# Patient Record
Sex: FEMALE | Race: WHITE | NOT HISPANIC OR LATINO | Employment: FULL TIME | URBAN - METROPOLITAN AREA
[De-identification: names, ages, dates, MRNs, and addresses within clinical notes are randomized per-mention and may not be internally consistent; named-entity substitution may affect disease eponyms.]

---

## 2018-09-28 ENCOUNTER — TRANSFERRED RECORDS (OUTPATIENT)
Dept: HEALTH INFORMATION MANAGEMENT | Facility: CLINIC | Age: 23
End: 2018-09-28

## 2019-03-12 ENCOUNTER — PATIENT OUTREACH (OUTPATIENT)
Dept: PLASTIC SURGERY | Facility: CLINIC | Age: 24
End: 2019-03-12

## 2019-03-12 DIAGNOSIS — F64.0 GENDER DYSPHORIA IN ADULT: Primary | ICD-10-CM

## 2019-03-12 NOTE — PROGRESS NOTES
[facility]:  Care Coordination Note     SITUATION   Patient (Kun, She/Her)  is a 24 year old who is receiving support for:  Clinic Care Coordination - Initial (New Pt seeking PCP/Hormones & Vaginoplasty )  .    BACKGROUND     New patient seeking services at [facility]: PCP, Hormones, vaginoplasty.     ASSESSMENT     Surgery              CGC Assessment  Comprehensive Gender Care (CGC) Enrollment: Enrolled(Hormones for 1.5 years)  Patient has a therapist: Yes  Name of therapist: Jerri Castaneda from [place]  Letter of support #1: Requested  Letter of support #2: Requested  Surgery being considered: Yes  Vaginoplasty: Yes          PLAN       Nursing Interventions:   [facility] program and services discussed with patient. CGC referral placed. CGC assessment completed. Process for accessing surgery discussed including: WPATH standards of care, Letters of support, [place] insurance process, surgery scheduling, and approximate timeline. Pt questions answered. Registration completed & reviewed; scheduling process discussed & completed, as necessary. Referrals provided: [facility], phone # provided.    Pt has duplicate record with former legal name (Raj Florian). Since no documentation exists, new record created with current legal name.     More than 50% of the time was used to educate patient on medical & surgical process.     Follow-up plan:  Pt to obtain two letters of support from mental health providers and fax to Dr. Oniel Butler's office. Fax # was provided. Pt to sign up for ev[employer]Snover. Once two letters have been received, pt will be called and scheduled for lower surgery consult.        Larry Schulz

## 2019-03-13 ENCOUNTER — OFFICE VISIT (OUTPATIENT)
Dept: FAMILY MEDICINE | Facility: CLINIC | Age: 24
End: 2019-03-13
Payer: COMMERCIAL

## 2019-03-13 VITALS
OXYGEN SATURATION: 97 % | HEART RATE: 76 BPM | HEIGHT: 67 IN | DIASTOLIC BLOOD PRESSURE: 75 MMHG | BODY MASS INDEX: 27.59 KG/M2 | TEMPERATURE: 98.4 F | RESPIRATION RATE: 20 BRPM | WEIGHT: 175.8 LBS | SYSTOLIC BLOOD PRESSURE: 111 MMHG

## 2019-03-13 DIAGNOSIS — F64.0 GENDER DYSPHORIA IN ADULT: Primary | ICD-10-CM

## 2019-03-13 PROBLEM — G40.822: Status: ACTIVE | Noted: 2019-03-13

## 2019-03-13 LAB
ALBUMIN SERPL-MCNC: 5 MG/DL (ref 3.8–5)
ALP SERPL-CCNC: 27.9 U/L (ref 31.7–110.5)
ALT SERPL-CCNC: 11.8 U/L (ref 0–45)
AST SERPL-CCNC: 11 U/L (ref 0–45)
BILIRUB SERPL-MCNC: 0.4 MG/DL (ref 0.2–1.3)
BUN SERPL-MCNC: 12.8 MG/DL (ref 7–19)
CALCIUM SERPL-MCNC: 9.3 MG/DL (ref 8.5–10.1)
CHLORIDE SERPLBLD-SCNC: 100 MMOL/L (ref 98–110)
CHOLEST SERPL-MCNC: 139.2 MG/DL (ref 0–200)
CHOLEST/HDLC SERPL: 3.5 {RATIO} (ref 0–5)
CO2 SERPL-SCNC: 27.3 MMOL/L (ref 20–32)
CREAT SERPL-MCNC: 0.8 MG/DL (ref 0.5–1)
GFR SERPL CREATININE-BSD FRML MDRD: >90 ML/MIN/1.7 M2
GLUCOSE SERPL-MCNC: 105.5 MG'DL (ref 70–99)
HDLC SERPL-MCNC: 39.3 MG/DL
HEMOGLOBIN: 12.9 G/DL (ref 11.7–15.7)
LDLC SERPL CALC-MCNC: 87 MG/DL (ref 0–129)
POTASSIUM SERPL-SCNC: 3.7 MMOL/DL (ref 3.3–4.5)
PROT SERPL-MCNC: 7.7 G/DL (ref 6.8–8.8)
SODIUM SERPL-SCNC: 135.5 MMOL/L (ref 132.6–141.4)
TRIGL SERPL-MCNC: 65.6 MG/DL (ref 0–150)
VLDL CHOLESTEROL: 13.1 MG/DL (ref 7–32)

## 2019-03-13 RX ORDER — SPIRONOLACTONE 100 MG/1
100 TABLET, FILM COATED ORAL 2 TIMES DAILY
COMMUNITY
End: 2019-06-03

## 2019-03-13 RX ORDER — LACOSAMIDE 100 MG/1
100 TABLET ORAL 2 TIMES DAILY
COMMUNITY
End: 2019-05-31

## 2019-03-13 RX ORDER — ESTRADIOL 2 MG/1
2 TABLET ORAL DAILY
COMMUNITY
End: 2019-05-31

## 2019-03-13 ASSESSMENT — MIFFLIN-ST. JEOR: SCORE: 1584.01

## 2019-03-13 NOTE — PROGRESS NOTES
Transgender History Intake:       IZAIAH Tristan is a 24 year old individual  who presents today for interest in feminizing hormone therapy to better align their body with their gender identity.  Came to this clinic via referral from: ALISTAIR Schulz, [facility] care coordinator    1-How do you identify? BOLD all that apply     Male   Female Transgender  FTM  MTF  Intersex    Genderqueer Gender non-conforming Bigender Don't know Other:     2. What pronouns do you prefer?  She/Her/Hers/Herself   3-What age did you first feel your gender identity (internal sense of gender) did not match your physical body?      3 years ago. Would dress in mom's clothes as a teen   4-Have you ever felt depressed or suicidal because your gender identity and body don't match?    Has had depressed feelings in the past, but saw a therapist which helps. Has never been on medication for depression or anxiety.   5-Have you legally changed your name? yes   If yes: prior name for LIDIA:   Gender marker on ID's?   Yes, has changed on license but not on passport or birth certificate  6-Have you ever seen a health care provider about being [facility]? [facility] [place] [place]  When were you first treated and where? [place], [place]  7-What hormones have you been on and for how long? (These can be treatments you were prescribed, that you got from a friend or bought without a prescription. Include any treatments you currently take.) has been prescribed estradiol and spironolactone from provider in [place], [place]. Comes with records today. Started HRT 1.5 years ago.   8-Ever had any problems with hormone treatment?  No  9-If not on hormones, would you like to be?   N/a, on hormones  10-Have you had any gender affirmation surgery? No  11-Do you want to have surgery now or in future?  YES wants to have bottom surgery  12-Are you out at work or at school or at home?      Everyone knows.  13-What are your other questions or concerns today?  none  14-What else we should know about you?  Has epilepsy for which she takes lacosamide.  Seizures are well controlled with no seizure years. Works at [employer]. Came from [facility] 3 weeks ago. Was living with her mother there.   Has done laser hair removal.     ----------      History reviewed. No pertinent surgical history.    Patient Active Problem List   Diagnosis     Epilepsy     Gender dysphoria in adult     Past Medical History:   Diagnosis Date     Epilepsy (H)      Gender dysphoria        Current Outpatient Medications   Medication Sig Dispense Refill     estradiol (ESTRACE) 2 MG tablet Take 2 mg by mouth daily       Lacosamide (VIMPAT) 100 MG TABS tablet Take 100 mg by mouth 2 times daily       spironolactone (ALDACTONE) 100 MG tablet Take 100 mg by mouth 2 times daily         Family History   Problem Relation Age of Onset     Breast Cancer Mother      Bleeding Disorder Father    Diabetes runs in family but doesn't know who.    Allergies   Allergen Reactions     Codeine Hives     Nickel Rash       History   Smoking Status     Never Smoker   Smokeless Tobacco     Never Used   Social drinking.   Occasional marijuana.     Mental Health Assessment:  Non gender related Therapist? Yes  Chemical use history no  Mental Health diagnosis  history No diagnosis of MDD or anxiety.   Medications prescribed for above and by whom? none  LIDIA sent to:  Patient does not know who her previous providers are other than [facility] [place].            Review of Systems:   CONSTITUTIONAL: NEGATIVE for fever, chills, change in weight  INTEGUMENTARY/SKIN: NEGATIVE for worrisome rashes, moles or lesions  EYES: NEGATIVE for vision changes or irritation  ENT/MOUTH: NEGATIVE for ear, mouth and throat problems  RESP: NEGATIVE for significant cough or SOB  BREAST: NEGATIVE for masses, tenderness or discharge  CV: NEGATIVE for chest pain, palpitations or peripheral edema  GI: NEGATIVE for nausea, abdominal pain, heartburn, or  change in bowel habits  : NEGATIVE for frequency, dysuria, or hematuria  MUSCULOSKELETAL: NEGATIVE for significant arthralgias or myalgia  NEURO: NEGATIVE for weakness, dizziness or paresthesias  ENDOCRINE: NEGATIVE for temperature intolerance, skin/hair changes  HEME/ALLERGY: NEGATIVE for bleeding problems  PSYCHIATRIC: NEGATIVE for changes in mood or affect           Social History     Social History     Socioeconomic History     Marital status: Single     Spouse name: Not on file     Number of children: Not on file     Years of education: Not on file     Highest education level: Not on file   Occupational History     Not on file   Social Needs     Financial resource strain: Not on file     Food insecurity:     Worry: Not on file     Inability: Not on file     Transportation needs:     Medical: Not on file     Non-medical: Not on file   Tobacco Use     Smoking status: Not on file   Substance and Sexual Activity     Alcohol use: Not on file     Drug use: Not on file     Sexual activity: Not on file   Lifestyle     Physical activity:     Days per week: Not on file     Minutes per session: Not on file     Stress: Not on file   Relationships     Social connections:     Talks on phone: Not on file     Gets together: Not on file     Attends Yarsani service: Not on file     Active member of club or organization: Not on file     Attends meetings of clubs or organizations: Not on file     Relationship status: Not on file     Intimate partner violence:     Fear of current or ex partner: Not on file     Emotionally abused: Not on file     Physically abused: Not on file     Forced sexual activity: Not on file   Other Topics Concern     Not on file   Social History Narrative     Not on file       Marital Status: Single  Who lives in your household? Friends (roomates)    Has anyone hurt you physically, for example by pushing, hitting, slapping or kicking you or forcing you to have sex? Denies  Do you feel threatened or  "controlled by a partner, ex-partner or anyone in your life? Denies    Sexual Health     Sexual concerns:  No   History of sexual abuse:  No  STI History:   Neg    Sexual health and relationships:  Current sexual partners: Other pansexual     Past sexual partners:    Other pansexual         Physical Exam:     Vitals:    03/13/19 0822   BP: 111/75   Pulse: 76   Resp: 20   Temp: 98.4  F (36.9  C)   SpO2: 97%   Weight: 79.7 kg (175 lb 12.8 oz)   Height: 1.708 m (5' 7.25\")     BMI= Body mass index is 27.33 kg/m .   Appearance: female appearance and dress  GENERAL:: healthy, alert and no distress   EYES: Eyes grossly normal to inspection  HENT: NC/AT  RESP: lungs clear to auscultation - no rales, no rhonchi, no wheezes  CV: regular rates and rhythm, normal S1 S2, no S3 or S4 and no murmur, no click or rub -  ABDOMEN: soft, no tenderness  NEURO:  mentation intact and speech normal  Psych: Alert and oriented times 3; coherent speech, normal rate and volume, able to articulate logical thoughts, able to abstract reason, no tangential thoughts, no hallucinations or delusions.   Affect: Appropriate/mood-congruent    Assessment and Plan     1. Gender dysphoria in adult  Patient presents to establish care at [facility] for ongoing HRT. Has been on HRT, estradiol 2mg daily and spironolactone 100mg BID, for 1.5 years. Doing well with no concerns. Is happy to date with changes seen. Next step for her is gender affirming surgery. Is working on getting 2 letters from mental health providers. Is working with ALISTAIR Schulz. Will be seeing Dr. Butler for consultation after receiving 2 support letters.   Not needing refill of meds today.   Will obtain HRT labs today.   - Testosterone Total  - Comprehensive Metabolic Panel  - Hemoglobin (HGB)  - Lipid Cascade  .   Follow up:  Follow up in 3 months.    Luz Rodriguez MD  Family Medicine PGY3 Resident                    "

## 2019-03-13 NOTE — PROGRESS NOTES
Preceptor Attestation:   Patient seen, evaluated and discussed with the resident. I have verified the content of the note, which accurately reflects my assessment of the patient and the plan of care.   Supervising Physician:  Stephanie Kramer MD

## 2019-03-14 ENCOUNTER — TELEPHONE (OUTPATIENT)
Dept: FAMILY MEDICINE | Facility: CLINIC | Age: 24
End: 2019-03-14

## 2019-03-14 DIAGNOSIS — F64.0 GENDER DYSPHORIA IN ADULT: Primary | ICD-10-CM

## 2019-03-14 NOTE — TELEPHONE ENCOUNTER
[facility] phone call message - order or referral request for patient:     Order or referral being requested: Referral      Additional Comments: Patient is requesting a mental health referral to see a therapist before having top surgery.     OK to leave a message on voice mail? Yes    Primary language: English      needed? No    Call taken on March 14, 2019 at 12:23 PM by Svetlana Hicks

## 2019-03-15 ENCOUNTER — MEDICAL CORRESPONDENCE (OUTPATIENT)
Dept: HEALTH INFORMATION MANAGEMENT | Facility: CLINIC | Age: 24
End: 2019-03-15

## 2019-03-15 NOTE — TELEPHONE ENCOUNTER
Placed outreach call to patient to discuss mental health referral placed at yesterday's visit. She had some questions about this. She is interested in finding trans-affirmative therapist near home in [place]. I sent her a list via [employer] (see associated encounter). She also asked when she needed to next follow-up with Dr. Rodriguez. Told her that according to plan in note, follow-up was recommended in 3 months but can call back if she needs to be seen sooner. She states understanding.      Mare Swanson, PhD  Behavioral Health Fellow

## 2019-03-15 NOTE — TELEPHONE ENCOUNTER
RN informed patient of referral being placed. They were uncertain of what their options are. RN connected with Dr. Mare Swanson in  and she is willing to connect with patient and provide more information to them. Routing to EARL Swanson.  Brittaney Easley RN

## 2019-03-18 LAB — TESTOST SERPL-MCNC: 10 NG/DL (ref 8–60)

## 2019-04-16 ENCOUNTER — MEDICAL CORRESPONDENCE (OUTPATIENT)
Dept: HEALTH INFORMATION MANAGEMENT | Facility: CLINIC | Age: 24
End: 2019-04-16

## 2019-05-13 ENCOUNTER — TELEPHONE (OUTPATIENT)
Dept: PLASTIC SURGERY | Facility: CLINIC | Age: 24
End: 2019-05-13

## 2019-05-13 NOTE — TELEPHONE ENCOUNTER
[facility]    Phone Message    May a detailed message be left on voicemail: yes    Reason for Call: Other: Patient will be faxing over 2 letters as discussed with Larry.  Will be expecting follow up call.     Action Taken: Message routed to:  [facility]: nikhil solitario

## 2019-05-14 NOTE — TELEPHONE ENCOUNTER
Called pt, provided fax # to have her fax letters of support. Once received, writer will call pt back to schedule consultation with Dr. Butler.

## 2019-05-18 ENCOUNTER — MEDICAL CORRESPONDENCE (OUTPATIENT)
Dept: HEALTH INFORMATION MANAGEMENT | Facility: CLINIC | Age: 24
End: 2019-05-18

## 2019-05-31 ENCOUNTER — OFFICE VISIT (OUTPATIENT)
Dept: FAMILY MEDICINE | Facility: CLINIC | Age: 24
End: 2019-05-31
Payer: COMMERCIAL

## 2019-05-31 VITALS
TEMPERATURE: 96.6 F | HEIGHT: 68 IN | HEART RATE: 61 BPM | DIASTOLIC BLOOD PRESSURE: 69 MMHG | OXYGEN SATURATION: 99 % | WEIGHT: 160 LBS | BODY MASS INDEX: 24.25 KG/M2 | SYSTOLIC BLOOD PRESSURE: 109 MMHG | RESPIRATION RATE: 16 BRPM

## 2019-05-31 DIAGNOSIS — G40.822 NONINTRACTABLE EPILEPTIC SPASMS WITHOUT STATUS EPILEPTICUS (H): ICD-10-CM

## 2019-05-31 DIAGNOSIS — F64.0 GENDER DYSPHORIA IN ADULT: Primary | Chronic | ICD-10-CM

## 2019-05-31 RX ORDER — ESTRADIOL 2 MG/1
4 TABLET ORAL DAILY
Qty: 180 TABLET | Refills: 0 | Status: SHIPPED | OUTPATIENT
Start: 2019-05-31 | End: 2019-08-16

## 2019-05-31 RX ORDER — LACOSAMIDE 100 MG/1
100 TABLET ORAL 2 TIMES DAILY
Qty: 60 TABLET | Refills: 0 | Status: SHIPPED | OUTPATIENT
Start: 2019-05-31 | End: 2019-07-02

## 2019-05-31 ASSESSMENT — MIFFLIN-ST. JEOR: SCORE: 1519.77

## 2019-05-31 ASSESSMENT — PAIN SCALES - GENERAL: PAINLEVEL: NO PAIN (0)

## 2019-05-31 NOTE — PROGRESS NOTES
IZAIAH      Kun is a 24 year old male to female transgender individual that uses pronouns She/Her/Hers/Herself that presents today for follow up of:  feminizing hormone therapy. Gender identity: female    Any special concerns today?      - Kun would like to increase the dose of estrogen, and she has been on this dose (2 mg) for a year and a half, and though she is happy with the results, she would like to have more fat root distribution to the hip area.    -Grand mal seizure.  She finds her antiepileptic medication is expensive, would like us to help her change to a less expensive option and prescribe it for her regularly.  She has [place] [facility] they repeated an EEG recently, and she does not want to go through these expenses again to establish care with someone at the [facility].  She has been stable on her current regimen without seizures for over 2 years.  -She also has an appointment with plastic surgery at the [facility] surgery.  She is very concerned about the expenses and would like to know if there is some sort of payment plan they can use.    On hormones?  YES +++ Shot day of the week? Not applicable-taking pills/patch/gel      Due for labs?  No      +++ Refills of meds needed?  Yes  ---    No past surgical history on file.    Patient Active Problem List   Diagnosis     Epilepsy     Gender dysphoria in adult     Current Outpatient Medications   Medication Sig Dispense Refill     estradiol (ESTRACE) 2 MG tablet Take 2 mg by mouth daily       Lacosamide (VIMPAT) 100 MG TABS tablet Take 100 mg by mouth 2 times daily       spironolactone (ALDACTONE) 100 MG tablet Take 100 mg by mouth 2 times daily         History   Smoking Status     Never Smoker   Smokeless Tobacco     Never Used          Allergies   Allergen Reactions     Codeine Hives     Nickel Rash       Problem, Medication and Allergy Lists were reviewed and are current..         Review of Systems:      General    Fat  "redistribution: YES    Weight change: no HEENT    Voice change: not applicable     Cardiovascular (CV)    Chest Pains: no    Shortness of breath: no Chest    Decreased exercise tolerance:  no    Breast changes/development: YES     Gastrointestinal (GI)    Abdominal pain: no    Change in appetite: no Skin    Acne or oily skin: no    Change in hair: YES- decreased     Genitourinary ()    Abnormal vaginal bleeding: not applicable     Decreased spontaneous erections: YES    Change in libido: YES    New sexual partners: no Musculoskeletal    Leg pain or swelling: no     Psychiatric (Psych)    Depression: no    Anxiety/Panic: no    Mood:  \"good\"                    Physical Exam:   There were no vitals filed for this visit.  BMI= There is no height or weight on file to calculate BMI.   Wt Readings from Last 10 Encounters:   03/13/19 79.7 kg (175 lb 12.8 oz)     Appearance: Female appearance and dress    GENERAL:: healthy, alert and no distress  RESP: clear breath sounds bilaterally   CV: RRR, no murmurs noted  ABDOMEN: Nondistended, soft, nontender, no hepatomegaly    Affect: Appropriate/mood-congruent and Reactive/Full range           Labs:   Results from last visit:  Office Visit on 03/13/2019   Component Date Value Ref Range Status     Testosterone Total 03/13/2019 10  8 - 60 ng/dL Final    Comment: This test was developed and its performance characteristics determined by the   [facility],  Special Chemistry Laboratory. It has   not been cleared or approved by the FDA. The laboratory is regulated under   CLIA as qualified to perform high-complexity testing. This test is used for   clinical purposes. It should not be regarded as investigational or for   research.       Albumin 03/13/2019 5.0  3.8 - 5.0 mg/dL Final     Alkaline Phosphatase 03/13/2019 27.9* 31.7 - 110.5 U/L Final     ALT 03/13/2019 11.8  0.0 - 45.0 U/L Final     AST 03/13/2019 11.0  0.0 - 45.0 U/L Final     Bilirubin Total " 03/13/2019 0.4  0.2 - 1.3 mg/dL Final     Urea Nitrogen 03/13/2019 12.8  7.0 - 19.0 mg/dL Final     Calcium 03/13/2019 9.3  8.5 - 10.1 mg/dL Final     Chloride 03/13/2019 100.0  98.0 - 110.0 mmol/L Final     Carbon Dioxide 03/13/2019 27.3  20.0 - 32.0 mmol/L Final     Creatinine 03/13/2019 0.8  0.5 - 1.0 mg/dL Final     Glucose 03/13/2019 105.5* 70.0 - 99.0 mg'dL Final     Potassium 03/13/2019 3.7  3.3 - 4.5 mmol/dL Final     Sodium 03/13/2019 135.5  132.6 - 141.4 mmol/L Final     Protein Total 03/13/2019 7.7  6.8 - 8.8 g/dL Final     GFR Estimate 03/13/2019 >90  >60.0 mL/min/1.7 m2 Final     GFR Estimate If Black 03/13/2019 >90  >60.0 mL/min/1.7 m2 Final     Hemoglobin 03/13/2019 12.9  11.7 - 15.7 g/dL Final     Cholesterol 03/13/2019 139.2  0.0 - 200.0 mg/dL Final     Cholesterol/HDL Ratio 03/13/2019 3.5  0.0 - 5.0 Final     HDL Cholesterol 03/13/2019 39.3* >40.0 mg/dL Final     LDL Cholesterol Calculated 03/13/2019 87  0 - 129 mg/dL Final     Triglycerides 03/13/2019 65.6  0.0 - 150.0 mg/dL Final     VLDL Cholesterol 03/13/2019 13.1  7.0 - 32.0 mg/dL Final       Assessment and Plan       ICD-10-CM    1. Gender dysphoria in adult - trangender female F64.0 estradiol (ESTRACE) 2 MG tablet   2. Epilepsy G40.822 Lacosamide (VIMPAT) 100 MG TABS tablet      Regarding her gender dysphoria, we decided to increase the estrogen dose, even though her testosterone is suppressed to an adequate level.  She will start on 4 mg daily and will follow-up in 3 months.  Explained that increasing the dose may not necessarily give her the results she is looking for, but ultimately the solution for the distribution of fatty tissues is surgery.  She understands, but since she has been on rather low dose, she would like to give it a shot.  She has an appointment with surgeon for bottom surgery in August.    Regarding her seizure disorder, I explained that we did not feel comfortable changing medications as she has been stable on for so  many years.  That should be done by a neurologist.  I encouraged her to go back to her previous specialist, as that would most likely be less expensive than establishing care with a new one.  Her main problem with them was that she could not get a hold of them easily to repeat medication.  I offered to repeat medications for her as long as she is stable, when she has achieved good control with the new medication.  She understands this and will discuss this further with her neurologist.      Contraception:   none    Counselled patient about controlled substances: N/A     Follow up:  Follow up in 3 months.  Results by sreedhart  Questions were elicited and answered.     Josie Macias MD

## 2019-05-31 NOTE — PROGRESS NOTES
Preceptor Attestation:   Patient seen, evaluated and discussed with the resident. I have verified the content of the note, which accurately reflects my assessment of the patient and the plan of care.   Supervising Physician:  Namita Garcia MD

## 2019-05-31 NOTE — PATIENT INSTRUCTIONS
Here is the plan from today's visit    1. Gender dysphoria in adult - trangender female  Increase to 2 tabs per day, come see me in 3 months  - estradiol (ESTRACE) 2 MG tablet; Take 2 tablets (4 mg) by mouth daily  Dispense: 180 tablet; Refill: 0    2. Epilepsy  Talk to your neurologist about changing to a cheaper antiepileptic  - Lacosamide (VIMPAT) 100 MG TABS tablet; Take 1 tablet (100 mg) by mouth 2 times daily  Dispense: 60 tablet; Refill: 0      Follow up plan  Come back in 3 months     Thank you for coming to [facility] today.  Lab Testing:  **If you had lab testing today and your results are reassuring or normal they will be mailed to you or sent through [employer] within 7 days.   **If the lab tests need quick action we will call you with the results.  The phone number we will call with results is # [phone] (home) . If this is not the best number please call our clinic and change the number.  Medication Refills:  If you need any refills please call your pharmacy and they will contact us.   If you need to  your refill at a new pharmacy, please contact the new pharmacy directly. The new pharmacy will help you get your medications transferred faster.   Scheduling:  If you have any concerns about today's visit or wish to schedule another appointment please call our office during normal business hours [phone] (8-5:00 M-F)  If a referral was made to a [facility] and you don't get a call from central scheduling please call [phone].  If a Mammogram was ordered for you at [facility] call [phone] to schedule or change your appointment.  If you had an XRay/CT/Ultrasound/MRI ordered the number is [phone] to schedule or change your radiology appointment.   Medical Concerns:  If you have urgent medical concerns please call [phone] at any time of the day.    Josie Macias MD

## 2019-05-31 NOTE — ASSESSMENT & PLAN NOTE
Doing well on estrogen and spironolactone since ~Sept-2017  Labs Mar-2019 w/mild AlkPhos elevation, .

## 2019-07-17 ENCOUNTER — TELEPHONE (OUTPATIENT)
Dept: FAMILY MEDICINE | Facility: CLINIC | Age: 24
End: 2019-07-17

## 2019-07-17 NOTE — TELEPHONE ENCOUNTER
LVM with pt. Explained my role at clinic and that I was asked to call in connected with the expensive medication refill process. Asked pt to call back on clinic line and ask for social work

## 2019-07-19 ENCOUNTER — PRE VISIT (OUTPATIENT)
Dept: PLASTIC SURGERY | Facility: CLINIC | Age: 24
End: 2019-07-19

## 2019-07-19 NOTE — TELEPHONE ENCOUNTER
FUTURE VISIT INFORMATION      FUTURE VISIT INFORMATION:    Date: 8/13/19    Time: 12:00pm    Location: [facility]  REFERRAL INFORMATION:    Referring provider:  Self    Referring providers clinic:  N/A    Reason for visit/diagnosis  vaginoplasty.    RECORDS REQUESTED FROM:       No records to collect

## 2019-08-13 ENCOUNTER — MYC REFILL (OUTPATIENT)
Dept: FAMILY MEDICINE | Facility: CLINIC | Age: 24
End: 2019-08-13

## 2019-08-13 ENCOUNTER — OFFICE VISIT (OUTPATIENT)
Dept: PLASTIC SURGERY | Facility: CLINIC | Age: 24
End: 2019-08-13
Attending: PLASTIC SURGERY
Payer: COMMERCIAL

## 2019-08-13 ENCOUNTER — PATIENT OUTREACH (OUTPATIENT)
Dept: PLASTIC SURGERY | Facility: CLINIC | Age: 24
End: 2019-08-13

## 2019-08-13 VITALS
BODY MASS INDEX: 26.57 KG/M2 | HEIGHT: 67 IN | WEIGHT: 169.3 LBS | DIASTOLIC BLOOD PRESSURE: 70 MMHG | HEART RATE: 76 BPM | OXYGEN SATURATION: 100 % | SYSTOLIC BLOOD PRESSURE: 123 MMHG

## 2019-08-13 DIAGNOSIS — F64.0 GENDER DYSPHORIA IN ADULT: Chronic | ICD-10-CM

## 2019-08-13 DIAGNOSIS — F64.0 GENDER DYSPHORIA IN ADULT: Primary | ICD-10-CM

## 2019-08-13 RX ORDER — SPIRONOLACTONE 100 MG/1
100 TABLET, FILM COATED ORAL 2 TIMES DAILY
Qty: 120 TABLET | Refills: 3 | Status: SHIPPED | OUTPATIENT
Start: 2019-08-13 | End: 2019-10-05

## 2019-08-13 ASSESSMENT — PAIN SCALES - GENERAL: PAINLEVEL: NO PAIN (0)

## 2019-08-13 ASSESSMENT — MIFFLIN-ST. JEOR: SCORE: 1550.57

## 2019-08-13 NOTE — LETTER
8/13/2019       RE: Kun Florian  [address] [facility] [address]     Dear Colleague,    Thank you for referring your patient, Kun Florian, to the [facility] at [facility]. Please see a copy of my visit note below.    CC: Gender dysphoria, requesting vaginoplasty.    HPI: Patient is a 24-year-old trans-woman who prefers she her pronouns, interested in undergoing vaginoplasty.  Patient reports she has been considering undergoing vaginoplasty for the past 2 years.  In fact, she tucks her penis when in public and has been doing so for the last 2 years.  She sits to urinate routinely and has been doing so for the past 2 years.  By undergoing vaginoplasty, she would like to better align her physical body with her chosen gender identity.  She transitioned 2 years ago.  Chosen name is Kun.  She has been on hormone therapy for the past 2 years.  She has not undergone any transition related surgeries.  She denies any previous urinary issues, prostate issues, perianal issues, colitis, and sexually transmitted infections.  She is currently able to achieve orgasm.  She does masturbate.  She reports that the head of the penis is the most sensitive.    MEDS:   Prior to Admission medications    Medication Sig Start Date End Date Taking? Authorizing Provider   estradiol (ESTRACE) 2 MG tablet Take 2 tablets (4 mg) by mouth daily 5/31/19  Yes Namita Garcia MD   Lacosamide (VIMPAT) 100 MG TABS tablet Take 1 tablet (100 mg) by mouth 2 times daily 7/5/19  Yes Josie Macias MD   spironolactone (ALDACTONE) 100 MG tablet Take 1 tablet (100 mg) by mouth 2 times daily 6/3/19  Yes Jerrica Rendon DO       ALLERGIES:   Allergies   Allergen Reactions     Codeine Hives     Nickel Rash       PMH: Epilepsy (currently seizures are rare).    PSH: Tonsillectomy and wisdom teeth extraction.    SH:   Social History     Tobacco Use     Smoking status:  "Never Smoker     Smokeless tobacco: Never Used   Substance Use Topics     Alcohol use: Yes     Comment: occasional, socially   Recently moved here from [place]. Works as a  at [employer].    FH:   Family History   Problem Relation Age of Onset     Breast Cancer Mother      Bleeding Disorder Father        ROS: Denies chest pain, shortness of breath, diabetes, MI, CVA, DVT, PE, and bleeding disorders.    PHYSICAL EXAMINATION: /70 (BP Location: Left arm, Patient Position: Sitting, Cuff Size: Adult Regular)   Pulse 76   Ht 1.702 m (5' 7\")   Wt 76.8 kg (169 lb 4.8 oz)   SpO2 100%   BMI 26.52 kg/m     General: No acute distress.  Appears feminine.  Genital examination was performed in the presence of a chaperone.  This revealed a circumcised penis with a shaft length that measures 3.5 cm at rest.  There are 2 testicles palpable within the scrotum.  There is no palpable inguinal hernia.  Genitalia is hirsute.  Examination of the abdomen revealed an intra-abdominal rash involving approximately 15 x 15 cm of area with dry eschar formation, dermatitis, and slight erythema.    ASSESSMENT: Gender dysphoria, requesting vaginoplasty.    PLAN: Patient is provided us with 2 letters of support.  I recommended patient find a local therapist that she is able to establish a routine relationship, especially around the time of surgery.  We are ordering a dermatology referral for evaluation for intra-abdominal rash treatment and pre-vaginoplasty genital hair removal.  Hair removal is performed to decrease the risk of postoperative intravaginal hair growth, which is impossible and unsafe to remove after vaginoplasty.  We will also order a urology referral to discuss orchiectomy and fertility.  Patient would like to have her orchiectomy performed at the same time as the vaginoplasty.  With these items completed, in accordance with WPATH Standards Of Care guidelines, patient is a potential candidate for penile inversion " vaginoplasty with supplemental scrotal skin graft as an inpatient at the [facility] with a postoperative admission to maintain a vaginal stent for 5 to 7 days.  The operation is performed in conjunction with Dr. Toribio Morales given the need for surgical expertise from 2 separate subspecialties.  I explained the vaginoplasty procedure in detail today, which include neovaginal cavity dissection, grafting of neovaginal cavity, vaginal colpopexy, clitoroplasty, urethroplasty, perineal flap, orchiectomy, penectomy, scrotectomy, and bilateral labiaplasty.  Patient and I discussed the risks involved to include bleeding, infection, injury to surrounding structures, fluid collection, wound dehiscence with prolonged dressing changes, asymmetry, contour deformity, DVT, PE, rectal injury, rectovaginal fistula, possible need for ostomy, clitoral necrosis, sensory loss, voiding issues, urinary stream abnormalities, flap loss, vaginal prolapse, vaginal shrinkage, vaginal stenosis, need for lifelong dilation, granulation tissue, chronic pain, intravaginal hair growth, incompletely feminized external vulva, and need for revision surgery.  Patient accepts these risks and wishes to work towards obtaining surgery.  I also explained to her that with this surgical technique, we are relying on anterior blood supply to heal the surgical site, preventing me from performing anterior labiaplasty.  And therefore she may require a second stage feminizing labiaplasty to fully feminize the external vulva.  She understood this.  Patient will be instructed to discontinue hormone therapy 4 weeks prior to surgery and resume it 2 weeks postoperatively to control its risk of DVT.  Patient will perform a bowel prep 1 to 2 days prior to surgery.    Total time spent with patient was 60 min of which greater than 50% was in counseling.    Again, thank you for allowing me to participate in the care of your patient.       Sincerely,    Oniel Butler MD

## 2019-08-13 NOTE — PROGRESS NOTES
CC: Gender dysphoria, requesting vaginoplasty.    HPI: Patient is a 24-year-old trans-woman who prefers she her pronouns, interested in undergoing vaginoplasty.  Patient reports she has been considering undergoing vaginoplasty for the past 2 years.  In fact, she tucks her penis when in public and has been doing so for the last 2 years.  She sits to urinate routinely and has been doing so for the past 2 years.  By undergoing vaginoplasty, she would like to better align her physical body with her chosen gender identity.  She transitioned 2 years ago.  Chosen name is Kun.  She has been on hormone therapy for the past 2 years.  She has not undergone any transition related surgeries.  She denies any previous urinary issues, prostate issues, perianal issues, colitis, and sexually transmitted infections.  She is currently able to achieve orgasm.  She does masturbate.  She reports that the head of the penis is the most sensitive.    MEDS:   Prior to Admission medications    Medication Sig Start Date End Date Taking? Authorizing Provider   estradiol (ESTRACE) 2 MG tablet Take 2 tablets (4 mg) by mouth daily 5/31/19  Yes Namita Garcia MD   Lacosamide (VIMPAT) 100 MG TABS tablet Take 1 tablet (100 mg) by mouth 2 times daily 7/5/19  Yes Josie Macias MD   spironolactone (ALDACTONE) 100 MG tablet Take 1 tablet (100 mg) by mouth 2 times daily 6/3/19  Yes Jerrica Rendon DO       ALLERGIES:   Allergies   Allergen Reactions     Codeine Hives     Nickel Rash       PMH: Epilepsy (currently seizures are rare).    PSH: Tonsillectomy and wisdom teeth extraction.    SH:   Social History     Tobacco Use     Smoking status: Never Smoker     Smokeless tobacco: Never Used   Substance Use Topics     Alcohol use: Yes     Comment: occasional, socially   Recently moved here from [place]. Works as a  at [employer].    FH:   Family History   Problem Relation Age of Onset     Breast Cancer Mother      Bleeding  "Disorder Father        ROS: Denies chest pain, shortness of breath, diabetes, MI, CVA, DVT, PE, and bleeding disorders.    PHYSICAL EXAMINATION: /70 (BP Location: Left arm, Patient Position: Sitting, Cuff Size: Adult Regular)   Pulse 76   Ht 1.702 m (5' 7\")   Wt 76.8 kg (169 lb 4.8 oz)   SpO2 100%   BMI 26.52 kg/m    General: No acute distress.  Appears feminine.  Genital examination was performed in the presence of a chaperone.  This revealed a circumcised penis with a shaft length that measures 3.5 cm at rest.  There are 2 testicles palpable within the scrotum.  There is no palpable inguinal hernia.  Genitalia is hirsute.  Examination of the abdomen revealed an intra-abdominal rash involving approximately 15 x 15 cm of area with dry eschar formation, dermatitis, and slight erythema.    ASSESSMENT: Gender dysphoria, requesting vaginoplasty.    PLAN: Patient is provided us with 2 letters of support.  I recommended patient find a local therapist that she is able to establish a routine relationship, especially around the time of surgery.  We are ordering a dermatology referral for evaluation for intra-abdominal rash treatment and pre-vaginoplasty genital hair removal.  Hair removal is performed to decrease the risk of postoperative intravaginal hair growth, which is impossible and unsafe to remove after vaginoplasty.  We will also order a urology referral to discuss orchiectomy and fertility.  Patient would like to have her orchiectomy performed at the same time as the vaginoplasty.  With these items completed, in accordance with ATH Standards Of Care guidelines, patient is a potential candidate for penile inversion vaginoplasty with supplemental scrotal skin graft as an inpatient at the [facility] with a postoperative admission to maintain a vaginal stent for 5 to 7 days.  The operation is performed in conjunction with Dr. Toribio Morales given the need for surgical expertise from 2 " separate subspecialties.  I explained the vaginoplasty procedure in detail today, which include neovaginal cavity dissection, grafting of neovaginal cavity, vaginal colpopexy, clitoroplasty, urethroplasty, perineal flap, orchiectomy, penectomy, scrotectomy, and bilateral labiaplasty.  Patient and I discussed the risks involved to include bleeding, infection, injury to surrounding structures, fluid collection, wound dehiscence with prolonged dressing changes, asymmetry, contour deformity, DVT, PE, rectal injury, rectovaginal fistula, possible need for ostomy, clitoral necrosis, sensory loss, voiding issues, urinary stream abnormalities, flap loss, vaginal prolapse, vaginal shrinkage, vaginal stenosis, need for lifelong dilation, granulation tissue, chronic pain, intravaginal hair growth, incompletely feminized external vulva, and need for revision surgery.  Patient accepts these risks and wishes to work towards obtaining surgery.  I also explained to her that with this surgical technique, we are relying on anterior blood supply to heal the surgical site, preventing me from performing anterior labiaplasty.  And therefore she may require a second stage feminizing labiaplasty to fully feminize the external vulva.  She understood this.  Patient will be instructed to discontinue hormone therapy 4 weeks prior to surgery and resume it 2 weeks postoperatively to control its risk of DVT.  Patient will perform a bowel prep 1 to 2 days prior to surgery.    Total time spent with patient was 60 min of which greater than 50% was in counseling.

## 2019-08-13 NOTE — PROGRESS NOTES
[facility]:  Care Coordination Note     SITUATION   Patient (Kun, She/Her) is a 24 year old who is receiving support for:  Clinic Care Coordination - Initial (vaginoplasty consultation)  .    BACKGROUND     Pt attended vaginoplasty consultation with Dr. Butler; pt accompanied by girlfriend Pauly.     Pt reported she does not have ongoing therapist, only mental health providers who wrote letters for surgery.     ASSESSMENT     Surgery              CGC Assessment  [facility] [place]: Enrolled  Patient has a therapist: Yes  Name of therapist: Jerri Castaneda from [place]  Therapist's phone number: [phone]  Letter of support #1: Received  Letter #1 Date: 03/15/19  Letter of support #2: Received(Markie Lopez)  Letter #2 Date: 05/18/19  Surgery being considered: Yes  Vaginoplasty: Yes          PLAN          Nursing Interventions:   [facility] program and services discussed with patient. Educational surgical packet provided and reviewed with patient. Process for accessing surgery discussed, including: WPATH standards of care, letters of support, treatment plan action steps, PA insurance process, surgery scheduling, and approximate timeline.     Pt has letter of support in chart, which is adequate for PA. LIDIA signed by patient. Surgeon s photography outcomes reviewed with patient. Pt questions answered within scope of practice.     Pt encouraged to establish ongoing therapy throughout vaginoplasty surgical process. Pt reported cost being an issue; pt has MA and it was explained that if she finds a provider who accepts MA then it should be no cost to her. Referrals were offered, pt will reach out when she wants them.     Pt was reminded that Letters are only good for one year, so updated letters will be necessary.     Follow-up plan:  See Dr. Butler's consult note.        Larry Schulz

## 2019-08-13 NOTE — TELEPHONE ENCOUNTER
Received RX-refill request through my chart and request passed refill protocol, Pt was last seen in clinic on 05/31/2019, with possible surgery plan today at noon, follow up appointment scheduled on 08/21/2019.  Request routed to prescribing provider/PCP to address/approve prescription number of refills.    Gonzalo Ortega RN

## 2019-08-13 NOTE — PATIENT INSTRUCTIONS
It was nice to meet you today. Per Dr. Butler's instruction, we have referred you to dermatology for pre-vaginoplasty hair removal, and urology to meet Dr. Morales and discuss surgery, when he is back from [facility]. Additionally, Dr. Butler would like you to establish a relationship with a therapist you trust, who will be available to you post surgery. Lastly, Dr. Butler asked that you work on clearing up the dermatitis rash on your abdomen prior to surgery. Please feel free to reach out with any questions or concerns.    Leisa Huston LPN  [facility]\

## 2019-08-15 ENCOUNTER — MYC REFILL (OUTPATIENT)
Dept: FAMILY MEDICINE | Facility: CLINIC | Age: 24
End: 2019-08-15

## 2019-08-15 DIAGNOSIS — G40.822 NONINTRACTABLE EPILEPTIC SPASMS WITHOUT STATUS EPILEPTICUS (H): ICD-10-CM

## 2019-08-15 DIAGNOSIS — F64.0 GENDER DYSPHORIA IN ADULT: Chronic | ICD-10-CM

## 2019-08-15 RX ORDER — LACOSAMIDE 100 MG/1
100 TABLET ORAL 2 TIMES DAILY
Qty: 60 TABLET | Refills: 0 | Status: CANCELLED | OUTPATIENT
Start: 2019-08-15

## 2019-08-15 RX ORDER — SPIRONOLACTONE 100 MG/1
100 TABLET, FILM COATED ORAL 2 TIMES DAILY
Qty: 120 TABLET | Refills: 3 | Status: CANCELLED | OUTPATIENT
Start: 2019-08-15

## 2019-08-19 ENCOUNTER — MYC REFILL (OUTPATIENT)
Dept: FAMILY MEDICINE | Facility: CLINIC | Age: 24
End: 2019-08-19

## 2019-08-19 DIAGNOSIS — G40.822 NONINTRACTABLE EPILEPTIC SPASMS WITHOUT STATUS EPILEPTICUS (H): ICD-10-CM

## 2019-08-19 RX ORDER — LACOSAMIDE 100 MG/1
100 TABLET ORAL 2 TIMES DAILY
Qty: 60 TABLET | Refills: 0 | Status: SHIPPED | OUTPATIENT
Start: 2019-08-19 | End: 2019-08-19

## 2019-08-19 RX ORDER — LACOSAMIDE 100 MG/1
100 TABLET ORAL 2 TIMES DAILY
Qty: 60 TABLET | Refills: 0 | Status: SHIPPED | OUTPATIENT
Start: 2019-08-19 | End: 2019-09-20

## 2019-08-19 NOTE — TELEPHONE ENCOUNTER
"Per PCP \"  Printed at green   Thx!    Routing comment      \"  RN reprinted with preceptor Dr. Apodaca's name and had her sign. Faxed to pharmacy. Fax successful    Lexy Martinez RN    "

## 2019-08-19 NOTE — TELEPHONE ENCOUNTER

## 2019-08-20 ENCOUNTER — MYC REFILL (OUTPATIENT)
Dept: FAMILY MEDICINE | Facility: CLINIC | Age: 24
End: 2019-08-20

## 2019-08-20 DIAGNOSIS — G40.822 NONINTRACTABLE EPILEPTIC SPASMS WITHOUT STATUS EPILEPTICUS (H): ICD-10-CM

## 2019-08-20 DIAGNOSIS — F64.0 GENDER DYSPHORIA IN ADULT: Chronic | ICD-10-CM

## 2019-08-20 RX ORDER — ESTRADIOL 2 MG/1
4 TABLET ORAL DAILY
Qty: 180 TABLET | Refills: 0 | Status: CANCELLED | OUTPATIENT
Start: 2019-08-20

## 2019-08-20 RX ORDER — LACOSAMIDE 100 MG/1
100 TABLET ORAL 2 TIMES DAILY
Qty: 60 TABLET | Refills: 0 | Status: CANCELLED | OUTPATIENT
Start: 2019-08-20

## 2019-08-21 ENCOUNTER — OFFICE VISIT (OUTPATIENT)
Dept: FAMILY MEDICINE | Facility: CLINIC | Age: 24
End: 2019-08-21
Payer: COMMERCIAL

## 2019-08-21 VITALS
RESPIRATION RATE: 16 BRPM | OXYGEN SATURATION: 99 % | DIASTOLIC BLOOD PRESSURE: 73 MMHG | SYSTOLIC BLOOD PRESSURE: 115 MMHG | HEART RATE: 75 BPM | WEIGHT: 165.4 LBS | BODY MASS INDEX: 25.07 KG/M2 | TEMPERATURE: 98 F | HEIGHT: 68 IN

## 2019-08-21 DIAGNOSIS — Z23 NEED FOR TDAP VACCINATION: ICD-10-CM

## 2019-08-21 DIAGNOSIS — L67.9 HAIR PROBLEM: ICD-10-CM

## 2019-08-21 DIAGNOSIS — F64.0 GENDER DYSPHORIA IN ADULT: Primary | Chronic | ICD-10-CM

## 2019-08-21 LAB
ALBUMIN SERPL-MCNC: 4.8 MG/DL (ref 3.8–5)
ALP SERPL-CCNC: 26.8 U/L (ref 31.7–110.5)
ALT SERPL-CCNC: 11.4 U/L (ref 0–45)
AST SERPL-CCNC: 15 U/L (ref 0–45)
BILIRUB SERPL-MCNC: <0.4 MG/DL (ref 0.2–1.3)
BILIRUBIN DIRECT: 0.1 MG/DL (ref 0.1–0.3)
PROT SERPL-MCNC: 6.9 G/DL (ref 6.8–8.8)

## 2019-08-21 ASSESSMENT — MIFFLIN-ST. JEOR: SCORE: 1540.81

## 2019-08-21 NOTE — PROGRESS NOTES
Preceptor Attestation:   Patient seen and discussed with the resident. Assessment and plan reviewed with resident and agreed upon.   Supervising Physician:  DO DO [facility]

## 2019-08-21 NOTE — PROGRESS NOTES
"       IZAIAH Tristan is a 24 year old individual that uses pronouns She/Her/Hers/Herself that presents today for followup of:  feminizing hormone therapy. Gender identity: female    Any special concerns today?  no current concerns    On hormones?  YES +++ taking pills/patch/gel      Due for labs?  Yes      +++ Refills of meds needed?  No    Recently saw plastics for vaginoplasty. Has to f/u with them, urology and derm.   ---    History reviewed. No pertinent surgical history.    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         Review of Systems:      General    Fat redistribution: no    Weight change: YES (up and down) HEENT    Voice change: no     Cardiovascular (CV)    Chest Pains: no    Shortness of breath: no Chest    Decreased exercise tolerance:  no    Breast changes/development: no     Gastrointestinal (GI)    Abdominal pain: no    Change in appetite: no Skin    Acne or oily skin: no    Change in hair: no     Genitourinary ()    Abnormal vaginal bleeding: no     Decreased spontaneous erections: YES    Change in libido: no    New sexual partners: YES Musculoskeletal    Leg pain or swelling: no     Psychiatric (Psych)    Depression: YES, stable    Anxiety/Panic: YES    Mood:  \"okay\"                    Physical Exam:     Vitals:    08/21/19 1556   BP: 115/73   Pulse: 75 " "  Resp: 16   Temp: 98  F (36.7  C)   TempSrc: Oral   SpO2: 99%   Weight: 75 kg (165 lb 6.4 oz)   Height: 1.715 m (5' 7.5\")     BMI= Body mass index is 25.52 kg/m .   Wt Readings from Last 10 Encounters:   08/21/19 75 kg (165 lb 6.4 oz)   08/13/19 76.8 kg (169 lb 4.8 oz)   05/31/19 72.6 kg (160 lb)   03/13/19 79.7 kg (175 lb 12.8 oz)     Appearance: Female appearance and dress  General: well appearing, pleasant    HEENT: No signs of trauma. No rhinorrhea. Moist membranes.   Eyes: Conjunctivae and sclerae are normal. Pupils are equal.   Neck: Normal range of motion.    Resp: No respiratory distress. Normal breath sounds throughout without rales/wheezing.   CV: normal RRR, no murmurs. Normal capillary refill.  Abdomen: non-distended, no TTP, no hepatomegaly  MSK: Normal range of motion. No obvious deformity.  Neuro: The patient is alert and interactive. Speech normal. No gross focal symptoms.  Skin: No lesion or sign of trauma noted on exposed skin.   Psych: Affect is appropriate and concordant with mood, behavior is normal.         Labs:   Pending     Assessment and Plan     1. 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 Due for Tdap   - Tdap given today in clinic    Contraception:   not needed    Counselled patient about controlled substances: No    Follow up:  Follow up in 3 months.  Results by mychart  Questions were elicited and answered.     Josie Macias MD  "

## 2019-08-22 ENCOUNTER — TELEPHONE (OUTPATIENT)
Dept: DERMATOLOGY | Facility: CLINIC | Age: 24
End: 2019-08-22

## 2019-08-22 NOTE — TELEPHONE ENCOUNTER
[facility] Call Center    Phone Message    May a detailed message be left on voicemail: yes    Reason for Call: Other: Pt calling to schedule an appt for hair removal in the crotch area. Pt says this is to prep pt for the vagina plasty procedure that will be had with the surgery dept.     Action Taken: Message routed to:  [facility] derm

## 2019-08-23 NOTE — TELEPHONE ENCOUNTER
Pt called saying she got a call to set up an appointment wit Dr. Keyes for laser hair removal. There is nothing in the note or anything for me to go off of, and since Dr. Keyes's schedule is full I am not sure where to schedule this pt or if she should be seen in [place]. I let the pt know I will rout this to Dr. Keyes and her nurses and let them know they will get a hold of her Monday. Pt understood and had no questions or concerns at this time.   THELMA Irby

## 2019-08-27 NOTE — TELEPHONE ENCOUNTER
New laser hair removals from Dr Stafford will be intaken in Dr. Gibbons Monday clinic    Add patient to list for call back to start with him when schedule opens

## 2019-08-28 ENCOUNTER — MYC REFILL (OUTPATIENT)
Dept: FAMILY MEDICINE | Facility: CLINIC | Age: 24
End: 2019-08-28

## 2019-08-28 DIAGNOSIS — F64.0 GENDER DYSPHORIA IN ADULT: Chronic | ICD-10-CM

## 2019-08-28 RX ORDER — ESTRADIOL 2 MG/1
4 TABLET ORAL DAILY
Qty: 180 TABLET | Refills: 0 | Status: CANCELLED | OUTPATIENT
Start: 2019-08-28

## 2019-08-28 NOTE — TELEPHONE ENCOUNTER
Patient added to Dr. Biggs's list.  I notified her via [employer] (see 8/27/19 MyChart encounter).  Gilma Sanderson RN

## 2019-09-05 NOTE — TELEPHONE ENCOUNTER
MEDICAL RECORDS REQUEST   [facility]  Urology Clinic  [address] [place] [address]  PHONE: [phone]  Fax: [phone]        FUTURE VISIT INFORMATION                                                   Kun Florian YOB: 1995 scheduled for future visit at [facility]    APPOINTMENT INFORMATION:    Date: 10/9/19 1PM     Provider:  Toribio Morales MD     Reason for Visit/Diagnosis: GENDER CARE     REFERRAL INFORMATION:    Referring provider:  Oniel Butler     Specialty: MD     Referring providers clinic:  [facility]     Clinic contact number: [phone]    RECORDS REQUESTED FOR VISIT                                                     NOTES  STATUS/DETAILS   OFFICE NOTE from referring provider  yes   OFFICE NOTE from other specialist  no   DISCHARGE SUMMARY from hospital  no   DISCHARGE REPORT from the ER  no   OPERATIVE REPORT  no   MEDICATION LIST  no     PRE-VISIT CHECKLIST      Record collection complete Yes   Appointment appropriately scheduled           (right time/right provider) Yes   MyChart activation Yes   Questionnaire complete If no, please explain: In process      Completed by: Yakelin Beasley

## 2019-09-20 ENCOUNTER — MYC REFILL (OUTPATIENT)
Dept: FAMILY MEDICINE | Facility: CLINIC | Age: 24
End: 2019-09-20

## 2019-09-20 ENCOUNTER — MYC MEDICAL ADVICE (OUTPATIENT)
Dept: FAMILY MEDICINE | Facility: CLINIC | Age: 24
End: 2019-09-20

## 2019-09-20 DIAGNOSIS — G40.822 NONINTRACTABLE EPILEPTIC SPASMS WITHOUT STATUS EPILEPTICUS (H): ICD-10-CM

## 2019-09-20 RX ORDER — LACOSAMIDE 100 MG/1
100 TABLET ORAL 2 TIMES DAILY
Qty: 180 TABLET | Refills: 0 | Status: SHIPPED | OUTPATIENT
Start: 2019-09-20 | End: 2019-10-21

## 2019-09-20 RX ORDER — LACOSAMIDE 100 MG/1
100 TABLET ORAL 2 TIMES DAILY
Qty: 60 TABLET | Refills: 0 | Status: CANCELLED | OUTPATIENT
Start: 2019-09-20

## 2019-09-20 RX ORDER — LACOSAMIDE 100 MG/1
100 TABLET ORAL 2 TIMES DAILY
Qty: 180 TABLET | Refills: 0 | Status: SHIPPED | OUTPATIENT
Start: 2019-09-20 | End: 2019-09-20

## 2019-09-20 NOTE — TELEPHONE ENCOUNTER
Placed Vimpat refill per patient's request    Josie Macias MD  PGY-2 Family Medicine Resident [facility]  Pager: [phone]     9/20/2019  4:05 PM

## 2019-09-20 NOTE — TELEPHONE ENCOUNTER

## 2019-09-20 NOTE — TELEPHONE ENCOUNTER
Refill request approved by PCP/authorized by attending preceptor faxed to patein's preferred pharmacy, patient notified.    Gonzalo Ortega RN

## 2019-09-25 ASSESSMENT — ENCOUNTER SYMPTOMS
POOR WOUND HEALING: 0
NAIL CHANGES: 0
SKIN CHANGES: 0

## 2019-10-02 ENCOUNTER — PRE VISIT (OUTPATIENT)
Dept: UROLOGY | Facility: CLINIC | Age: 24
End: 2019-10-02

## 2019-10-02 NOTE — TELEPHONE ENCOUNTER
Chief Complaint : New-Referred by Dr. Butler    Hx/Sx: Surgery Consult     Records/Orders: Available     Pt Contacted: n/a    At Rooming: normal

## 2019-10-07 ENCOUNTER — MYC REFILL (OUTPATIENT)
Dept: FAMILY MEDICINE | Facility: CLINIC | Age: 24
End: 2019-10-07

## 2019-10-07 DIAGNOSIS — F64.0 GENDER DYSPHORIA IN ADULT: Chronic | ICD-10-CM

## 2019-10-07 RX ORDER — ESTRADIOL 2 MG/1
4 TABLET ORAL DAILY
Qty: 180 TABLET | Refills: 0 | Status: CANCELLED | OUTPATIENT
Start: 2019-10-07

## 2019-10-07 RX ORDER — SPIRONOLACTONE 100 MG/1
100 TABLET, FILM COATED ORAL 2 TIMES DAILY
Qty: 120 TABLET | Refills: 3 | Status: CANCELLED | OUTPATIENT
Start: 2019-10-07

## 2019-10-09 ENCOUNTER — OFFICE VISIT (OUTPATIENT)
Dept: UROLOGY | Facility: CLINIC | Age: 24
End: 2019-10-09
Payer: COMMERCIAL

## 2019-10-09 ENCOUNTER — PRE VISIT (OUTPATIENT)
Dept: UROLOGY | Facility: CLINIC | Age: 24
End: 2019-10-09

## 2019-10-09 VITALS
SYSTOLIC BLOOD PRESSURE: 116 MMHG | BODY MASS INDEX: 25.01 KG/M2 | WEIGHT: 165 LBS | HEART RATE: 85 BPM | DIASTOLIC BLOOD PRESSURE: 69 MMHG | HEIGHT: 68 IN

## 2019-10-09 DIAGNOSIS — F64.9 GENDER DYSPHORIA: Primary | ICD-10-CM

## 2019-10-09 ASSESSMENT — MIFFLIN-ST. JEOR: SCORE: 1539

## 2019-10-09 ASSESSMENT — PAIN SCALES - GENERAL: PAINLEVEL: NO PAIN (0)

## 2019-10-09 NOTE — PROGRESS NOTES
[facility] Consult H&P    Name: Kun Florian    MRN: [id number]   YOB: 1995                 Chief Complaint:   Gender Dysphoria; seeking vaginoplasty + orchiectomy          History of Present Illness:   Kun Florian is a 24 year old transgender seen in consultation for gender dysphoria and vaginoplasty consideration.    Patient has been living as a female for 2.5 years.  Preferred pronouns are: she, her  The patient has been on exogenous hormones since: cris, estradiol.  [facility].  In terms of an intimate relationship, the patient is interested in women, currently in relationship.  Desires full vaginal canal for receptive intercourse.  In terms of fertility, the patient: not interested in sperm-banking.    The patient has obtained letters of support. They are adequate.    Long-term surgical goals for the patient include: orchiectomy and vaginoplasty. Also considering hip augmentation.     Has a referral to Dermatology for hair removal - appointment 10/21/19.     The patient is here today expressing interest in orchiectomy and vaginoplasty.         Past Medical History:   Gender Dysphoria         Past Surgical History:   None           Social History:     Social History     Tobacco Use     Smoking status: Never Smoker     Smokeless tobacco: Never Used   Substance Use Topics     Alcohol use: Yes     Comment: occasional, socially            Family History:     Family History   Problem Relation Age of Onset     Breast Cancer Mother      Bleeding Disorder Father               Allergies:     Allergies   Allergen Reactions     Codeine Hives     Nickel Rash            Medications:     Current Outpatient Medications   Medication Sig     estradiol (ESTRACE) 2 MG tablet Take 2 tablets (4 mg) by mouth daily     Lacosamide (VIMPAT) 100 MG TABS tablet Take 1 tablet (100 mg) by mouth 2 times daily     spironolactone (ALDACTONE) 100 MG tablet Take 1 tablet (100  "mg) by mouth 2 times daily     No current facility-administered medications for this visit.              Review of Systems:    ROS: 14 point ROS neg other than the symptoms noted above in the HPI.          Physical Exam:   /69   Pulse 85   Ht 1.715 m (5' 7.5\")   Wt 74.8 kg (165 lb)   BMI 25.46 kg/m    General: age-appropriate in NAD  HEENT: Head AT/NC, EOMI, CN Grossly intact  Resp: no respiratory distress  CV: heart rate regular  Abdomen: not obese, soft, non-distended, non-tender. No organomegaly. There is a contact dermatitis from the umbilicus to the pubis including erythema and dry scaling skin without weeping  : circumcised male, orthotopic meatus, no skin lesions, scrotum symmetric, testes 20mL and normal without tenderness  LE: no edema.   Neuro: grossly intact  Motor: excellent strength throughout  Skin: clear of rashes or ecchymoses.          Outside records:    I spent 10 minutes reviewing outside records.         Assessment and Plan:   24 year old transgender female who desires gender-affirming surgery with orchiectomy and vaginoplasty.    She has a persistent, well documented gender dysphoria. She has capacity to make a fully informed decision and to consent for treatment. Her mental health issues are well controlled. She has been on continuous hormones for 2 years. We received letters of support.     The patient meets all of these criteria. We discussed that gender affirmation surgery should be considered permanent. We discussed risks/complications of rectal injury, rectovaginal fistula, bleeding, fluid collection, infection, injury to surrounding structures, flap loss, sensory loss, wound dehiscence, vaginal prolapse, vaginal shrinkage/stenosis, need for lifelong dilation, urinary stream abnormalities, DVT/PE and need for revision surgery.     We discussed the option for zero depth. She remains interested in a full vaginoplasty.     We also discussed the need to stop hormones " yareli-procedurally, which Dr. Butler will finalize.     We discussed that transgender vaginoplasty for this patient would include: penectomy, orchiectomy, clitoroplasty, labiaplasty, urethral reconstruction, creation of a vagina, skin graft, colpopexy to suspend the vagina, and scrotoplasty.    She desires a combined procedure rather than orchiectomy first.    Plan: Will schedule vaginoplasty + orchiectomy after completion of hair removal and improvement of dermatitis on abdomen    Toribio Morales MD   Reconstructive Urology  [facility]

## 2019-10-09 NOTE — LETTER
10/9/2019       RE: Kun Florian  [address] [facility] [address]     Dear Colleague,    Thank you for referring your patient, Kun Florian, to the [facility] UROLOGY AND INST FOR PROSTATE AND UROLOGIC CANCERS at [facility]. Please see a copy of my visit note below.    [facility] Consult H&P    Name: Kun Florian    MRN: [id number]   YOB: 1995                 Chief Complaint:   Gender Dysphoria; seeking vaginoplasty + orchiectomy          History of Present Illness:   Kun Florian is a 24 year old transgender seen in consultation for gender dysphoria and vaginoplasty consideration.    Patient has been living as a female for 2.5 years.  Preferred pronouns are: she, her  The patient has been on exogenous hormones since: cris, estradiol.  [facility].  In terms of an intimate relationship, the patient is interested in women, currently in relationship.  Desires full vaginal canal for receptive intercourse.  In terms of fertility, the patient: not interested in sperm-banking.    The patient has obtained letters of support. They are adequate.    Long-term surgical goals for the patient include: orchiectomy and vaginoplasty. Also considering hip augmentation.     Has a referral to Dermatology for hair removal - appointment 10/21/19.     The patient is here today expressing interest in orchiectomy and vaginoplasty.         Past Medical History:   Gender Dysphoria         Past Surgical History:   None           Social History:     Social History     Tobacco Use     Smoking status: Never Smoker     Smokeless tobacco: Never Used   Substance Use Topics     Alcohol use: Yes     Comment: occasional, socially            Family History:     Family History   Problem Relation Age of Onset     Breast Cancer Mother      Bleeding Disorder Father               Allergies:     Allergies   Allergen Reactions     Codeine Hives     Nickel  "Rash            Medications:     Current Outpatient Medications   Medication Sig     estradiol (ESTRACE) 2 MG tablet Take 2 tablets (4 mg) by mouth daily     Lacosamide (VIMPAT) 100 MG TABS tablet Take 1 tablet (100 mg) by mouth 2 times daily     spironolactone (ALDACTONE) 100 MG tablet Take 1 tablet (100 mg) by mouth 2 times daily     No current facility-administered medications for this visit.              Review of Systems:    ROS: 14 point ROS neg other than the symptoms noted above in the HPI.          Physical Exam:   /69   Pulse 85   Ht 1.715 m (5' 7.5\")   Wt 74.8 kg (165 lb)   BMI 25.46 kg/m     General: age-appropriate in NAD  HEENT: Head AT/NC, EOMI, CN Grossly intact  Resp: no respiratory distress  CV: heart rate regular  Abdomen: not obese, soft, non-distended, non-tender. No organomegaly. There is a contact dermatitis from the umbilicus to the pubis including erythema and dry scaling skin without weeping  : circumcised male, orthotopic meatus, no skin lesions, scrotum symmetric, testes 20mL and normal without tenderness  LE: no edema.   Neuro: grossly intact  Motor: excellent strength throughout  Skin: clear of rashes or ecchymoses.          Outside records:    I spent 10 minutes reviewing outside records.         Assessment and Plan:   24 year old transgender female who desires gender-affirming surgery with orchiectomy and vaginoplasty.    She has a persistent, well documented gender dysphoria. She has capacity to make a fully informed decision and to consent for treatment. Her mental health issues are well controlled. She has been on continuous hormones for 2 years. We received letters of support.     The patient meets all of these criteria. We discussed that gender affirmation surgery should be considered permanent. We discussed risks/complications of rectal injury, rectovaginal fistula, bleeding, fluid collection, infection, injury to surrounding structures, flap loss, sensory loss, " wound dehiscence, vaginal prolapse, vaginal shrinkage/stenosis, need for lifelong dilation, urinary stream abnormalities, DVT/PE and need for revision surgery.     We discussed the option for zero depth. She remains interested in a full vaginoplasty.     We also discussed the need to stop hormones yareli-procedurally, which Dr. Butler will finalize.     We discussed that transgender vaginoplasty for this patient would include: penectomy, orchiectomy, clitoroplasty, labiaplasty, urethral reconstruction, creation of a vagina, skin graft, colpopexy to suspend the vagina, and scrotoplasty.    She desires a combined procedure rather than orchiectomy first.    Plan: Will schedule vaginoplasty + orchiectomy after completion of hair removal and improvement of dermatitis on abdomen    Toribio Morales MD   Reconstructive Urology  [facility]

## 2019-10-09 NOTE — NURSING NOTE
Chief Complaint   Patient presents with     Consult     New patient consult for vaginoplasty     Shilpa Bess, [facility]

## 2019-10-10 ENCOUNTER — MYC MEDICAL ADVICE (OUTPATIENT)
Dept: UROLOGY | Facility: CLINIC | Age: 24
End: 2019-10-10

## 2019-10-14 ENCOUNTER — TELEPHONE (OUTPATIENT)
Dept: PLASTIC SURGERY | Facility: CLINIC | Age: 24
End: 2019-10-14

## 2019-10-19 ENCOUNTER — MYC REFILL (OUTPATIENT)
Dept: FAMILY MEDICINE | Facility: CLINIC | Age: 24
End: 2019-10-19

## 2019-10-19 DIAGNOSIS — G40.822 NONINTRACTABLE EPILEPTIC SPASMS WITHOUT STATUS EPILEPTICUS (H): ICD-10-CM

## 2019-10-19 RX ORDER — LACOSAMIDE 100 MG/1
100 TABLET ORAL 2 TIMES DAILY
Qty: 180 TABLET | Refills: 0 | Status: CANCELLED | OUTPATIENT
Start: 2019-10-19

## 2019-10-21 ENCOUNTER — MYC REFILL (OUTPATIENT)
Dept: FAMILY MEDICINE | Facility: CLINIC | Age: 24
End: 2019-10-21

## 2019-10-21 DIAGNOSIS — G40.822 NONINTRACTABLE EPILEPTIC SPASMS WITHOUT STATUS EPILEPTICUS (H): ICD-10-CM

## 2019-10-22 RX ORDER — LACOSAMIDE 100 MG/1
100 TABLET ORAL 2 TIMES DAILY
Qty: 180 TABLET | Refills: 0 | Status: SHIPPED | OUTPATIENT
Start: 2019-10-22 | End: 2019-10-23

## 2019-10-23 RX ORDER — LACOSAMIDE 100 MG/1
100 TABLET ORAL 2 TIMES DAILY
Qty: 180 TABLET | Refills: 0 | Status: SHIPPED | OUTPATIENT
Start: 2019-10-23 | End: 2019-11-19

## 2019-10-23 NOTE — TELEPHONE ENCOUNTER
"Per PCP \"  I approved the refill, however it seems like it printed at [facility] as I don't have a JORDYN. This had not happened before, not sure why or how to fix it, but patient needs the anti-sezure medication as soon as possible   Thanks!   Gildardo      \"RN printed rx and had preceptor Dr. Petit sign. Faxed to pharmacy. Fax successful    Lexy Martinez RN    "

## 2019-10-28 ENCOUNTER — TELEPHONE (OUTPATIENT)
Dept: OTHER | Facility: OUTPATIENT CENTER | Age: 24
End: 2019-10-28

## 2019-10-28 NOTE — TELEPHONE ENCOUNTER
[facility] Telephone Intake    Date:  2019  Client Name:  Kun Florian  Preferred Name: Kun   MRN:  [id number]   :  1995       Age:  24 year old     Presenting Problem / Reason for Assessment   (Clinical History &Symptoms):     Patient is coming in for a letter of support for vaginoplasty. Says she has two letters already but one is about to . Can see Care Coordination note from Larry Schulz dated 3/12/19.    Suggested Program: TG    Length of time experiencing Symptoms:  Several years    Seen Other Providers (if so, where):  M.D. :    Therapist:    Psychiatrist:      Is presenting concern primarily sexual or mental health:      Medications:  See chart    Prescribing Physician:      Diagnosis (if known):  Gender dysphoria    Referral Source:      Follow Up:    Insurance Benefits to be evaluated.  Note will be entered when validated.    Patient wishes to be contacted regarding Insurance benefits:  Yes.    Please Verify Registration    Please send Welcome Packet and document date sent.

## 2019-10-28 NOTE — TELEPHONE ENCOUNTER
.Per: ELMIRA li/ SHONDA/NEW DIRECTIONS  Copay$ 0   Ded$ 2,750; Met$ 2,750   Coins 25%    Out of Pocket Max$ 6,850 ; Met$ 2,182     Psych testing require auth (30414/28706)? no  Extended therapy require auth (11715)?  no    Exclusions:   Family Therapy (04712/44937)  NO    Marriage couple counseling  YES   Transgender/Gender Dysphoria no   CSB no   Sexual dysfunction no    Patient Contacted about benefits:  SPOKE TO PATIENT RE: DOMINIQUE. [place] HAS GENDER LISTED MALE - PATIENT NEEDS TO UPDATED Epic WITH LEGAL GENDER MARKER (SEE ID - UPDATE Epic)  Date contacted: 10/28/2019

## 2019-10-30 DIAGNOSIS — F64.9 GENDER DYSPHORIA: Primary | ICD-10-CM

## 2019-10-30 RX ORDER — CEFAZOLIN SODIUM 1 G/50ML
1 INJECTION, SOLUTION INTRAVENOUS SEE ADMIN INSTRUCTIONS
Status: CANCELLED | OUTPATIENT
Start: 2019-10-30

## 2019-10-30 RX ORDER — CEFAZOLIN SODIUM 2 G/50ML
2 SOLUTION INTRAVENOUS
Status: CANCELLED | OUTPATIENT
Start: 2019-10-30

## 2019-11-02 ENCOUNTER — TELEPHONE (OUTPATIENT)
Dept: UROLOGY | Facility: CLINIC | Age: 24
End: 2019-11-02

## 2019-11-02 PROBLEM — F64.9 GENDER DYSPHORIA: Status: ACTIVE | Noted: 2019-11-02

## 2019-11-02 NOTE — TELEPHONE ENCOUNTER
Patient is scheduled for surgery with Dr. Morales      Spoke or left message with: Kun    Date of Surgery: 11/22/19    Location[facility] OR    Informed patient they will need an adult  yes    Pre-op with surgeon (if applicable): n/a    H&P: Scheduled with PAC 11/13/19    Additional imaging/appointments: n/a    Surgery packet: mailed 11/4/19     Additional comments: n/a

## 2019-11-04 ENCOUNTER — PRE VISIT (OUTPATIENT)
Dept: SURGERY | Facility: CLINIC | Age: 24
End: 2019-11-04

## 2019-11-04 NOTE — TELEPHONE ENCOUNTER
FUTURE VISIT INFORMATION      SURGERY INFORMATION:    Date: 11/22/19    Location[facility]    Surgeon:  Kelin Morales    Anesthesia Type:  General    RECORDS REQUESTED FROM:       Primary Care Provider: [facility]lett

## 2019-11-06 ENCOUNTER — MEDICAL CORRESPONDENCE (OUTPATIENT)
Dept: HEALTH INFORMATION MANAGEMENT | Facility: CLINIC | Age: 24
End: 2019-11-06

## 2019-11-06 ENCOUNTER — OFFICE VISIT (OUTPATIENT)
Dept: OTHER | Facility: OUTPATIENT CENTER | Age: 24
End: 2019-11-06
Payer: COMMERCIAL

## 2019-11-06 DIAGNOSIS — F64.0 GENDER DYSPHORIA IN ADULT: Primary | Chronic | ICD-10-CM

## 2019-11-06 NOTE — LETTER
November 6, 2019    Dr. Oniel Butler     Re: Kun Florian  YOB: 1995  MRN:  [id number]    Secondary letter of support for gender confirmation surgery    Dear Dr. Butler,    I am writing this letter in support of Ms. Florian s intention to pursue gender affirming surgery. Ms. Florian is a 24-year-old person assigned male at birth who identifies as female who has been obtaining services through the [facility] s ([facility]) Transgender Health Services since November 6, 2019. 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 Ms. Florian established psychological services at [facility] in November 2019 in preparation for surgical interventions to address her gender and anatomical dysphoria. Thus far in treatment, Ms. Florian has followed treatment recommendations and has provided indication that she will continue to be active in the treatment process moving forward. I am writing this letter in support of her pursuit of GCS.       As part of her diagnostic evaluation, Ms. Florian completed a set of psychological and gender specific measures consisting of empirically supported assessments of gender dysphoria, psychosocial adjustment, and sexual health. Ms. Florian was diagnosed with 302.85 - Gender Dysphoria at the start of her treatment through [facility]. Ms. Florian also has a history of anxiety and depressive that were exacerbated by gender dysphoria. She has exhibited a history of gender and anatomic dysphoria related to her birth assigned sex dating back to adolescence. While she reports great benefit from hormone blockers/feminizing hormones, which she started in September 2017, Ms. Florian greatly desires gender confirmation surgery to decrease her gender and anatomical dysphoria. She has given her informed consent to work towards GCS, and has support and knowledge regarding the aftercare process.    Despite the daily experience of  discomfort and distress with her gender and anatomical dysphoria, Ms. Florian is an energetic, resilient, and motivated individual. Ms. Florian has lived full time as female, used female pronouns since fall of 2017.  She was granted a legal name change in summer of 2018.    Ms. Florian currently meets the WPATH Standards of Care for surgical GCS, as well as internal prerequisites/eligibility criteria for such procedures including:    WPATH Criteria for Genital Surgery (Two Referrals)    1. Persistent well-documented gender dysphoria    2. Capacity to make a fully informed decision and to consent to treatment    3. Age of majority in a given country    4. If significant medical or mental health concerns are present, they must be reasonably well controlled.    5. 12 continuous months of hormone therapy as appropriate to the patient s gender goals (unless the patient has a medical contraindication or is otherwise unable or unwilling to take hormones).     6. 12 continuous months of living in a gender role that is congruent with their gender identity; although not an explicit criterion, it is recommended that these patients also have regular visits with a mental health or other medical professional.    Surgery is considered medically necessary treatment by the WPATH Standards of Care for transgender persons. On behalf of our Transgender Services team, I support her decision to pursue medically necessary surgery at this time.     In some cases, denial of access to this surgery can lead to increased isolation, decreased work performance, and decreased sexual and interpersonal functioning. Surgery is expected to alleviate her dysphoria, increase comfort with self, and improve interpersonal, sexual, and vocational functioning.    Ms. Florian has educated herself about the surgery and is aware of its risks and benefits. Given her concerns and the availability of this type of surgery, I support her choice of surgeon.    The  team s support is based on the psychological indication for surgery and did not include a physical examination to assess medical contra-indications for the surgical procedure. This letter of support is valid for one year from the date of approval.      If you have any questions, or are in need of additional information, please do not hesitate to contact me.      Sincerely,        Halina Clayton PsyD, LMFT  Pronouns: she/her/hers   Licensed Marital and Family Therapy  [facility] / [facility]  Department of Family Medicine & Community Health  [employer] [facility] [facility] [employer]  [phone]

## 2019-11-06 NOTE — PROGRESS NOTES
[facility]  [facility] in Human Sexuality  Department of Family Medicine & Community Health  [employer]   [address]               [place] [address]  Phone: [phone]  Fax: [phone]  [email or web address]    Transgender Diagnostic (TG) Diagnostic Assessment Interview    Date of Service: 11/06/19  Client Name: Kun Florian  YOB: 1995  24 year old  MRN:  [id number]  Gender/Gender Identity: Female  Treating Provider: Halina Clayton PsyD, LMFT  Program: TG  Type of Session: Assessment  Present in Session: Client  Number of Minutes:  53                     Ethnicity:      Referral Source [facility]     Chief Complaint/Presenting Problem and Goals: Client presented with desire to obtain letter of support for gender confirmation surgery and to establish support for post-operative care.      ________________________________________________________________    Transgender Health Services  Program-Specific Information    History of gender dysphoria: Client reported that she got hints about gender incongruence early on in life, but explored gender and sexuality in early adulthood.     Client  reported that in August 2017 came out as transgender to girlfriend, then friends. Client reported that initially mom was not supportive, but has come around. Client reported that her father passed away in 2016 so she never got to come out as trans to him.     When was the first time you felt your gender did not fit your sex assigned at birth? Client reported that in middle school she tried on her moms clothes and felt confusion at that time. Client rerouted that she confided in her previous partners that she didn't like her genitalia or her body.    Body Image/Anatomical dysphoria:  How do you feel about your body? C;oetm re[rpted growing up in a highly Jew home and did not have a lot of space to have feelings about it. Client reported that now  "she feels fairly euphoric about her body due to being on HRT, feeling validation from strangers and peers. Client reported that dysphoria can impact her ability to be sexual, but other days she feels confident and secure.     Social Supports:   Who are your social supports? Client reported that her family members, girlfriend Pauly, and friends are all very supportive. Client reports negative experiences especially around being misgendered, internalized transphobia.     _________________________________________________________________________    Mental Health History: Client reported a history of suicidal thoughts, anxiety, depression that has been off and on. Client reported no plan or intent. Client reported that she saw Markie Gutierrez for three appointments to obtain letter of support. Client reported that she also saw Jerri Castaneda in [place] for therapeutic services as well to address gender dysphoria.     Verbally administer [place] - Suicide Severity Rating Scale (C-SSRS). Use \"Screenings\" tab (Epic left side bar)    Substance Use: Client reported that she socially drinks in a celebratory capacity. Client reported that she uses cannabis once and a while.     Medical History Client has epilepsy and is treated with medication. Client is scheduled to have an orchiectomy 2019. Client [facility] a [facility]. Client goes to [facility] for HRT.     Relationships/Social History Client reported that she grew up in [place] with her parents who  when she was 10 years old then would spend every other week with  parents. Client reported being caught in the middle of their conflict. Client reported that she has two adult sisters that she is not close to. Client reported that she distanced herself from her dad after he told her she would burn in hell for being díaz. Client reported that her father  in  and she was there for him during " that time. Client described relationship with her mom as very healthy.     Current Significant Relationship/Partner:Client is dating Pauly for the past 3 months. Client reported that they started talking on Twitter. Client reported new relationship.Client reported being polyamorous and in a relationship with three women at this time.     Occupational history: Client reported that she works at [employer] now.   Legal Issues: None    _________________________________________________________________________     CONCLUSIONS    Symptoms: Irritable/banks; anxiety/worry; nervous/shaky; avoidance/denial; shy or sensitive; low self-esteem; self-hatred, guilt, shame; feel ugly/poor body image; memory problems; easily distracted; genital image concerns; gender concerns.       Mental Status   Appearance:  Casually dressed and Adequately groomed  Behavior/relationship to examiner/demeanor:  Cooperative  Orientation: Oriented to person, place, time and situation  Speech Rate:  Normal  Speech Spontaneity:  Normal  Mood:  comfortable  Affect:  Appropriate/mood-congruent  Thought Process (Associations):  Logical, Linear and Goal directed  Thought Content:  no evidence of suicidal or homicidal ideation, no overt psychosis and denies suicidal ideation, intent or thoughts  Abnormal Perception:  None  Attention/Concentration:  Normal  Language:  Intact  Insight:  Good  Judgment:  Good        DSM-5 Diagnosis:    Encounter Diagnosis   Name Primary?     Gender dysphoria in adult - trangender female Yes         Interactive Complexity  Communication difficulties present during current the psychiatric procedure included:    1. N/A  Conclusions/Recommendations/Initial Treatment Goals:    The client is a 24 year old American person assigned male at birth who identifies as female. Based on the client's current report of symptoms, client meets criteria for gender dysphoria in adult/adolscence. The client's mental health concerns affect client's  ability to function interpersonally, sexually and have been causing clinically significant distress. The client reports social alcohol use, and cannabis use on rare occasion. Client denies safety concerns. Based on the client's reported symptoms and impact on functioning, the plan for the patient is:  1. Start supportive individual/family therapy with a provider specialized in gender.   2. Continue to assess the impact of client's family and relational patterns on their current well-being.   3. Coordinate care as needed with medical, psychological, and family providers.    Halina Clayton PsyD, LMFT

## 2019-11-12 ENCOUNTER — OFFICE VISIT (OUTPATIENT)
Dept: DERMATOLOGY | Facility: CLINIC | Age: 24
End: 2019-11-12
Payer: COMMERCIAL

## 2019-11-12 DIAGNOSIS — F64.9 GENDER DYSPHORIA: ICD-10-CM

## 2019-11-12 DIAGNOSIS — L30.9 DERMATITIS: Primary | ICD-10-CM

## 2019-11-12 PROCEDURE — 99202 OFFICE O/P NEW SF 15 MIN: CPT | Performed by: DERMATOLOGY

## 2019-11-12 RX ORDER — TRIAMCINOLONE ACETONIDE 1 MG/G
OINTMENT TOPICAL
Qty: 454 G | Refills: 3 | Status: ON HOLD | OUTPATIENT
Start: 2019-11-12 | End: 2020-09-02

## 2019-11-12 ASSESSMENT — PAIN SCALES - GENERAL: PAINLEVEL: NO PAIN (0)

## 2019-11-12 NOTE — PROGRESS NOTES
"[facility] Dermatology Note      Dermatology Problem List:  1. Gender dysphoria, pending vaginoplasty  - [facility] PA letter prepared 11/12/19  2. Dermatitis, abdomen, upper extremities back. Ddx contact dermatitis, psoriasis, considered dermatitis herpetiformis, Darier's disease.   - triamcinolone 0.1% ointment BID  - low threshold for biopsy at follow-up    Encounter Date: Nov 12, 2019    CC:   Chief Complaint   Patient presents with     Laser Consultation     Kun is visiting to discuss R     SOGI Data:  Sex Assigned at Birth: Male  Gender Identity: Transgender Female / Male-to-Female  Pronouns: She/her/hers  Sexual Orientation: Bisexual    History of Present Illness:  Ms. Kun Florian is a 24 year old adult who presents for evaluation of laser hair removal as part of pre-operative preparation for vaginoplasty. The patient was referred by Dr. Butler (Plastic Surgery). She has socially transitioned and been maintained on a feminizing hormone therapy consistent of estradiol and spirolactone. She has prior had laser hair removal on the face at an outside provider. She is tentatively planning for vaginoplasty in the next year, pending laser hair removal. Has never had electrolysis or laser hair removal to the genital area, though has read extensively about this online.    Of note, patient also reports about 1 year history of an itchy rash on the abdomen and back, occasionally extremities. She states this was previously diagnosed as a jaylene allergy and she has been prescribed a medication that came in a \"pink tube\". She is currently not using anything on the other. No personal or family history of eczema. No history of psoriasis. No personal or family history of celiac disease.    Health otherwise stable. No other skin concerns.     Dermatology Safety Transgender Patient Intake:  Is the patient pending upcoming gender affirmation surgery: Yes  IF yes, what type of surgery is the upcoming expected " affirmation surgery(phalloplasty/vaginoplasty/facial surgery/NA)? Vaginoplasty  Has the patient met with plastic surgeon prior to this visit: Yes  What is the goal date of the upcoming expected affirmation surgery (date/ NA)? Pending hair removal, potentially August 2020.   Is the patient on hormonal therapy(if yes list what type (estrogen/cris/estradiol or testosoterone) and length of time treated):  Yes; estradiol and sprionolactone; been on therapy for > 2 years    Is the patient here for laser hair removal assessment:  Yes  Has the patient had laser hair removal in the past?  Yes, on the face  If, so, how many approximate weeks/months/years prior: 1 year ago  Was insurance coverage pursued for laser hair removal procedure (Yes/no/pending)?  No  Was insurance coverage obtained (yes/no/pending)?  No    Has the patient ever had any other minimally invasive cosmetic procedures such as the following:  Electrolysis:  No  Botulinum toxin:  No  Fillers (list filler type and location):  No  Chemical peels: No  Lasers: No  Intense pulsed light: No  Broad band light: No  Microneedling: No  Has the patient every had any unknown injectable products or other injectable products? (If yes, please list details if available):  If any minimally invasive  cosmetic procedures performed prior to presentation to our clinic were they performed by a bright COOK/ NP/RN/ other or nelson: N/a    Has the patient had any prior surgeries as follows:  Breast augmentation:  No  Breast removal:  No  Genital:  No  Face:  No  Hair transplant:  No    Does the patient have any other concerns regarding the following they would like to address at future appointments:  Skin tone/texture:  No  Rhytides:  No  Scars:  No  Hx of skin cancer:No  History of keloids?:No  History of hypertrophic scars?:No  Other:  No  Does patient have acne concerns: No    Other notes:     Past Medical History:   Patient Active Problem List   Diagnosis     Epilepsy      Gender dysphoria in adult - trangender female     Gender dysphoria     No past medical history on file.  No past surgical history on file.    Social History:  Patient reports that she has never smoked. She has never used smokeless tobacco. She reports current alcohol use. She reports that she does not use drugs.    Family History:  Family History   Problem Relation Age of Onset     Breast Cancer Mother      Bleeding Disorder Father        Medications:  Current Outpatient Medications   Medication Sig Dispense Refill     estradiol (ESTRACE) 2 MG tablet Take 2 tablets (4 mg) by mouth daily 180 tablet 0     Lacosamide (VIMPAT) 100 MG TABS tablet Take 1 tablet (100 mg) by mouth 2 times daily 180 tablet 0     spironolactone (ALDACTONE) 100 MG tablet Take 1 tablet (100 mg) by mouth 2 times daily 120 tablet 3        Allergies   Allergen Reactions     Codeine Hives     Nickel Rash       Review of Systems:  -Skin Establ Pt: The patient denies any new rash, pruritus, or lesions that are symptomatic, changing or bleeding, except as per HPI.  -Constitutional: Otherwise feeling well today, in usual state of health.  -HEENT: Patient denies nonhealing oral sores.  -Skin: As above in HPI. No additional skin concerns.    Physical exam:  Vitals: There were no vitals taken for this visit.  GEN: This is a well developed, well-nourished female in no acute distress, in a pleasant mood.    SKIN: Focused examination of the face, neck, chest, abdomen, back, upper extremities, genital area was performed.  - Cheng skin type: II  - Medium to coarse brown terminal hairs on mons pubis, penile shaft, and scrotum / perineum.   - Pink papules, some crusted with overlying excoriations, coalescing into plaques on the bilateral abdomen in periumbilical area, flanks, lower back, ventral forearms.   - No other lesions of concern on areas examined.     Impression/Plan:    1. Gender dysphoria, pending vaginoplasty, plan for laser hair removal which  is medically recommended.     Cheng skin type II. Recommend the 755 laser.     Location: Area planned for treatment and reviewed with patient is for pre-operative preparation for vaginoplasty (penis, scrotum, perineum, inguinal folds). The patient understands removing hair may result in visible scars.     We additionally discussed laser hair removal of the perianal area. Discussed this is considered cosmetic, but can be performed alongside other areas treated. The patient would not like to proceed with laser hair removal in the yareli-anal area.     Reviewed each treatment will target existing hair bulbs, but to treat all hair multiple treatments will be needed. Patient informed laser hair removal is not permenant and hair may regrow years later. Reviewed this will not work for blond or white hair. Any blonde or white hair will need electrolysis. Reviewed that home laser units may not be as efficacious.     Shaving hair prior to LHR will improve efficacy. Any plucking, waxing, electrolysis, or needs to be avoided at least 4 weeks before LHR. Adhere to strict sun avoidance for a minimum of 6 weeks before and after each treatment.     We reviewed with the patient that even with laser hair removal there is still risk for hair growth within the neovaginal cavity for vaginoplasty and is medically recommended.     After treatment we recommend the patient wait 3 months after the last planned hair removal treatment before proceeding with surgery, in and effort to confirm that no further hair regrowth will occur.    Electrolysis was offered as an alternative, especially for light hairs that may not response to laser hair removal. We would need to refer outside our sytem.     Hand-out provided on laser hair removal in preparation for vaginoplasty, including insurance approval process and contact information in the case of questions or concerns.     Patient was confirmed to not be using topical minoxidil in treatment  areas.     We will initiate a letter to insurance to check for insurance coverage by messaging the [employer] derm pool.     Other notes for laser hair removal team:  None.       Notes from laser intake nursing checklist: None.       The patient will send a [employer] message in 2 weeks to check status of coverage.       The patient  would not like a total skin exam scheduled.     2. Dermatitis, abdomen. Differential includes contact dermatitis (given possible prior known nickel allergy), psoriasis (given yareli-umbilical involvement), dermatitis herpetiformis (given distribution and morphology on exam) versus Darier's disease. Will start topical steroids as below with close follow-up to consider biopsy.       Start triamcinolone 0.1% ointment BID x 4 weeks    If not improved, low threshold for biopsy at follow-up    CC Dr. Butler on close of this encounter.  Follow-up in 4 weeks, earlier for new or changing lesions.     Staff Involved:  Staff only    Epifanio Biggs MD    Department of Dermatology  [facility]: Phone: [phone], Fax:[phone]  [facility]: Phone: [phone], Fax: [phone]

## 2019-11-12 NOTE — LETTER
"    11/12/2019         RE: Kun Florian  [address] [facility] [address]        Dear Colleague,    Thank you for referring your patient, Kun Florian, to the [facility]. Please see a copy of my visit note below.    [facility] Dermatology Note      Dermatology Problem List:  1. Gender dysphoria, pending vaginoplasty  - R PA letter prepared 11/12/19  2. Dermatitis, abdomen, upper extremities back. Ddx contact dermatitis, psoriasis, considered dermatitis herpetiformis, Darier's disease.   - triamcinolone 0.1% ointment BID  - low threshold for biopsy at follow-up    Encounter Date: Nov 12, 2019    CC:   Chief Complaint   Patient presents with     Laser Consultation     Kun is visiting to discuss [facility]     SOGI Data:  Sex Assigned at Birth: Male  Gender Identity: Transgender Female / Male-to-Female  Pronouns: She/her/hers  Sexual Orientation: Bisexual    History of Present Illness:  Ms. Kun Florian is a 24 year old adult who presents for evaluation of laser hair removal as part of pre-operative preparation for vaginoplasty. The patient was referred by Dr. Butler (Plastic Surgery). She has socially transitioned and been maintained on a feminizing hormone therapy consistent of estradiol and spirolactone. She has prior had laser hair removal on the face at an outside provider. She is tentatively planning for vaginoplasty in the next year, pending laser hair removal. Has never had electrolysis or laser hair removal to the genital area, though has read extensively about this online.    Of note, patient also reports about 1 year history of an itchy rash on the abdomen and back, occasionally extremities. She states this was previously diagnosed as a jaylene allergy and she has been prescribed a medication that came in a \"pink tube\". She is currently not using anything on the other. No personal or family history of eczema. No history of psoriasis. No personal or family history of celiac " disease.    Health otherwise stable. No other skin concerns.     Dermatology Safety Transgender Patient Intake:  Is the patient pending upcoming gender affirmation surgery: Yes  IF yes, what type of surgery is the upcoming expected affirmation surgery(phalloplasty/vaginoplasty/facial surgery/NA)? Vaginoplasty  Has the patient met with plastic surgeon prior to this visit: Yes  What is the goal date of the upcoming expected affirmation surgery (date/ NA)? Pending hair removal, potentially August 2020.   Is the patient on hormonal therapy(if yes list what type (estrogen/cris/estradiol or testosoterone) and length of time treated):  Yes; estradiol and sprionolactone; been on therapy for > 2 years    Is the patient here for laser hair removal assessment:  Yes  Has the patient had laser hair removal in the past?  Yes, on the face  If, so, how many approximate weeks/months/years prior: 1 year ago  Was insurance coverage pursued for laser hair removal procedure (Yes/no/pending)?  No  Was insurance coverage obtained (yes/no/pending)?  No    Has the patient ever had any other minimally invasive cosmetic procedures such as the following:  Electrolysis:  No  Botulinum toxin:  No  Fillers (list filler type and location):  No  Chemical peels: No  Lasers: No  Intense pulsed light: No  Broad band light: No  Microneedling: No  Has the patient every had any unknown injectable products or other injectable products? (If yes, please list details if available):  If any minimally invasive  cosmetic procedures performed prior to presentation to our clinic were they performed by a bright COOK/ NP/RN/ other or masoudn: N/a    Has the patient had any prior surgeries as follows:  Breast augmentation:  No  Breast removal:  No  Genital:  No  Face:  No  Hair transplant:  No    Does the patient have any other concerns regarding the following they would like to address at future appointments:  Skin tone/texture:  No  Rhytides:  No  Scars:  No  Hx  of skin cancer:No  History of keloids?:No  History of hypertrophic scars?:No  Other:  No  Does patient have acne concerns: No    Other notes:     Past Medical History:   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     Gender dysphoria     No past medical history on file.  No past surgical history on file.    Social History:  Patient reports that she has never smoked. She has never used smokeless tobacco. She reports current alcohol use. She reports that she does not use drugs.    Family History:  Family History   Problem Relation Age of Onset     Breast Cancer Mother      Bleeding Disorder Father        Medications:  Current Outpatient Medications   Medication Sig Dispense Refill     estradiol (ESTRACE) 2 MG tablet Take 2 tablets (4 mg) by mouth daily 180 tablet 0     Lacosamide (VIMPAT) 100 MG TABS tablet Take 1 tablet (100 mg) by mouth 2 times daily 180 tablet 0     spironolactone (ALDACTONE) 100 MG tablet Take 1 tablet (100 mg) by mouth 2 times daily 120 tablet 3        Allergies   Allergen Reactions     Codeine Hives     Nickel Rash       Review of Systems:  -Skin Establ Pt: The patient denies any new rash, pruritus, or lesions that are symptomatic, changing or bleeding, except as per HPI.  -Constitutional: Otherwise feeling well today, in usual state of health.  -HEENT: Patient denies nonhealing oral sores.  -Skin: As above in HPI. No additional skin concerns.    Physical exam:  Vitals: There were no vitals taken for this visit.  GEN: This is a well developed, well-nourished female in no acute distress, in a pleasant mood.    SKIN: Focused examination of the face, neck, chest, abdomen, back, upper extremities, genital area was performed.  - Cheng skin type: II  - Medium to coarse brown terminal hairs on mons pubis, penile shaft, and scrotum / perineum.   - Pink papules, some crusted with overlying excoriations, coalescing into plaques on the  bilateral abdomen in periumbilical area, flanks, lower back, ventral forearms.   - No other lesions of concern on areas examined.     Impression/Plan:    1. Gender dysphoria, pending vaginoplasty, plan for laser hair removal which is medically recommended.     Cheng skin type II. Recommend the 755 laser.     Location: Area planned for treatment and reviewed with patient is for pre-operative preparation for vaginoplasty (penis, scrotum, perineum, inguinal folds). The patient understands removing hair may result in visible scars.     We additionally discussed laser hair removal of the perianal area. Discussed this is considered cosmetic, but can be performed alongside other areas treated. The patient would not like to proceed with laser hair removal in the yareli-anal area.     Reviewed each treatment will target existing hair bulbs, but to treat all hair multiple treatments will be needed. Patient informed laser hair removal is not permenant and hair may regrow years later. Reviewed this will not work for blond or white hair. Any blonde or white hair will need electrolysis. Reviewed that home laser units may not be as efficacious.     Shaving hair prior to LHR will improve efficacy. Any plucking, waxing, electrolysis, or needs to be avoided at least 4 weeks before LHR. Adhere to strict sun avoidance for a minimum of 6 weeks before and after each treatment.     We reviewed with the patient that even with laser hair removal there is still risk for hair growth within the neovaginal cavity for vaginoplasty and is medically recommended.     After treatment we recommend the patient wait 3 months after the last planned hair removal treatment before proceeding with surgery, in and effort to confirm that no further hair regrowth will occur.    Electrolysis was offered as an alternative, especially for light hairs that may not response to laser hair removal. We would need to refer outside our sytem.     Hand-out provided  on laser hair removal in preparation for vaginoplasty, including insurance approval process and contact information in the case of questions or concerns.     Patient was confirmed to not be using topical minoxidil in treatment areas.     We will initiate a letter to insurance to check for insurance coverage by messaging the [employer] derm pool.     Other notes for laser hair removal team:  None.       Notes from laser intake nursing checklist: None.       The patient will send a [employer] message in 2 weeks to check status of coverage.       The patient  would not like a total skin exam scheduled.     2. Dermatitis, abdomen. Differential includes contact dermatitis (given possible prior known nickel allergy), psoriasis (given yareli-umbilical involvement), dermatitis herpetiformis (given distribution and morphology on exam) versus Darier's disease. Will start topical steroids as below with close follow-up to consider biopsy.       Start triamcinolone 0.1% ointment BID x 4 weeks    If not improved, low threshold for biopsy at follow-up    CC Dr. Butler on close of this encounter.  Follow-up in 4 weeks, earlier for new or changing lesions.     Staff Involved:  Staff only    Epifanio Biggs MD    Department of Dermatology  [facility]: Phone: [phone], Fax:[phone]  [facility]: Phone: [phone], Fax: [phone]        Again, thank you for allowing me to participate in the care of your patient.        Sincerely,        Epifanio Biggs MD

## 2019-11-12 NOTE — NURSING NOTE
Kun Florian's goals for this visit include:   Chief Complaint   Patient presents with     Laser Consultation     Kun is visiting to discuss LHR     She requests these members of her care team be copied on today's visit information:     PCP: Josie Macias    Referring Provider:  No referring provider defined for this encounter.    There were no vitals taken for this visit.    Do you need any medication refills at today's visit? No    Etta Lombardi LPN

## 2019-11-12 NOTE — PATIENT INSTRUCTIONS
Laser Hair Removal for Vaginoplasty    ? What is it?  Laser hair removal is a medical treatment that uses light to reduce hair. It works by targeting the  pigment (melanin) in darker hair.    ? Is it permanent?  No. Laser hair removal may not be permanent. Hair may regrow years later, but it tends to be  less, thinner, and paler than before treatment.    ? Why do I need to have this before my vaginoplasty?  Laser hair removal is medically recommended for vaginoplasties to prevent hair growth inside  the vagina after surgery.    ? What areas on my body will be treated?  We will remove hair in your pubic area, including hair over the penis, perineum and scrotum.  Please let your nurse and doctor know if you would like hair removed on the pubic bone or groin  creases. You will have scars after your surgery that may be visible in the treatment area.  Note: Removing hair around the anus (perianal) hair is for looks only (cosmetic) and costs  Extra.    ? Does it work on blonde or white hair?  No. Laser hair removal will not work for blonde or white hair. You will need electrolysis (a  treatment that uses radio waves to stop hair growth) for any blonde or white hair.    ? How many treatments do I need?  We suggest 6 to 12 treatments (performed every 3-6 weeks) before surgery. We also suggest  waiting 3 months after the last treatment to make sure you do not need further treatments.    ? What are the risks?  I will have redness, pain and swelling. I may have skin discoloration, bruising and itching. Risks  are permanent discoloration, hives, infection, scarring, eye injury, hair growth, and blisters. The  outcome could be no improvement or slight improvement. Multiple treatments are required. All  hair will not be removed.    ? Will my insurance pay for this?  We will work with you to try to get insurance coverage. After your visit, we will send a letter to  your insurance through our financial counselors to check coverage.  If you do NOT hear from us  in two weeks, please contact us via [employer] or telephone. For more details on insurance  coverage, see the diagram below.    Insurance Workflow        Contact Information  ? [facility]  [address], [place] [address]  Phone Number: [phone]    ? [facility]  [address], [address], [place] [address]  Phone Number: [phone]    ? Transgender Intake & Referral Coordinator for insurance denial navigation:  Phone Number: [phone]    ? For urgent needs outside of business hours: Call [phone] and ask for the  dermatology resident on call.    ? What should I do BEFORE treatment?    6 weeks before treatment  ? Keep your treatment area out of the sun. Do NOT tan the removal area or come in for  treatment with a tan. Tans increase the risks of treatment. Do NOT use spray tan or any  over-the-counter self-tanners before treatment.  ? Stop waxing, plucking, electrolysis and other laser treatments.  ? Stop any other cosmetic treatments to the area, such as chemical peels or  Dermabrasion.    1 week before treatment  ? Do NOT use retinols, such as adapalene (Differin) and tretinoin (Retin-A), on the  treatment area.  ? Do NOT use any bleaching creams, such as hydroquinone, on the treatment area.    The night before treatment  ? Gently shave the treatment area.

## 2019-11-13 ENCOUNTER — ANESTHESIA EVENT (OUTPATIENT)
Dept: SURGERY | Facility: CLINIC | Age: 24
End: 2019-11-13

## 2019-11-13 ENCOUNTER — OFFICE VISIT (OUTPATIENT)
Dept: SURGERY | Facility: CLINIC | Age: 24
End: 2019-11-13
Payer: COMMERCIAL

## 2019-11-13 ENCOUNTER — TELEPHONE (OUTPATIENT)
Dept: DERMATOLOGY | Facility: CLINIC | Age: 24
End: 2019-11-13

## 2019-11-13 VITALS
DIASTOLIC BLOOD PRESSURE: 73 MMHG | OXYGEN SATURATION: 96 % | BODY MASS INDEX: 27.67 KG/M2 | RESPIRATION RATE: 19 BRPM | WEIGHT: 176.3 LBS | SYSTOLIC BLOOD PRESSURE: 114 MMHG | HEART RATE: 92 BPM | TEMPERATURE: 97 F | HEIGHT: 67 IN

## 2019-11-13 DIAGNOSIS — Z01.818 PREOP EXAMINATION: Primary | ICD-10-CM

## 2019-11-13 ASSESSMENT — ENCOUNTER SYMPTOMS: SEIZURES: 1

## 2019-11-13 ASSESSMENT — MIFFLIN-ST. JEOR: SCORE: 1582.32

## 2019-11-13 ASSESSMENT — PAIN SCALES - GENERAL: PAINLEVEL: NO PAIN (0)

## 2019-11-13 NOTE — ANESTHESIA PREPROCEDURE EVALUATION
Anesthesia Pre-Procedure Evaluation    Patient: Kun Florian   MRN:     [id number] Gender:   adult   Age:    24 year old :      1995        Preoperative Diagnosis: * No surgery found *        No past medical history on file.   No past surgical history on file.       Anesthesia Evaluation     . Pt has had prior anesthetic.     No history of anesthetic complications          ROS/MED HX    ENT/Pulmonary:      (-) asthma and sleep apnea   Neurologic:     (+)seizures last seizure: 2019 features: grand mal per patient,     Cardiovascular:  - neg cardiovascular ROS   (+) ----. : . . . :. . No previous cardiac testing       METS/Exercise Tolerance:  >4 METS   Hematologic:  - neg hematologic  ROS       Musculoskeletal:         GI/Hepatic:  - neg GI/hepatic ROS       Renal/Genitourinary:  - ROS Renal section negative       Endo:  - neg endo ROS       Psychiatric:  - neg psychiatric ROS   (+) psychiatric history anxiety and depression      Infectious Disease:  - neg infectious disease ROS       Malignancy:      - no malignancy   Other:    (+) no H/O Chronic Pain,                       PHYSICAL EXAM:   Mental Status/Neuro: A/A/O; Age Appropriate   Airway: Facies: Feasible  Mallampati: II  Mouth/Opening: Full  TM distance: > 6 cm  Neck ROM: Full   Respiratory: Auscultation: CTAB     Resp. Rate: Normal     Resp. Effort: Normal      CV: Rhythm: Regular  Rate: Age appropriate  Heart: Normal Sounds  Edema: None   Comments:      Dental: Normal Dentition                LABS:  CBC:   Lab Results   Component Value Date    HGB 12.9 2019     BMP:   Lab Results   Component Value Date    .5 2019    POTASSIUM 3.7 2019    CHLORIDE 100.0 2019    CO2 27.3 2019    BUN 12.8 2019    CR 0.8 2019    .5 (H) 2019     COAGS: No results found for: PTT, INR, FIBR  POC: No results found for: BGM, HCG, HCGS  OTHER:   Lab Results   Component Value Date    SHAWN 9.3 2019    PROTTOTAL  "6.9 08/21/2019    ALT 11.4 08/21/2019    AST 15.0 08/21/2019    ALKPHOS 26.8 (L) 08/21/2019    BILITOTAL <0.4 08/21/2019        Preop Vitals    BP Readings from Last 3 Encounters:   10/09/19 116/69   08/21/19 115/73   08/13/19 123/70    Pulse Readings from Last 3 Encounters:   10/09/19 85   08/21/19 75   08/13/19 76      Resp Readings from Last 3 Encounters:   08/21/19 16   05/31/19 16   03/13/19 20    SpO2 Readings from Last 3 Encounters:   08/21/19 99%   08/13/19 100%   05/31/19 99%      Temp Readings from Last 1 Encounters:   08/21/19 98  F (36.7  C) (Oral)    Ht Readings from Last 1 Encounters:   10/09/19 1.715 m (5' 7.5\")      Wt Readings from Last 1 Encounters:   10/09/19 74.8 kg (165 lb)    Estimated body mass index is 25.46 kg/m  as calculated from the following:    Height as of 10/9/19: 1.715 m (5' 7.5\").    Weight as of 10/9/19: 74.8 kg (165 lb).     LDA:        AJ FV AN PLAN NO PONV RULE       PAC Discussion and Assessment    ASA Classification: 2  Case is suitable for: ASC  Anesthetic techniques and relevant risks discussed: GA  Invasive monitoring and risk discussed: No  Types:   Possibility and Risk of blood transfusion discussed: No  NPO instructions given:   Additional anesthetic preparation and risks discussed:   Needs early admission to pre-op area:   Other:     PAC Resident/NP Anesthesia Assessment:  Kun Florian is a 24 year old female (male to female transgender) scheduled for bilateral simple scrotal orchiectomy on 11/22/2019 by Dr. Morales in treatment of gender dysphoria.  PAC referral for risk assessment and optimization for anesthesia with comorbid conditions of epilepsy, dermatitis:    Pre-operative considerations:  1.  Cardiac:  Functional status- METS >4.  Low risk surgery with 0.4% (RCRI 0) risk of major adverse cardiac event.   -denies CP, SOB, GARCIA, palpitations  -denies cardiac history    2.  Pulm:  Airway feasible.  YUNI risk: low  -never smoker    3.  GI:  Risk of PONV score = 2.  " If > 2, anti-emetic intervention recommended.    4.  Neuro:  History of epilepsy.  Last seizure 6/2019.  Pt maintained on lacosamide 100mg bid.  Will take DOS.    5.  Integumentary:  Pt with current dermatitis.  Derm is following.  Started Triamcinolone 11/12/19 with plan to follow up and possible biopsy.  Per derm note : Ddx contact dermatitis, psoriasis, considered dermatitis herpetiformis, Darier's disease.     6.  Other:  Gender dysphoria.  Male to female transition.  Procedure as above, continue estradiol and spironolactone.  Will take DOS.    VTE risk: 0.5%         **For further details of assessment, testing, and physical exam please see H and P completed on same date.          Daja Cutler PA-C, [facility]      Reviewed and Signed by PAC Mid-Level Provider/Resident  Mid-Level Provider/Resident: Daja Cutler  Date: 11/13/2019  Time:     Attending Anesthesiologist Anesthesia Assessment:        Anesthesiologist:   Date:   Time:   Pass/Fail:   Disposition:     PAC Pharmacist Assessment:        Pharmacist:   Date:   Time:    Daja Cutler PA-C

## 2019-11-13 NOTE — LETTER
November 13, 2019      RE: Kun Florian  [address] [facility] [address]        To Whom This May Concern,     We are writing to request coverage of laser hair removal as part of preoperative preparation for vaginoplasty for the treatment of gender dysphoria. Laser hair removal is medically necessary for this indication. Treatment areas will include the inguinal creases, penile shaft, scrotum, and perineum.     We would expect 1 treatment every 3-8 weeks for a maximum of 12 treatments. The cost is $275 per treatment.     The CPT Code Description utilized would be 59709: Unlisted procedure, skin, mucous membrane and subcutaneous tissue.       Kun Florian is a patient of mine who was assigned a sex of male at birth with a female gender identity. Kun has socially transitioned and has been maintained on a feminizing hormone therapy regimen consisting of estradiol and spironolactone. The patient currently meets World [employer] Association for Transgender Health (WPATH) Standards of Care Criteria (version 7) for vaginoplasty as the patient has:     (1) Persistent, well-documented gender dysphoria;   (2) Capacity to make a fully informed decision and to consent for treatment;   (3) 12 continuous months of hormone therapy as appropriate to the patient's gender goals; and   (4) 12 continuous months of living in a gender role that is congruent with their gender identity.     Laser hair removal of the donor tissue sites prior to vaginoplasty is medically necessary. A portion of the donor site tissues will be used to construct the neovaginal cavity and must be rendered free of hair growth in order to avoid hair-related post-surgical complications. These include trapping of moisture, soap, skin secretions, ejaculate, or lubricant (used for routine neovaginal dilation) in hairs that then serve as a nidus for potentially serious infections. Furthermore, severe pruritus and/or discomfort may occur among patients with hair  in the neovaginal cavity. Options for post-operative hair removal in the neovaginal cavity after vaginoplasty are limited and unsafe, as the neovaginal cavity is not accessible for laser hair removal or electrolysis. Hair-related post-surgical complications subsequently may require surgical removal that can be costly, unsafe and expose the patient to unnecessary risks. Thus, it is medically necessary that hair is removed through laser hair removal from the donor site tissues prior to vaginoplasty to avoid these complications.     We strive to provide our patient with outstanding care. Therefore, I request you please contact me at [phone] if you have any questions regarding coverage or our treatment rationale.     Epifanio Biggs MD      Department of Dermatology   [facility]: Phone: [phone], Fax:[phone]   [facility]: Phone: [phone], Fax: [phone]

## 2019-11-13 NOTE — H&P
Pre-Operative H & P     CC:  Preoperative exam to assess for increased cardiopulmonary risk while undergoing surgery and anesthesia.    Date of Encounter: 11/13/2019  Primary Care Physician:  Josie Macias  Associated Diagnosis:  Gender dysphoria    HPI  Kun Florian is a 24 year old adult who presents for pre-operative H & P in preparation for bilateral simple scrotal orchiectomy with Dr. Morales on 11/22/2019 at [facility]. General Anesthesia.    Patient is a 24-year-old transgender female (male to female transition).  She has been living as a female for over 2 years.  Her preferred pronouns are she and her.  She is taking spironolactone and estradiol.  She has met with both Dr. Butler and Dr. Morales.  She was initially considering doing vaginoplasty and bilateral orchiectomy simultaneously but has now decided that she will have a orchiectomy prior to the vaginoplasty.    Her past medical history is significant for epilepsy for which she takes lacosamide, and recent dermatitis.  She is being followed by dermatology and has started triamcinolone cream for an erythematous maculopapular rash present on her abdomen and flanks as well as her antecubital fossa on the left side.  She says that it is pruritic but the triamcinolone is helping and she has trimmed her nails down to avoid scratching.    History is obtained from the patient.     Past Medical History  Past Medical History:   Diagnosis Date     Epilepsy (H)        Past Surgical History  Past Surgical History:   Procedure Laterality Date     C EACH ADD TOOTH EXTRACTION       TONSILLECTOMY      10 years old       Hx of Blood transfusions/reactions: none     Hx of abnormal bleeding or anti-platelet use: none    Menstrual history: No LMP recorded.:     Steroid use in the last year: none    Personal or FH with difficulty with Anesthesia:  none    Prior to Admission Medications  Current Outpatient Medications   Medication Sig Dispense  Refill     estradiol (ESTRACE) 2 MG tablet Take 2 tablets (4 mg) by mouth daily (Patient taking differently: Take 4 mg by mouth 2 times daily ) 180 tablet 0     Lacosamide (VIMPAT) 100 MG TABS tablet Take 1 tablet (100 mg) by mouth 2 times daily 180 tablet 0     spironolactone (ALDACTONE) 100 MG tablet Take 1 tablet (100 mg) by mouth 2 times daily 120 tablet 3     triamcinolone (KENALOG) 0.1 % external ointment Apply twice daily to rash on abdomen 454 g 3       Allergies  Allergies   Allergen Reactions     Codeine Hives     Difficulty breathing, throat swelling..Happen approximately 14 years ago      Nickel Rash       Social History  Social History     Socioeconomic History     Marital status: Single     Spouse name: Not on file     Number of children: Not on file     Years of education: Not on file     Highest education level: Not on file   Occupational History     Not on file   Social Needs     Financial resource strain: Not on file     Food insecurity:     Worry: Not on file     Inability: Not on file     Transportation needs:     Medical: Not on file     Non-medical: Not on file   Tobacco Use     Smoking status: Never Smoker     Smokeless tobacco: Never Used   Substance and Sexual Activity     Alcohol use: Yes     Comment: occasional, socially     Drug use: No     Sexual activity: Yes     Partners: Female, Male   Lifestyle     Physical activity:     Days per week: Not on file     Minutes per session: Not on file     Stress: Not on file   Relationships     Social connections:     Talks on phone: Not on file     Gets together: Not on file     Attends [employer] service: Not on file     Active member of club or organization: Not on file     Attends meetings of clubs or organizations: Not on file     Relationship status: Not on file     Intimate partner violence:     Fear of current or ex partner: Not on file     Emotionally abused: Not on file     Physically abused: Not on file     Forced sexual activity: Not on  "file   Other Topics Concern     Not on file   Social History Narrative     Not on file       Family History  Family History   Problem Relation Age of Onset     Breast Cancer Mother      Bleeding Disorder Father        Anesthesia Evaluation     . Pt has had prior anesthetic.     No history of anesthetic complications    ROS/MED HX  The complete review of systems is negative other than noted in the HPI or here.   ENT/Pulmonary:      (-) asthma and sleep apnea   Neurologic:     (+)seizures last seizure: 6/2019 features: grand mal per patient,     Cardiovascular:  - neg cardiovascular ROS   (+) ----. : . . . :. . No previous cardiac testing       METS/Exercise Tolerance:  >4 METS   Hematologic:  - neg hematologic  ROS       Musculoskeletal:         GI/Hepatic:  - neg GI/hepatic ROS       Renal/Genitourinary:  - ROS Renal section negative       Endo:  - neg endo ROS       Psychiatric:  - neg psychiatric ROS   (+) psychiatric history anxiety and depression      Infectious Disease:  - neg infectious disease ROS       Malignancy:      - no malignancy   Other:    (+) no H/O Chronic Pain,           PHYSICAL EXAM:   Mental Status/Neuro: A/A/O; Age Appropriate   Airway: Facies: Feasible  Mallampati: II  Mouth/Opening: Full  TM distance: > 6 cm  Neck ROM: Full   Respiratory: Auscultation: CTAB     Resp. Rate: Normal     Resp. Effort: Normal      CV: Rhythm: Regular  Rate: Age appropriate  Heart: Normal Sounds  Edema: None   Comments:      Dental: Normal Dentition            Temp: 97  F (36.1  C) Temp src: Oral BP: 114/73 Pulse: 92   Resp: 19 SpO2: 96 %         176 lbs 4.8 oz  5' 7\"   Body mass index is 27.61 kg/m .       Physical Exam  Constitutional: Awake, alert, cooperative, no apparent distress, and appears stated age.  Eyes: Pupils equal, round and reactive to light, extra ocular muscles intact, sclera clear, conjunctiva normal.  HENT: Normocephalic, oral pharynx with moist mucus membranes, good dentition. No goiter " appreciated.   Respiratory: Clear to auscultation bilaterally, no crackles or wheezing.  Cardiovascular: Regular rate and rhythm, normal S1 and S2, and no murmur noted.  Carotids +2, no bruits. No edema. Palpable pulses to radial  DP and PT arteries.   GI: Normal bowel sounds, soft  Lymph/Hematologic: No cervical lymphadenopathy and no supraclavicular lymphadenopathy.  Genitourinary:  deferred  Skin: Warm and dry.  Pink/red papules.  Crusting, plaques.  Left antecubital fossa and ventral abdomen/periumbilical.  Minor excoriations.  Musculoskeletal: Full ROM of neck. There is no redness, warmth, or swelling of the joints. Gross motor strength is normal.    Neurologic: Awake, alert, oriented to name, place and time. Cranial nerves II-XII are grossly intact. Gait is normal.   Neuropsychiatric: Calm, cooperative. Normal affect.     Labs: (personally reviewed) 6/29/19  Sodium 136 - 145 mmol/L 138    Potassium 3.5 - 5.1 mmol/L 3.8    Chloride 98 - 109 mmol/L 104    CO2 20 - 29 mmol/L 23    Anion Gap 7 - 16 mmol/L 11    Calcium 8.4 - 10.4 mg/dL 9.3    BUN 7 - 26 mg/dL 12    Creatinine 0.55 - 1.02 mg/dL 0.96    GFR, Estimated >60 mL/min/1.73m2 >60    GFR, Est If African American >60 mL/min/1.73m2 >60    Glucose 70 - 100 mg/dL 95      WBC 3.5 - 10.5 x10(9)/L 12.2High     RBC 3.90 - 5.03 x10(12)/L 4.61    Hemoglobin 12.0 - 15.5 g/dL 13.3    HCT 34.9 - 44.5 % 40.3    MCV 80.0 - 100.0 fL 87.4    MCH 27.6 - 33.3 pg 28.9    MCHC 31.5 - 35.2 g/dL 33.0    RDW 11.9 - 15.5 % 11.7Low     Platelets 150 - 450 x10(9)/L 252    Automated NRBC <=0 /100 WBC 0    Neutrophil Absolute 1.7 - 7.0 10(9)/L 10.3High     Lymphocyte Absolute 1.0 - 4.8 10(9)/L 1.2    Monocytes Absolute 0.2 - 0.9 10(9)/L 0.6    Eosinophil Absolute 0.1 - 0.5 10(9)/L 0.0Low     Basophil Absolute 0.0 - 0.3 10(9)/L 0.0    Immature Gran % 0.0 - 0.5 % 0.7High       EKG: not indicated for low risk procedure        Outside records reviewed from: care everywhere    ASSESSMENT  and PLAN  Kun Florian is a 24 year old female (male to female transgender) scheduled for bilateral simple scrotal orchiectomy on 11/22/2019 by Dr. Morales in treatment of gender dysphoria.  PAC referral for risk assessment and optimization for anesthesia with comorbid conditions of epilepsy, dermatitis:    Pre-operative considerations:  1.  Cardiac:  Functional status- METS >4.  Low risk surgery with 0.4% (RCRI 0) risk of major adverse cardiac event.   -denies CP, SOB, GARCIA, palpitations  -denies cardiac history    2.  Pulm:  Airway feasible.  YUNI risk: low  -never smoker    3.  GI:  Risk of PONV score = 2.  If > 2, anti-emetic intervention recommended.    4.  Neuro:  History of epilepsy.  Last seizure 6/2019.  Pt maintained on lacosamide 100mg bid.  Will take DOS.    5.  Integumentary:  Pt with current dermatitis.  Derm is following.  Started Triamcinolone 11/12/19 with plan to follow up and possible biopsy.  Per derm note : Ddx contact dermatitis, psoriasis, considered dermatitis herpetiformis, Darier's disease.  Pt states she clipped her nails to avoid scratching.    6.  Other:  Gender dysphoria.  Male to female transition.  Procedure as above, continue estradiol and spironolactone.  Will take DOS.    VTE risk: 0.5%            Daja Cutler PA-C  Preoperative Assessment Center  [facility]  Phone: [phone]  Fax: [phone]

## 2019-11-13 NOTE — TELEPHONE ENCOUNTER
FC - please submit letter dated 11/13/19 from Dr. Biggs to insurance.  See letter tab.    Gilma Sanderson RN

## 2019-11-13 NOTE — PATIENT INSTRUCTIONS
Preparing for Your Surgery      Name:  Kun Florian   MRN:  [id number]   :  1995   Today's Date:  2019     Arriving for surgery:  Surgery date:  19  Arrival time:  09:30 am  Please come to:     [facility]  [address] [place] [id number]     Parking is available in front of the Clinics and Surgery Center building from 5:30AM to 8:00PM.  -  Proceed to the 5th floor to check into the Ambulatory Surgery Center.              >> There will be patient concierges on the 1st and 5th floor, for assistance or an escort, if you would like.              >> Please call [phone] with any questions.    What can I eat or drink?  -  You may have solid food or milk products until 8 hours prior to your surgery.  -  You may have water, apple juice or 7up/Sprite until 2 hours prior to your surgery.    Which medicines can I take?  Stop Aspirin, vitamins and supplements one week prior to surgery.  Hold Ibuprofen for 24 hours and/or Naproxen for 48 hours prior to surgery.   -  Do NOT take these medications in the morning, the day of surgery:  Aldactone if normally taken in the morning.  -  Please take these medications the day of surgery:  Tylenol if needed; take all other scheduled medications normally taken in the morning.    How do I prepare myself?  -  Take two showers: one the night before surgery; and one the morning of surgery.         Use Scrubcare or Hibiclens to wash from neck down, leave soap on your skin for up to one minute.  Do not get soap in your eyes or ears.  You may use your own shampoo and conditioner; no other hair products.   -  Do NOT use lotion, powder, deodorant, or antiperspirant the day of your surgery.  -  Do NOT wear any makeup, fingernail polish or jewelry.  - Do not bring your own medications to the hospital, except for inhalers and eye   drops.  -  Bring your ID and insurance card.    -If you are scheduled to go home the Same Day as surgery  you must have a responsible adult as a  and to stay with you overnight the first 24 hours after surgery.     Questions or Concerns:  -If you are scheduled on the East or West campus and have questions or concerns regarding the day of surgery, please call Preadmission Nursing at [phone].     -If you are scheduled at the Ambulatory Surgery Center and have questions or concerns regarding the day of surgery please call [phone].    -For questions after surgery please call your surgeons office.

## 2019-11-19 ENCOUNTER — MYC REFILL (OUTPATIENT)
Dept: FAMILY MEDICINE | Facility: CLINIC | Age: 24
End: 2019-11-19

## 2019-11-19 DIAGNOSIS — G40.822 NONINTRACTABLE EPILEPTIC SPASMS WITHOUT STATUS EPILEPTICUS (H): ICD-10-CM

## 2019-11-19 NOTE — TELEPHONE ENCOUNTER
Spoke with Michelle NEIL from [facility]. Checked cpt code 58271 and ICD10 F64.9 Gender Dysphoria. No PA Required. Coverage based on medical necessity.     There is a medical coverage policy on the [employer] portal. Click on providers tab, View coverage details, choose coverage policy, enter in WMW for the 3 letter prefix. Click submit. Read the disclaimer and click accept. The medical policy # is [id number].     LHR is only covered and is as medically necessary if all items on this medical policy are met. Please review and reach out to patient.

## 2019-11-19 NOTE — TELEPHONE ENCOUNTER
Description:   For a person to be diagnosed with gender dysphoria, there must be a marked difference between the individual s expressed/experienced gender and the gender others would assign him or her, and it must continue for at least six months. In children, the desire to be of the other gender must be present and verbalized. This condition causes clinically significant distress or impairment in social, occupational, or other important areas of functioning.   Gender dysphoria is manifested in a variety of ways, including strong desires to be treated as the other gender or to be rid of one s sex characteristics, or a strong conviction that one has feelings and reactions typical of the other gender.*   Clinicians and researchers use the Diagnostic and Statistical Manual of Mental Disorders, 5th Edition, (DSM-5 ) to diagnose and classify mental disorders. The American Psychiatric Association (APA) approved DSM-5 in 2013, culminating a 14-year revision process. For more information, go to www.DSM5.org.   DSM-5 Criteria   In the [place] the American Psychiatric Association (APA) permits a diagnosis of gender dysphoria if the diagnostic criteria in the DSM-5 are met. The criteria are:   A. A marked incongruence between one s experienced/expressed gender and assigned gender, of at least six month s duration, as manifested by at least two of the followin. A marked incongruence between one s experienced/expressed gender and primary and/or secondary sex characteristics (or in young adolescents, the anticipated secondary sex characteristics); OR   2. A strong desire to be rid of one s primary and/or secondary sex characteristics because of a marked incongruence with one s experienced/expressed gender (or in young adolescents, a desire to prevent the development of the anticipated secondary sex characteristics; OR   3. A strong desire for the primary and/or secondary sex characteristics of the other gender; OR   4. A  strong desire to be of the other gender (or some alternative gender different from one s assigned gender); OR   5. A strong desire to be treated as the other gender (or some alternative gender different from one s assigned gender); OR   6. A strong conviction that one has the typical feelings and reactions of the other gender (or some alternative gender different from one s assigned gender); AND  B. The condition is associated with clinically significant distress or impairment in social, occupational, or other important areas of functioning.     Policy/  Coverage:   Coverage eligibility of Gender Reassignment Surgery for Gender Dysphoria is a contract-specific benefit issue.   Courtesy review is recommended prior to any gender reassignment surgery.   The following member criteria AND provider documentation criteria must be met:   Member Criteria:   1. The candidate is at least 18 years of age; AND   2. Has been diagnosed with gender dysphoria, including meeting all of the following indications:   a. The desire to live and be accepted as a member of the opposite sex (typically accompanied by the desire to make the physical body as congruent as possible with the identified sex through surgery and hormone treatment), AND   b. The new gender identity has been present for at least 24 months; AND   c. The gender identity disorder is not a symptom of another mental disorder or a chromosomal abnormality; AND   d. The gender identity disorder causes clinical distress or impairment in social, occupational, or other important areas of functioning, AND  3. For those candidates without a medical contraindication, the candidate has undergone a minimum of 12 months of continuous hormonal therapy that is (Note: for those candidates requesting female to male surgery see item 4. below):   a. Recommended by a mental health professional AND   b. Provided under the supervision of a physician; and the supervising physician indicates that  the patient has taken the hormones as directed, AND  4. For candidates requesting female to male surgery only:   a. When the initial requested surgery is solely a mastectomy, the treating physician may indicate that no hormonal treatment (as described in criteria 3. above) is required prior to performance of the mastectomy. In this case, the 12 month requirement for hormonal treatment will be waived only when all other criteria contained in this policy and in the member s health benefit plan are met, AND  5. The candidate has completed a minimum of 12 months of successful continuous full time real-life experience in their new gender, with no returning to their original gender, OR   6. If the candidate does not meet the 12 month time frame criteria as noted in item 5. above, then the treating clinician must submit information indicating why it would be clinically inappropriate to require the candidate to meet these criteria. When submitted, the criteria in item 5. will be waived unless the criteria noted in item 5. above are specified as required in the candidate s health benefit plan.   Provider Documentation Criteria:   At a minimum, at least two (2) treating clinicians must provide the following documentation. The documentation must be provided in letters from the appropriate clinicians and contain the information noted below:   1. The letters must attest to the psychological aspects of the candidate s gender dysphoria.   a. One of the letters must be from a behavioral health professional with a doctoral degree (Ph.D., M.D., Ed.D., D.Sc., D.S.W., or Psy.D) who is capable of adequately evaluating if the candidate has any co-morbid psychiatric conditions.   b. One of the letters must be from the candidate s physician, who has treated the candidate for a minimum of 18 months (Note: if the candidate has not been treated continuously by one clinician for 18 months but has transferred care from one clinician to a second  clinician, then both clinicians must submit documentation and their combined treatment must have been for 18 months). The letter or letters must document the followin. Whether the author of the letter is part of a gender identity disorder treatment team; and   2. The candidate s general identifying characteristics; and   3. The initial and evolving gender, sexual, and other psychiatric diagnoses; and   4. The duration of their professional relationship including the type of psychotherapy or evaluation that the candidate underwent; and   5. The eligibility criteria that have been met by the candidate; and   6. The physician or mental health professional s rationale for surgery; and   7. The degree to which the candidate has followed the treatment and experiential requirements to date and the likelihood of future compliance; and   8. The extent of participation in psychotherapy throughout the 12 month real-life trial, (if such therapy is recommended by a treating medical or behavioral health practitioner) and   9. That during the 12 month, real-life experience (for candidates not meeting the 12 month candidate criteria as noted in 5 and 6, the letter should still comment on the candidates ability to function and experience in the desired gender role), persons other than the treating therapist were aware of the candidate s experience in the desired gender role and could attest to the candidate s ability to function in the new role.   10. That the candidate has, intends to, or is in the process of acquiring a legal ursrtn-pzjmbenn-otjpqrvalik name change and   11. Demonstrable progress on the part of the candidate in consolidating the new gender identity, including improvements in the ability to handle:     Work, family, and interpersonal issues     Behavioral health issues, should they exist. This implies satisfactory control of issues such as   o Sociopathy   o Substance abuse   o Psychosis   o Suicidality  c.  If the letters specified in 1a and 1b above come from the same clinician, then a letter from a second physician or behavioral health provider familiar with the candidate corroborating the information provided by the first clinician is required.   d. A letter of documentation must be received from the treating surgeon. If one of the previously described letters is from the treating surgeon then it must contain the documentation noted in the section below. All letters from a treating surgeon must confirm that:   1. The candidate meets the  candidate criteria  listed in this policy, AND   2. The treating surgeon feels that the candidate is likely to benefit from surgery, AND   3. The surgeon has personally communicated with the treating mental health provider or physician treating the candidate, AND that   4. The surgeon has personally communicated with the candidate and that the candidate understands the ramifications or surgery, including:     The required length of hospitalizations,     Possible complications of the surgery, and     The post surgical rehabilitation requirements of the various surgical approaches and the planned surgery.   When benefits are available, the following gender reassignment surgeries---alone or in combination-- are covered:   Hysterectomy, salpingo-oophorectomy, ovariectomy, orchiectomy, metoidioplasty, mastectomy (only for female-to-male gender transition), phalloplasty, vaginoplasty, penectomy, clitoroplasty, labiaplasty, vaginectomy, scrotoplasty, urethroplasty, or placement of testicular prostheses.   Transgender Surgery Exclusions:   The following procedures are considered cosmetic when used to improve the gender specific appearance of an individual who has undergone or is planning to undergo gender reassignment surgery, including, but not limited to, the followin. Abdominoplasty  2. Blepharoplasty  3. Breast augmentation  4. Brow lift  5. Calf implants  6. Electrolysis  7. Face  lift  8. Facial bone reconstruction  9. Facial implants  10. Gluteal augmentation  11. Hair removal/hairplasty, when the criteria above have not been met  12. Jaw reduction (jaw contouring)  13. Lip reduction/enhancement  14. Lipofilling/collagen injections  15. Liposuction  16. Nose implants  17. Pectoral implants  18. Rhinoplasty  19. Thyroid cartilage reduction (chondroplasty)  20. Voice modification surgery  21. Voice therapy   Other surgery exclusions include but are not limited to:   1. Autologous tissue flap breast reconstructions   2. Any services performed to reverse gender reassignment surgery.  3. Gender reassignment surgical procedures not addressed as covered above and/or gender reassignment surgical procedures billed with codes not listed in this medical coverage policy.     Rationale:   Gender reassignment surgery presents significant medical and psychological risks, and the results are irreversible. A step-wise approach to therapy for gender dysphoria, including accurate diagnosis and long-term treatment by a multidisciplinary team including behavioral, medical and surgical specialists, has been shown to provide the best results. As with any treatment involving psychiatric disorders, a thorough behavioral analysis by a qualified practitioner is needed. Once a diagnosis of gender dysphoria is established, treatment with hormone therapy and establishment of real-life transgender experience may be warranted. Gender reassignment surgery should be considered only after such trials have been undertaken, evaluated and confirmed. Hormone therapy, when indicated, should be administered under ongoing medical supervision and is important in beginning the gender transition process by altering body hair, breast size, skin appearance and texture, body fat distribution, and the size and function of sex organs. Hormone therapy is consistent with the development of secondary sexual characteristics vital to gender  transition, and should be administered unless contraindicated. Additionally, real-life experience living as the desired gender is important to validate the individual's desire and ability to incorporate into their desired gender role within their social network and daily environment. This generally involves gender-specific appearance (garments, hairstyle, etc.), involvement in various activities in the desired gender role including work or academic settings, legal acquisition of a gender appropriate first name, and acknowledgement by others of their new gender role. With regard real-life experience, to the 2012 WPATH document specifically states:   The criterion noted above for some types of genital surgeries - i.e., that patients engage in 12 continuous months of living in a gender role that is congruent with their gender identity - is based on expert clinical consensus that this experience provides ample opportunity for patients to experience and socially adjust in their desired gender role, before undergoing irreversible surgery. As noted in section VII, the social aspects of changing one's gender role are usually challenging - often more so than the physical aspects. Changing gender role can have profound personal and social consequences, and the decision to do so should include an awareness of what the familial, interpersonal, educational, vocational, economic, and legal challenges are likely to be, so that people can function successfully in their gender role. Support from a qualified mental health professional and from peers can be invaluable in ensuring a successful gender role adaptation (Lee, 2008).   The duration of 12 months allows for a range of different life experiences and events that may occur throughout the year (e.g., family events, holidays, vacations, season-specific work or school experiences). During this time, patients should present consistently, on a day-to-day basis and across all  settings of life, in their desired gender role. This includes coming out to partners, family, friends, and community members (e.g., at school, work, other settings).   Health professionals should clearly document a patient's experience in the gender role in the medical chart, including the start date of living full time for those who are preparing for genital surgery. In some situations, if needed, health professionals may request verification that this criterion has been fulfilled: They may communicate with individuals who have related to the patient in an identity-congruent gender role, or request documentation of a legal name and/or gender marker change, if applicable.   Once these treatment steps have been established, and have been stable for at least 12 months, an individual may be considered for gender reassignment surgery.   In many instances, the creation of a neovagina or a urethra for a neopenis requires an autologous skin graft from the forearm or thigh. Such skin may be excessively hairy, which will impair the function of the newly constructed organ if not permanently removed. Pre-operative permanent hair removal treatments to these areas may be warranted to prevent post-operative complications.   For both transmen and transwomen, additional surgeries have been proposed to improve the gender appropriate appearance of the individual. Procedures such as breast augmentation, liposuction, Bertin's apple reduction, rhinoplasty, facial reconstruction, and others have no medically necessary role in gender identification and are considered cosmetic in nature.   There is insufficient evidence to prove the efficacy of gender reassignment surgery for specific subgroups of persons selected for such intervention. The subgroups of transsexual people who will most likely benefit from sex reassignment surgery are not clearly identifiable from the published evidence. The evidence is based on a small number of studies with  weak study designs and significant methodological limitations.

## 2019-11-19 NOTE — TELEPHONE ENCOUNTER
Received refill request for Lacosamide (VIMPAT) 100 MG TABS tablet, Rx-scrip[t failed clinic refill protocol, requires outside medication reconciliation process. Request routed to PCP to address/advise if appropriate.    Gonzalo Ortega RN

## 2019-11-20 RX ORDER — LACOSAMIDE 100 MG/1
100 TABLET ORAL 2 TIMES DAILY
Qty: 180 TABLET | Refills: 0 | Status: SHIPPED | OUTPATIENT
Start: 2019-11-20 | End: 2020-09-01

## 2019-11-20 RX ORDER — LACOSAMIDE 100 MG/1
100 TABLET ORAL 2 TIMES DAILY
Qty: 180 TABLET | Refills: 0 | Status: SHIPPED | OUTPATIENT
Start: 2019-11-20 | End: 2019-11-20

## 2019-11-20 NOTE — TELEPHONE ENCOUNTER
Rx-script for Lacosamide (VIMPAT) 100 MG TABS, approved by PCP/authorized by attending preceptor faxed to preferred pharmacy on file, patient notified.    Gonzalo Ortega RN

## 2019-11-20 NOTE — TELEPHONE ENCOUNTER
I spoke with Kun and scheduled her for 1/3/20 at 10:15.  She would like numbing cream applied.    Treatment area(s): vaginoplasty  Dates of coverage: based on medical necessity  Treatments covered: based on medical necessity. Have pt signed waiver.    Prep instructions:  1. Shave night before or morning of procedure.  2. Avoid plucking, waxing, or electrolysis 4 weeks before.  3. Strict sun avoidance 6 weeks prior to and 6 weeks after treatment.    Gilma Sanderson RN

## 2019-11-20 NOTE — TELEPHONE ENCOUNTER
Appears to be covered by me. They are just using the WPATH criteria. Looks like they may not cover facial hair removal - but this would just be genital areas for vaginoplasty.     OK to schedule. We'll just have to have her sign a waiver in case insurance denies once we submit after the 1st procedure.     Epifanio Biggs MD

## 2019-11-21 ENCOUNTER — ANESTHESIA EVENT (OUTPATIENT)
Dept: SURGERY | Facility: AMBULATORY SURGERY CENTER | Age: 24
End: 2019-11-21

## 2019-11-21 ASSESSMENT — ENCOUNTER SYMPTOMS: SEIZURES: 1

## 2019-11-21 NOTE — ANESTHESIA PREPROCEDURE EVALUATION
Anesthesia Pre-Procedure Evaluation    Patient: Kun Florian   MRN:     [id number] Gender:   adult   Age:    24 year old :      1995        Preoperative Diagnosis: * No surgery found *        Past Medical History:   Diagnosis Date     Epilepsy (H)       Past Surgical History:   Procedure Laterality Date     C EACH ADD TOOTH EXTRACTION       TONSILLECTOMY      10 years old          Anesthesia Evaluation     . Pt has had prior anesthetic.     No history of anesthetic complications          ROS/MED HX    ENT/Pulmonary:      (-) asthma and sleep apnea   Neurologic:     (+)seizures last seizure: 2019 features: grand mal per patient,     Cardiovascular:  - neg cardiovascular ROS   (+) ----. : . . . :. . No previous cardiac testing       METS/Exercise Tolerance:  >4 METS   Hematologic:  - neg hematologic  ROS       Musculoskeletal:         GI/Hepatic:  - neg GI/hepatic ROS       Renal/Genitourinary:  - ROS Renal section negative       Endo:  - neg endo ROS       Psychiatric:  - neg psychiatric ROS   (+) psychiatric history anxiety and depression      Infectious Disease:  - neg infectious disease ROS       Malignancy:      - no malignancy   Other:    (+) no H/O Chronic Pain,                       PHYSICAL EXAM:   Mental Status/Neuro: A/A/O; Age Appropriate   Airway: Facies: Feasible  Mallampati: II  Mouth/Opening: Full  TM distance: > 6 cm  Neck ROM: Full   Respiratory: Auscultation: CTAB     Resp. Rate: Normal     Resp. Effort: Normal      CV: Rhythm: Regular  Rate: Age appropriate  Heart: Normal Sounds  Edema: None   Comments:      Dental: Normal Dentition                LABS:  CBC:   Lab Results   Component Value Date    HGB 12.9 2019     BMP:   Lab Results   Component Value Date    .5 2019    POTASSIUM 3.7 2019    CHLORIDE 100.0 2019    CO2 27.3 2019    BUN 12.8 2019    CR 0.8 2019    .5 (H) 2019     COAGS: No results found for: PTT, INR, FIBR  POC:  "No results found for: BGM, HCG, HCGS  OTHER:   Lab Results   Component Value Date    SHAWN 9.3 03/13/2019    PROTTOTAL 6.9 08/21/2019    ALT 11.4 08/21/2019    AST 15.0 08/21/2019    ALKPHOS 26.8 (L) 08/21/2019    BILITOTAL <0.4 08/21/2019        Preop Vitals    BP Readings from Last 3 Encounters:   11/13/19 114/73   10/09/19 116/69   08/21/19 115/73    Pulse Readings from Last 3 Encounters:   11/13/19 92   10/09/19 85   08/21/19 75      Resp Readings from Last 3 Encounters:   11/13/19 19   08/21/19 16   05/31/19 16    SpO2 Readings from Last 3 Encounters:   11/13/19 96%   08/21/19 99%   08/13/19 100%      Temp Readings from Last 1 Encounters:   11/13/19 36.1  C (97  F) (Oral)    Ht Readings from Last 1 Encounters:   11/13/19 1.702 m (5' 7\")      Wt Readings from Last 1 Encounters:   11/13/19 80 kg (176 lb 4.8 oz)    Estimated body mass index is 27.61 kg/m  as calculated from the following:    Height as of 11/13/19: 1.702 m (5' 7\").    Weight as of 11/13/19: 80 kg (176 lb 4.8 oz).     LDA:        Assessment:   ASA SCORE: 2    H&P: History and physical reviewed and following examination; no interval change.   Smoking Status:  Non-Smoker/Unknown   NPO Status: NPO Appropriate     Plan:   Anes. Type:  General   Pre-Medication: None   Induction:  IV (Standard)   Airway: LMA   Access/Monitoring: PIV   Maintenance: Balanced     Postop Plan:   Postop Pain: Opioids  Postop Sedation/Airway: Not planned     PONV Management:   Adult Risk Factors: Female, Non-Smoker, Postop Opioids   Prevention: Ondansetron, Dexamethasone, Propofol     CONSENT: Direct conversation   Plan and risks discussed with: Patient   Blood Products: Consent Deferred (Minimal Blood Loss)       Comments for Plan/Consent:  Plan:  GA with routine monitors    Derrick Vargas MD                PAC Discussion and Assessment    ASA Classification: 2  Case is suitable for: ASC  Anesthetic techniques and relevant risks discussed: GA  Invasive monitoring and risk " discussed: No  Types:   Possibility and Risk of blood transfusion discussed: No  NPO instructions given:   Additional anesthetic preparation and risks discussed:   Needs early admission to pre-op area:   Other:     PAC Resident/NP Anesthesia Assessment:  Kun Florian is a 24 year old female (male to female transgender) scheduled for bilateral simple scrotal orchiectomy on 11/22/2019 by Dr. Morales in treatment of gender dysphoria.  PAC referral for risk assessment and optimization for anesthesia with comorbid conditions of epilepsy, dermatitis:    Pre-operative considerations:  1.  Cardiac:  Functional status- METS >4.  Low risk surgery with 0.4% (RCRI 0) risk of major adverse cardiac event.   -denies CP, SOB, GARCIA, palpitations  -denies cardiac history    2.  Pulm:  Airway feasible.  YUNI risk: low  -never smoker    3.  GI:  Risk of PONV score = 2.  If > 2, anti-emetic intervention recommended.    4.  Neuro:  History of epilepsy.  Last seizure 6/2019.  Pt maintained on lacosamide 100mg bid.  Will take DOS.    5.  Integumentary:  Pt with current dermatitis.  Derm is following.  Started Triamcinolone 11/12/19 with plan to follow up and possible biopsy.  Per derm note : Ddx contact dermatitis, psoriasis, considered dermatitis herpetiformis, Darier's disease.     6.  Other:  Gender dysphoria.  Male to female transition.  Procedure as above, continue estradiol and spironolactone.  Will take DOS.    VTE risk: 0.5%         **For further details of assessment, testing, and physical exam please see H and P completed on same date.          Daja Cutler PA-C, [facility]      Reviewed and Signed by PAC Mid-Level Provider/Resident  Mid-Level Provider/Resident: Daja Cutler  Date: 11/13/2019  Time:     Attending Anesthesiologist Anesthesia Assessment:        Anesthesiologist:   Date:   Time:   Pass/Fail:   Disposition:     PAC Pharmacist Assessment:        Pharmacist:   Date:   Time:    Derrick Vargas MD

## 2019-11-22 ENCOUNTER — ANESTHESIA (OUTPATIENT)
Dept: SURGERY | Facility: AMBULATORY SURGERY CENTER | Age: 24
End: 2019-11-22

## 2019-11-22 ENCOUNTER — HOSPITAL ENCOUNTER (OUTPATIENT)
Facility: AMBULATORY SURGERY CENTER | Age: 24
End: 2019-11-22
Attending: UROLOGY
Payer: COMMERCIAL

## 2019-11-22 VITALS
RESPIRATION RATE: 14 BRPM | WEIGHT: 170 LBS | OXYGEN SATURATION: 100 % | BODY MASS INDEX: 25.76 KG/M2 | DIASTOLIC BLOOD PRESSURE: 71 MMHG | HEIGHT: 68 IN | TEMPERATURE: 97 F | SYSTOLIC BLOOD PRESSURE: 117 MMHG | HEART RATE: 80 BPM

## 2019-11-22 DIAGNOSIS — F64.9 GENDER DYSPHORIA: ICD-10-CM

## 2019-11-22 RX ORDER — ALBUTEROL SULFATE 0.83 MG/ML
2.5 SOLUTION RESPIRATORY (INHALATION)
Status: DISCONTINUED | OUTPATIENT
Start: 2019-11-22 | End: 2019-11-22 | Stop reason: HOSPADM

## 2019-11-22 RX ORDER — OXYCODONE HYDROCHLORIDE 5 MG/1
5-10 TABLET ORAL EVERY 4 HOURS PRN
Qty: 16 TABLET | Refills: 0 | Status: SHIPPED | OUTPATIENT
Start: 2019-11-22 | End: 2020-01-03

## 2019-11-22 RX ORDER — FENTANYL CITRATE 50 UG/ML
25-50 INJECTION, SOLUTION INTRAMUSCULAR; INTRAVENOUS
Status: DISCONTINUED | OUTPATIENT
Start: 2019-11-22 | End: 2019-11-22 | Stop reason: HOSPADM

## 2019-11-22 RX ORDER — PROPOFOL 10 MG/ML
INJECTION, EMULSION INTRAVENOUS CONTINUOUS PRN
Status: DISCONTINUED | OUTPATIENT
Start: 2019-11-22 | End: 2019-11-22

## 2019-11-22 RX ORDER — ONDANSETRON 2 MG/ML
INJECTION INTRAMUSCULAR; INTRAVENOUS PRN
Status: DISCONTINUED | OUTPATIENT
Start: 2019-11-22 | End: 2019-11-22

## 2019-11-22 RX ORDER — FENTANYL CITRATE 50 UG/ML
INJECTION, SOLUTION INTRAMUSCULAR; INTRAVENOUS PRN
Status: DISCONTINUED | OUTPATIENT
Start: 2019-11-22 | End: 2019-11-22

## 2019-11-22 RX ORDER — SODIUM CHLORIDE, SODIUM LACTATE, POTASSIUM CHLORIDE, CALCIUM CHLORIDE 600; 310; 30; 20 MG/100ML; MG/100ML; MG/100ML; MG/100ML
INJECTION, SOLUTION INTRAVENOUS CONTINUOUS
Status: DISCONTINUED | OUTPATIENT
Start: 2019-11-22 | End: 2019-11-22 | Stop reason: HOSPADM

## 2019-11-22 RX ORDER — GABAPENTIN 300 MG/1
300 CAPSULE ORAL ONCE
Status: COMPLETED | OUTPATIENT
Start: 2019-11-22 | End: 2019-11-22

## 2019-11-22 RX ORDER — SODIUM CHLORIDE, SODIUM LACTATE, POTASSIUM CHLORIDE, CALCIUM CHLORIDE 600; 310; 30; 20 MG/100ML; MG/100ML; MG/100ML; MG/100ML
INJECTION, SOLUTION INTRAVENOUS CONTINUOUS
Status: DISCONTINUED | OUTPATIENT
Start: 2019-11-22 | End: 2019-11-23 | Stop reason: HOSPADM

## 2019-11-22 RX ORDER — CEFAZOLIN SODIUM 1 G/50ML
1 SOLUTION INTRAVENOUS SEE ADMIN INSTRUCTIONS
Status: DISCONTINUED | OUTPATIENT
Start: 2019-11-22 | End: 2019-11-22 | Stop reason: HOSPADM

## 2019-11-22 RX ORDER — BACITRACIN ZINC 500 [USP'U]/G
OINTMENT TOPICAL PRN
Status: DISCONTINUED | OUTPATIENT
Start: 2019-11-22 | End: 2019-11-22 | Stop reason: HOSPADM

## 2019-11-22 RX ORDER — ONDANSETRON 2 MG/ML
4 INJECTION INTRAMUSCULAR; INTRAVENOUS EVERY 30 MIN PRN
Status: DISCONTINUED | OUTPATIENT
Start: 2019-11-22 | End: 2019-11-23 | Stop reason: HOSPADM

## 2019-11-22 RX ORDER — AMOXICILLIN 250 MG
1-2 CAPSULE ORAL 2 TIMES DAILY
Qty: 30 TABLET | Refills: 0 | Status: SHIPPED | OUTPATIENT
Start: 2019-11-22 | End: 2020-01-03

## 2019-11-22 RX ORDER — CEFAZOLIN SODIUM 1 G/3ML
INJECTION, POWDER, FOR SOLUTION INTRAMUSCULAR; INTRAVENOUS PRN
Status: DISCONTINUED | OUTPATIENT
Start: 2019-11-22 | End: 2019-11-22

## 2019-11-22 RX ORDER — ACETAMINOPHEN 325 MG/1
975 TABLET ORAL ONCE
Status: COMPLETED | OUTPATIENT
Start: 2019-11-22 | End: 2019-11-22

## 2019-11-22 RX ORDER — ONDANSETRON 4 MG/1
4 TABLET, ORALLY DISINTEGRATING ORAL EVERY 30 MIN PRN
Status: DISCONTINUED | OUTPATIENT
Start: 2019-11-22 | End: 2019-11-23 | Stop reason: HOSPADM

## 2019-11-22 RX ORDER — LIDOCAINE 40 MG/G
CREAM TOPICAL
Status: DISCONTINUED | OUTPATIENT
Start: 2019-11-22 | End: 2019-11-22 | Stop reason: HOSPADM

## 2019-11-22 RX ORDER — GLYCOPYRROLATE 0.2 MG/ML
INJECTION, SOLUTION INTRAMUSCULAR; INTRAVENOUS PRN
Status: DISCONTINUED | OUTPATIENT
Start: 2019-11-22 | End: 2019-11-22

## 2019-11-22 RX ORDER — FENTANYL CITRATE 50 UG/ML
25-50 INJECTION, SOLUTION INTRAMUSCULAR; INTRAVENOUS
Status: DISCONTINUED | OUTPATIENT
Start: 2019-11-22 | End: 2019-11-23 | Stop reason: HOSPADM

## 2019-11-22 RX ORDER — BUPIVACAINE HYDROCHLORIDE AND EPINEPHRINE 5; 5 MG/ML; UG/ML
INJECTION, SOLUTION EPIDURAL; INTRACAUDAL; PERINEURAL PRN
Status: DISCONTINUED | OUTPATIENT
Start: 2019-11-22 | End: 2019-11-22 | Stop reason: HOSPADM

## 2019-11-22 RX ORDER — OXYCODONE HYDROCHLORIDE 5 MG/1
5 TABLET ORAL EVERY 4 HOURS PRN
Status: DISCONTINUED | OUTPATIENT
Start: 2019-11-22 | End: 2019-11-23 | Stop reason: HOSPADM

## 2019-11-22 RX ORDER — DEXAMETHASONE SODIUM PHOSPHATE 4 MG/ML
INJECTION, SOLUTION INTRA-ARTICULAR; INTRALESIONAL; INTRAMUSCULAR; INTRAVENOUS; SOFT TISSUE PRN
Status: DISCONTINUED | OUTPATIENT
Start: 2019-11-22 | End: 2019-11-22

## 2019-11-22 RX ORDER — CEFAZOLIN SODIUM 2 G/50ML
2 SOLUTION INTRAVENOUS
Status: DISCONTINUED | OUTPATIENT
Start: 2019-11-22 | End: 2019-11-22 | Stop reason: HOSPADM

## 2019-11-22 RX ORDER — KETOROLAC TROMETHAMINE 30 MG/ML
INJECTION, SOLUTION INTRAMUSCULAR; INTRAVENOUS PRN
Status: DISCONTINUED | OUTPATIENT
Start: 2019-11-22 | End: 2019-11-22

## 2019-11-22 RX ORDER — NALOXONE HYDROCHLORIDE 0.4 MG/ML
.1-.4 INJECTION, SOLUTION INTRAMUSCULAR; INTRAVENOUS; SUBCUTANEOUS
Status: DISCONTINUED | OUTPATIENT
Start: 2019-11-22 | End: 2019-11-23 | Stop reason: HOSPADM

## 2019-11-22 RX ORDER — LIDOCAINE HYDROCHLORIDE 20 MG/ML
INJECTION, SOLUTION INFILTRATION; PERINEURAL PRN
Status: DISCONTINUED | OUTPATIENT
Start: 2019-11-22 | End: 2019-11-22

## 2019-11-22 RX ORDER — ALBUTEROL SULFATE 0.83 MG/ML
2.5 SOLUTION RESPIRATORY (INHALATION) EVERY 4 HOURS PRN
Status: DISCONTINUED | OUTPATIENT
Start: 2019-11-22 | End: 2019-11-22 | Stop reason: HOSPADM

## 2019-11-22 RX ORDER — PROPOFOL 10 MG/ML
INJECTION, EMULSION INTRAVENOUS PRN
Status: DISCONTINUED | OUTPATIENT
Start: 2019-11-22 | End: 2019-11-22

## 2019-11-22 RX ADMIN — OXYCODONE HYDROCHLORIDE 5 MG: 5 TABLET ORAL at 10:19

## 2019-11-22 RX ADMIN — ONDANSETRON 4 MG: 2 INJECTION INTRAMUSCULAR; INTRAVENOUS at 09:27

## 2019-11-22 RX ADMIN — PROPOFOL 300 MG: 10 INJECTION, EMULSION INTRAVENOUS at 08:46

## 2019-11-22 RX ADMIN — SODIUM CHLORIDE, SODIUM LACTATE, POTASSIUM CHLORIDE, CALCIUM CHLORIDE: 600; 310; 30; 20 INJECTION, SOLUTION INTRAVENOUS at 08:36

## 2019-11-22 RX ADMIN — SODIUM CHLORIDE, SODIUM LACTATE, POTASSIUM CHLORIDE, CALCIUM CHLORIDE: 600; 310; 30; 20 INJECTION, SOLUTION INTRAVENOUS at 07:54

## 2019-11-22 RX ADMIN — FENTANYL CITRATE 50 MCG: 50 INJECTION, SOLUTION INTRAMUSCULAR; INTRAVENOUS at 08:38

## 2019-11-22 RX ADMIN — GLYCOPYRROLATE 0.2 MG: 0.2 INJECTION, SOLUTION INTRAMUSCULAR; INTRAVENOUS at 08:38

## 2019-11-22 RX ADMIN — DEXAMETHASONE SODIUM PHOSPHATE 4 MG: 4 INJECTION, SOLUTION INTRA-ARTICULAR; INTRALESIONAL; INTRAMUSCULAR; INTRAVENOUS; SOFT TISSUE at 08:50

## 2019-11-22 RX ADMIN — KETOROLAC TROMETHAMINE 30 MG: 30 INJECTION, SOLUTION INTRAMUSCULAR; INTRAVENOUS at 09:27

## 2019-11-22 RX ADMIN — GABAPENTIN 300 MG: 300 CAPSULE ORAL at 07:47

## 2019-11-22 RX ADMIN — FENTANYL CITRATE 50 MCG: 50 INJECTION, SOLUTION INTRAMUSCULAR; INTRAVENOUS at 09:59

## 2019-11-22 RX ADMIN — CEFAZOLIN SODIUM 2 G: 1 INJECTION, POWDER, FOR SOLUTION INTRAMUSCULAR; INTRAVENOUS at 08:52

## 2019-11-22 RX ADMIN — LIDOCAINE HYDROCHLORIDE 100 MG: 20 INJECTION, SOLUTION INFILTRATION; PERINEURAL at 08:46

## 2019-11-22 RX ADMIN — ACETAMINOPHEN 975 MG: 325 TABLET ORAL at 07:47

## 2019-11-22 RX ADMIN — FENTANYL CITRATE 25 MCG: 50 INJECTION, SOLUTION INTRAMUSCULAR; INTRAVENOUS at 10:13

## 2019-11-22 RX ADMIN — PROPOFOL 50 MCG/KG/MIN: 10 INJECTION, EMULSION INTRAVENOUS at 08:46

## 2019-11-22 ASSESSMENT — MIFFLIN-ST. JEOR: SCORE: 1569.61

## 2019-11-22 NOTE — OP NOTE
PREOPERATIVE DIAGNOSIS:   Gender Dysphoria    POSTOPERATIVE DIAGNOSIS:    Gender Dysphoria    PROCEDURES PERFORMED:   bilateral simple scrotal orchiectomy    STAFF SURGEON:     Toribio Morales MD  RESIDENT(S):            Gavin Angel MD; Evangelista Reyna MD MS   ANESTHESIA:            General    ESTIMATED BLOOD LOSS: 5 mL.   IV FLUIDS: see dictated anesthesia record  COMPLICATIONS: None.   SPECIMEN:    bilateral testicle and spermatic cord up to the level of the external ring     SIGNIFICANT FINDINGS:   Ligation of the cord at the level approximately of the external ring; testicle excised en bloc.    BRIEF OPERATIVE INDICATIONS:Kun Florian is a 24 year old transgender female wishing to undergo bilateral orchiectomy for gender affirming surgery or the treatment of dysphoria.     DESCRIPTION OF PROCEDURE: After full informed voluntary consent was obtained, the patient was transported to the operating room, placed supine on the table. After adequate anesthesia was induced, they were prepped and draped in the usual sterile fashion. A timeout was taken to confirm correct patient, procedure and laterality      We began the procedure by marking a 3 cm scrotal incision beginning just inferior to the penoscrotal junction at the midline raphe. Incision was made using a scalpel and electrocautery was used to carry dissection down through the dartos muscle. The right testicle was delivered into the wound. Tunica vaginalis was intact. Blunt and electrocautery dissection was continued proximally to mobilize the cord.    The cord was dissected superiorly up to the level of the external ring. A cord block was done with 0.5% Marcaine directly into the cord and massaged for adequate anesthesia. It was divided into two segments and separately clamped and divided. Additional clamps were placed proximally and distally to the divided segments. Each segment was individually dissected with scissors and the left testicle was removed  from the field. Each segment was then stick tied with 0 silk suture, then an additional 0 silk suture was stick tied proximal to the divided cord remnants.   The procedure as stated above was then repeated on the left side.   Irrigation was performed and the field examined for any residual signs of bleeding. Hemostasis was excellent.      The dartos was closed with a running 3-0 vicryl suture. 0.5% Marcaine was then injected subcutaneously prior to closure with a  followed by the skin with running 4-0 monocryl suture using a horizontal mattress technique. Bacitracin was then applied with a loose dressing and scrotal support applied.     Patient tolerated the procedure well. No apparent complications. She was transported to the postanesthesia care unit in stable condition    Evangelista Reyna MD MS  Urology PGY1    As attending surgeon, TAMICA, Toribio Morales MD, was scrubbed and present for the entire procedure.

## 2019-11-22 NOTE — ANESTHESIA CARE TRANSFER NOTE
Patient: Kun Florian    Procedure(s):  BILATERAL SIMPLE SCROTAL ORCHIECTOMY    Diagnosis: Gender dysphoria [F64.9]  Diagnosis Additional Information: No value filed.    Anesthesia Type:   General     Note:  Airway :Nasal Cannula  Patient transferred to:PACU  Comments: Arrive PACU, Stable, Airway Intact  132/85, 101,20,98%  All questions answered.Handoff Report: Identifed the Patient, Identified the Reponsible Provider, Reviewed the pertinent medical history, Discussed the surgical course, Reviewed Intra-OP anesthesia mangement and issues during anesthesia, Set expectations for post-procedure period and Allowed opportunity for questions and acknowledgement of understanding      Vitals: (Last set prior to Anesthesia Care Transfer)    CRNA VITALS  11/22/2019 0910 - 11/22/2019 0945      11/22/2019             Pulse:  105    SpO2:  100 %    Resp Rate (observed):  (!) 1    Resp Rate (set):  10                Electronically Signed By: JOSE ALBERTO Cortez CRNA  November 22, 2019  9:45 AM

## 2019-11-22 NOTE — OR NURSING
"Pt. States she had \"swelling of the throat last time she had codeine.\" Oxycodone ordered for take home pain medication. Dr. Morales aware and at bedside. One dose of oxycodone to be administered in PACU with a 1 hour observation time for s/s of allergic reaction. Oxycodone administered in PACU, pt. Moved to phase 2 and monitoring continues.   "

## 2019-11-22 NOTE — DISCHARGE INSTRUCTIONS
[facility]  Home Care Following Anesthesia  For 24 hours after surgery:  1. Get plenty of rest.  A responsible adult must stay with you for at least 24 hours after you leave the surgery center.  2. Do not drive or use heavy equipment.  If you have weakness or tingling, don't drive or use heavy equipment until this feeling goes away.   3. Do not drink alcohol.   4. Avoid strenuous or risky activities.  Ask for help when climbing stairs.  5. You may feel lightheaded.  IF so, sit for a few minutes before standing.  Have someone help you get up.   6. If you have nausea (feel sick to your stomach): Drink only clear liquids such as apple juice, ginger ale, broth or 7-Up.  Rest may also help.  Be sure to drink enough fluids.  Move to a regular diet as you feel able.   7. You may have a slight fever.  Call the doctor if your fever is over 100 F (37.7 C) (taken under the tongue) or lasts longer than 24 hours.  8. You may have a dry mouth, a sore throat, muscle aches or trouble sleeping. These should go away after 24 hours.  9. Do not make important or legal decisions.               Tips for taking pain medications  To get the best pain relief possible, remember these points:    Take pain medications as directed, before pain becomes severe.    Pain medication can upset your stomach: taking it with food may help.    Constipation is a common side effect of pain medication. Drink plenty of  fluids.    Eat foods high in fiber. Take a stool softener if recommended by your doctor or pharmacist.    Do not drink alcohol, drive or operate machinery while taking pain medications.    Ask about other ways to control pain, such as with heat, ice or relaxation.    Tylenol/Acetaminophen Consumption  To help encourage the safe use of acetaminophen, the makers of TYLENOL  have lowered the maximum daily dose for single-ingredient Extra Strength TYLENOL  (acetaminophen) products sold in the [place] from 8  pills per day (4,000 mg) to 6 pills per day (3,000 mg). The dosing interval has also changed from 2 pills every 4-6 hours to 2 pills every 6 hours.    If you feel your pain relief is insufficient, you may take Tylenol/Acetaminophen in addition to your narcotic pain medication.     Be careful not to exceed 3,000 mg of Tylenol/Acetaminophen in a 24 hour period from all sources.    If you are taking extra strength Tylenol/acetaminophen (500 mg), the maximum dose is 6 tablets in 24 hours.    If you are taking regular strength acetaminophen (325 mg), the maximum dose is 9 tablets in 24 hours.    Call a doctor for any of the followin. Signs of infection (fever, growing tenderness at the surgery site, a large amount of drainage or bleeding, severe pain, foul-smelling drainage, redness, swelling).  2. It has been over 8 to 10 hours since surgery and you are still not able to urinate (pass water).  3. Headache for over 24 hours.  4. Numbness, tingling or weakness the day after surgery (if you had spinal anesthesia).  Your doctor is:       Dr. Toribio Morales, Prostate and Urology: [phone]               Or dial [phone] and ask for the resident on call for:  Prostate Urology  For emergency care, call the:  [facility]:  [phone] (TTY for hearing impaired: [phone])

## 2019-11-22 NOTE — ANESTHESIA POSTPROCEDURE EVALUATION
Anesthesia POST Procedure Evaluation    Patient: Kun Florian   MRN:     [id number] Gender:   adult   Age:    24 year old :      1995        Preoperative Diagnosis: Gender dysphoria [F64.9]   Procedure(s):  BILATERAL SIMPLE SCROTAL ORCHIECTOMY   Postop Comments: No value filed.       Anesthesia Type:  Not documented  General    Reportable Event: NO     PAIN: Uncomplicated   Sign Out status: Comfortable, Well controlled pain     PONV: No PONV   Sign Out status:  No Nausea or Vomiting     Neuro/Psych: Uneventful perioperative course   Sign Out Status: Preoperative baseline; Age appropriate mentation     Airway/Resp.: Uneventful perioperative course   Sign Out Status: Non labored breathing, age appropriate RR; Resp. Status within EXPECTED Parameters     CV: Uneventful perioperative course   Sign Out status: Appropriate BP and perfusion indices; Appropriate HR/Rhythm     Disposition:   Sign Out in:  PACU  Disposition:  Phase II; Home  Recovery Course: Uneventful  Follow-Up: Not required           Last Anesthesia Record Vitals:  CRNA VITALS  2019 0910 - 2019 0951      2019             Pulse:  105    SpO2:  100 %    Resp Rate (observed):  (!) 1    Resp Rate (set):  10          Last PACU Vitals:  Vitals Value Taken Time   /83 2019  9:42 AM   Temp     Pulse 112 2019  9:42 AM   Resp 10 2019  9:50 AM   SpO2 100 % 2019  9:50 AM   Temp src     NIBP     Pulse     SpO2     Resp     Temp     Ht Rate     Temp 2     Vitals shown include unvalidated device data.      Electronically Signed By: Derrick Vargas MD, 2019, 9:51 AM

## 2019-11-25 ENCOUNTER — PATIENT OUTREACH (OUTPATIENT)
Dept: UROLOGY | Facility: CLINIC | Age: 24
End: 2019-11-25

## 2019-11-26 LAB — COPATH REPORT: NORMAL

## 2019-12-04 ENCOUNTER — PRE VISIT (OUTPATIENT)
Dept: UROLOGY | Facility: CLINIC | Age: 24
End: 2019-12-04

## 2019-12-04 NOTE — TELEPHONE ENCOUNTER
Chief Complaint : Post Op-2 Weeks    Hx/Sx: s/p Bi Lat Orchiectomy     Records/Orders: Available     Pt Contacted: n/a    At Rooming: Normal

## 2019-12-11 ENCOUNTER — OFFICE VISIT (OUTPATIENT)
Dept: UROLOGY | Facility: CLINIC | Age: 24
End: 2019-12-11
Payer: COMMERCIAL

## 2019-12-11 VITALS
BODY MASS INDEX: 25.76 KG/M2 | HEART RATE: 63 BPM | HEIGHT: 68 IN | SYSTOLIC BLOOD PRESSURE: 116 MMHG | DIASTOLIC BLOOD PRESSURE: 75 MMHG | WEIGHT: 170 LBS

## 2019-12-11 DIAGNOSIS — F64.9 GENDER DYSPHORIA: Primary | ICD-10-CM

## 2019-12-11 ASSESSMENT — MIFFLIN-ST. JEOR: SCORE: 1569.61

## 2019-12-11 ASSESSMENT — PAIN SCALES - GENERAL: PAINLEVEL: NO PAIN (0)

## 2019-12-11 NOTE — PATIENT INSTRUCTIONS
Please follow up as needed.      It was a pleasure meeting with you today.  Thank you for allowing me and my team the privilege of caring for you today.  YOU are the reason we are here, and I truly hope we provided you with the excellent service you deserve.  Please let us know if there is anything else we can do for you so that we can be sure you are leaving completely satisfied with your care experience.

## 2019-12-11 NOTE — LETTER
"12/11/2019       RE: Kun Florian  [address] [facility] [address]     Dear Colleague,    Thank you for referring your patient, Kun Florian, to the [facility] UROLOG[facility] AND INST FOR PROSTATE AND UROLOGIC CANCERS at [facility]. Please see a copy of my visit note below.    Urology Postop    Kun Florian is a 24 year old adult who s/p bilateral orchiectomy for gender dysphoria on 11/22/2019    Pathology was negative.    Off pain meds. Still on estradiol. Off spironolactone.    /75   Pulse 63   Ht 1.727 m (5' 8\")   Wt 77.1 kg (170 lb)   BMI 25.85 kg/m       Exam:  Incision clean, dry, intact  No tenderness or evidence of cellulitis  No hematoma    A/P   Excellent outcome after orchiectomy  Continue with hair removal process.  Then followup with Dr. Butler to work towards vaginoplasty (full depth). We discussed details today again.    Toribio Morales MD    Again, thank you for allowing me to participate in the care of your patient.      Sincerely,    Toribio Morales MD      "

## 2019-12-11 NOTE — NURSING NOTE
Chief Complaint   Patient presents with     RECHECK     s/p Bi Lat Orchiectomy        Flores Hope MA

## 2019-12-11 NOTE — PROGRESS NOTES
"Urology Postop    Kun Florian is a 24 year old adult who s/p bilateral orchiectomy for gender dysphoria on 11/22/2019    Pathology was negative.    Off pain meds. Still on estradiol. Off spironolactone.    /75   Pulse 63   Ht 1.727 m (5' 8\")   Wt 77.1 kg (170 lb)   BMI 25.85 kg/m      Exam:  Incision clean, dry, intact  No tenderness or evidence of cellulitis  No hematoma    A/P   Excellent outcome after orchiectomy  Continue with hair removal process.  Then followup with Dr. Butler to work towards vaginoplasty (full depth). We discussed details today again.    Toribio Morales MD  "

## 2019-12-16 ENCOUNTER — OFFICE VISIT (OUTPATIENT)
Dept: DERMATOLOGY | Facility: CLINIC | Age: 24
End: 2019-12-16
Payer: COMMERCIAL

## 2019-12-16 DIAGNOSIS — L30.9 DERMATITIS: Primary | ICD-10-CM

## 2019-12-16 PROCEDURE — 99213 OFFICE O/P EST LOW 20 MIN: CPT | Performed by: DERMATOLOGY

## 2019-12-16 ASSESSMENT — PAIN SCALES - GENERAL: PAINLEVEL: NO PAIN (0)

## 2019-12-16 NOTE — PROGRESS NOTES
[facility] Dermatology Note    Dermatology Problem List:  1. Gender dysphoria, pending vaginoplasty  - [facility] PA letter prepared 11/12/19  2. KP, no current t/x  3. Dermatitis, abdomen, upper extremities back. Ddx contact dermatitis, psoriasis, considered dermatitis herpetiformis, Darier's disease.   -current t/x:  triamcinolone 0.1% ointment BID  - low threshold for biopsy at follow-up    Encounter Date: Dec 16, 2019    CC:   Chief Complaint   Patient presents with     Derm Problem     dermatitis. seeing improvement     History of Present Illness:  Ms. Kun Florian is a 24 year old adult who presents as a follow-up for dermatitis. She was last seen on 11/12/19 when she was treated with TAC 0.1% ointment BID. This was previously present for about 1 year and was previously diagnosed as a nickel allergy. Today she reports that she has been seeing improvement with this treatment. She is currently using triamcinolone 0.1% ointment BID. No other skin concerns.     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 She has never used smokeless tobacco. She reports current alcohol use. She reports that she does not use drugs.    Family History:  Family History   Problem Relation Age of Onset     Breast Cancer Mother      Bleeding Disorder Father        Medications:  Current Outpatient Medications   Medication Sig Dispense Refill     estradiol (ESTRACE) 2 MG  tablet Take 2 tablets (4 mg) by mouth daily (Patient taking differently: Take 4 mg by mouth 2 times daily ) 180 tablet 0     Lacosamide (VIMPAT) 100 MG TABS tablet Take 1 tablet (100 mg) by mouth 2 times daily 180 tablet 0     spironolactone (ALDACTONE) 100 MG tablet Take 1 tablet (100 mg) by mouth 2 times daily 120 tablet 3     triamcinolone (KENALOG) 0.1 % external ointment Apply twice daily to rash on abdomen 454 g 3     oxyCODONE (ROXICODONE) 5 MG tablet Take 1-2 tablets (5-10 mg) by mouth every 4 hours as needed for moderate to severe pain (Patient not taking: Reported on 12/16/2019) 16 tablet 0     senna-docusate (SENOKOT-S/PERICOLACE) 8.6-50 MG tablet Take 1-2 tablets by mouth 2 times daily (Patient not taking: Reported on 12/16/2019) 30 tablet 0        Allergies   Allergen Reactions     Codeine Hives     Difficulty breathing, throat swelling..Happen approximately 14 years ago      Nickel Rash       Review of Systems:  -Skin Establ Pt: The patient denies any new rash, pruritus, or lesions that are symptomatic, changing or bleeding, except as per HPI.  -Constitutional: Otherwise feeling well today, in usual state of health.  -HEENT: Patient denies nonhealing oral sores.  -Skin: As above in HPI. No additional skin concerns.    Physical exam:  Vitals: There were no vitals taken for this visit.  GEN: This is a well developed, well-nourished female in no acute distress, in a pleasant mood.    SKIN: Focused examination of the face, neck, chest, abdomen, back, upper extremities, genital area was performed.  - Cheng skin type: II  - Mild xerosis cutis  - Few scattered crusted papules on abdomen and right lower back, with admixed hyper and hypopigmented macules at prior involved sites.   - Arms clears.   - No other lesions of concern on areas examined.     Impression/Plan:    1. Keratosis pilaris , arms  - Patient not bothered, no intervention required    2. Dermatitis, abdomen, back  and upper extremities much  improved. Differential allergic contact dermatitis versus atopic dermatitis.     Continue triam 0.1% ointment BID as needed until rash resolved completely    Use daily moisturizer, Cereva, aquafor, or vanicream     If it returns, consider patch testing with Dr. Franks    Follow-up PRN, earlier for new or changing lesions. Patient is schedule for [facility] on 1/3/2020 with Dr. Keyes.     Staff Involved:  Staff/Scribe    Scribe Disclosure  I, Debby Hand, am serving as a scribe to document services personally performed by Dr. Epifanio Biggs MD, based on data collection and the provider's statements to me.    Provider Disclosure:   The documentation recorded by the scribe accurately reflects the services I personally performed and the decisions made by me.    Epifanio Biggs MD    Department of Dermatology  [facility]: Phone: [phone], Fax:[phone]  [facility]: Phone: [phone], Fax: [phone]

## 2019-12-16 NOTE — LETTER
12/16/2019         RE: Kun Florian  [address] [facility] [address]        Dear Colleague,    Thank you for referring your patient, Kun Florian, to the [facility]. Please see a copy of my visit note below.    [facility] Dermatology Note    Dermatology Problem List:  1. Gender dysphoria, pending vaginoplasty  - [facility] PA letter prepared 11/12/19  2. KP, no current t/x  3. Dermatitis, abdomen, upper extremities back. Ddx contact dermatitis, psoriasis, considered dermatitis herpetiformis, Darier's disease.   -current t/x:  triamcinolone 0.1% ointment BID  - low threshold for biopsy at follow-up    Encounter Date: Dec 16, 2019    CC:   Chief Complaint   Patient presents with     Derm Problem     dermatitis. seeing improvement     History of Present Illness:  Ms. Kun Florian is a 24 year old adult who presents as a follow-up for dermatitis. She was last seen on 11/12/19 when she was treated with TAC 0.1% ointment BID. This was previously present for about 1 year and was previously diagnosed as a nickel allergy. Today she reports that she has been seeing improvement with this treatment. She is currently using triamcinolone 0.1% ointment BID. No other skin concerns.     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 She has never used smokeless tobacco. She reports current alcohol use. She reports that she does not use  drugs.    Family History:  Family History   Problem Relation Age of Onset     Breast Cancer Mother      Bleeding Disorder Father        Medications:  Current Outpatient Medications   Medication Sig Dispense Refill     estradiol (ESTRACE) 2 MG tablet Take 2 tablets (4 mg) by mouth daily (Patient taking differently: Take 4 mg by mouth 2 times daily ) 180 tablet 0     Lacosamide (VIMPAT) 100 MG TABS tablet Take 1 tablet (100 mg) by mouth 2 times daily 180 tablet 0     spironolactone (ALDACTONE) 100 MG tablet Take 1 tablet (100 mg) by mouth 2 times daily 120 tablet 3     triamcinolone (KENALOG) 0.1 % external ointment Apply twice daily to rash on abdomen 454 g 3     oxyCODONE (ROXICODONE) 5 MG tablet Take 1-2 tablets (5-10 mg) by mouth every 4 hours as needed for moderate to severe pain (Patient not taking: Reported on 12/16/2019) 16 tablet 0     senna-docusate (SENOKOT-S/PERICOLACE) 8.6-50 MG tablet Take 1-2 tablets by mouth 2 times daily (Patient not taking: Reported on 12/16/2019) 30 tablet 0        Allergies   Allergen Reactions     Codeine Hives     Difficulty breathing, throat swelling..Happen approximately 14 years ago      Nickel Rash       Review of Systems:  -Skin Establ Pt: The patient denies any new rash, pruritus, or lesions that are symptomatic, changing or bleeding, except as per HPI.  -Constitutional: Otherwise feeling well today, in usual state of health.  -HEENT: Patient denies nonhealing oral sores.  -Skin: As above in HPI. No additional skin concerns.    Physical exam:  Vitals: There were no vitals taken for this visit.  GEN: This is a well developed, well-nourished female in no acute distress, in a pleasant mood.    SKIN: Focused examination of the face, neck, chest, abdomen, back, upper extremities, genital area was performed.  - Cheng skin type: II  - Mild xerosis cutis  - Few scattered crusted papules on abdomen and right lower back, with admixed hyper and hypopigmented macules at prior  involved sites.   - Arms clears.   - No other lesions of concern on areas examined.     Impression/Plan:    1. Keratosis pilaris , arms  - Patient not bothered, no intervention required    2. Dermatitis, abdomen, back  and upper extremities much improved. Differential allergic contact dermatitis versus atopic dermatitis.     Continue triam 0.1% ointment BID as needed until rash resolved completely    Use daily moisturizer, Cereva, aquafor, or vanicream     If it returns, consider patch testing with Dr. Franks    Follow-up PRN, earlier for new or changing lesions. Patient is schedule for [facility] on 1/3/2020 with Dr. Keyes.     Staff Involved:  Staff/Scribe    Scribe Disclosure  I, Debby Hand, am serving as a scribe to document services personally performed by Dr. Epifanio Biggs MD, based on data collection and the provider's statements to me.    Provider Disclosure:   The documentation recorded by the scribe accurately reflects the services I personally performed and the decisions made by me.    Epifanio Biggs MD    Department of Dermatology  [facility]: Phone: [phone], Fax:[phone]  [facility]: Phone: [phone], Fax: [phone]            Again, thank you for allowing me to participate in the care of your patient.        Sincerely,        Epifanio Biggs MD

## 2019-12-16 NOTE — NURSING NOTE
Kun Florian's goals for this visit include:   Chief Complaint   Patient presents with     Derm Problem     dermatitis. seeing improvement       She requests these members of her care team be copied on today's visit information: Yes    PCP: Josie Macias    Referring Provider:  No referring provider defined for this encounter.    There were no vitals taken for this visit.    Do you need any medication refills at today's visit? No    Radha Stevens LPN

## 2019-12-28 ENCOUNTER — MYC REFILL (OUTPATIENT)
Dept: FAMILY MEDICINE | Facility: CLINIC | Age: 24
End: 2019-12-28

## 2019-12-28 DIAGNOSIS — F64.0 GENDER DYSPHORIA IN ADULT: Chronic | ICD-10-CM

## 2019-12-28 RX ORDER — ESTRADIOL 2 MG/1
4 TABLET ORAL DAILY
Qty: 180 TABLET | Refills: 0 | Status: CANCELLED | OUTPATIENT
Start: 2019-12-28

## 2020-01-03 ENCOUNTER — OFFICE VISIT (OUTPATIENT)
Dept: DERMATOLOGY | Facility: CLINIC | Age: 25
End: 2020-01-03
Payer: COMMERCIAL

## 2020-01-03 DIAGNOSIS — L82.0 INFLAMED SEBORRHEIC KERATOSIS: ICD-10-CM

## 2020-01-03 DIAGNOSIS — F64.9 GENDER DYSPHORIA: Primary | ICD-10-CM

## 2020-01-03 PROCEDURE — 99207 ZZC DROP WITH A PROCEDURE: CPT | Performed by: DERMATOLOGY

## 2020-01-03 PROCEDURE — 17999 UNLISTD PX SKN MUC MEMB SUBQ: CPT | Performed by: DERMATOLOGY

## 2020-01-03 ASSESSMENT — PAIN SCALES - GENERAL: PAINLEVEL: NO PAIN (0)

## 2020-01-03 NOTE — LETTER
1/3/2020         RE: Kun Florian  [address] [facility] [address]        Dear Colleague,    Thank you for referring your patient, Kun Florian, to the [facility]. Please see a copy of my visit note below.    See procedure note.     Again, thank you for allowing me to participate in the care of your patient.        Sincerely,        Kimberlee Keyes MD

## 2020-01-03 NOTE — NURSING NOTE
Dermatology Laser Intake Checklist:  History of psoriasis:No  History of recent tan, indoor or outdoor tanning/vacation or other sun exposure: No  History of vitiligo:No  Family history of vitiligo:No  Recent other cosmetic procedure(microderm abrasion/peel/hair removal/facial etc):No  History of HSV:No   Did the patient start valtrex: n/a  For genital laser hair removal patient only: Is there a history of genital warts or condyloma:No  Tattoo in the area to be treated:No  Is patient using hydroquinone:No  Retinoids and other acne medications stopped for 2 weeks:n/a  Has the patient had accutane in the last 6-12 months:No  Pregnant or breastfeeding: No  History of skin cancer in area planned for treatment: No  History of treatment with gold:No  Changes in medical history: No  Photos obtained: No  Does the patient smoke:No  Is the patient on ibuprofen/aspirin/plavix/coumadin/other blood thinner: No  If patient is taking narcotic or diazepam(valium)-does patient have : No    Gilma Sanderson RN

## 2020-01-03 NOTE — PATIENT INSTRUCTIONS
Gentle Max Laser Hair Reduction Instructions    Laser hair reduction: I will have redness, pain and swelling. I may have skin discoloration, bruising and itching. Risks are permenant discoloration, hives, infection scarring, eye injury,hair growth, and blister. The outcome could be no improvement or slight improvement. Multiple treatments are required. All hair will not be removed.     Before the procedure:  Sun Protection:  Do not allow the area to become tan or come in with a tan for a treatment. Tans will increased the risks of the procedure. Do not use spray or any over counter product self tanners prior to the procedure.     Shaving:   Gently shave the area the evening before the procedure.     Other:  Avoid any retinols such as Differin, adapalene, retinol or tretinoin to the treated area 1 week prior. Hold bleaching creams such as hydroquinone until 1 week prior.  Avoid any hair removal procedures such as waxing, electrolysis or other lasers on the skin within 6 weeks prior. Do not have any other cosmetic procedures done on the area within the prior 6 weeks such as chemical peels or dermabrasion.      Post Procedure:  Day 1-7  The healing time for any given treatment varies between clients. The following represents the general recovery phases you might expect. Individual clients may experience variations from this course.     Swelling/Discomfort/Redness:  The most common side effects are erythema and edema(redness and swelling) which generally occur immediately after and typically resolve within 48 hours. Crusting and rarely blistering may occur.       Activity:  Avoid swimming the day of laser hair removal treatment. Also, no rigorous exercise the day of treatment.     Moisturizers and Sunscreens:  Wait until the skin has returned to normal to start topical products.     Sun Protection:  Do not allow the area to become tan or come in with a tan for a treatment. Tans will increased the risks of the procedure.  Do not use spray or any over counter product self tanners prior to the procedure.     Warning signs:  Call the clinic if you have significant pain, increasing redness, pus, blisters/crusting or for any other concerns.     Who should I call with questions?    [facility]: [phone]     [facility]: [phone]    For urgent needs outside of business hours call the [facility] at [phone] and ask for the dermatology resident on call

## 2020-01-03 NOTE — PROCEDURES
Laser Hair Removal Gentle Max Prox (755nm)  Procedure Date: Brandt 3, 2020    Staff Surgeon: Kimberlee Keyes MD    Assistant: Gilma Sanderson RN and Candi Renee LPN    Trimming required prior to treatment in clinic?: yes, patient shaved  Cheng skin type: II  Diagnosis/Treatment Reason: gender dysphoria    Treatment#: 1.  Patient asked if mons pubis could be treated.  Discussed this is not required for vaginoplasty and would leave visible scars.  She verbalized understanding and would like to proceed with laser hair reduction of mons pubis.    Test Area Settings:   Wavelength(755/1064nm): 755  Location: Left mons pubis  Fluence: 12J   Spot size: 18mm  Pulse width:  3 mS  Total Number of Pulses: 3  Dynamic cooling spray settin mS  Dynamic cooling device delay:  30 mS    Laser Settings:  Wavelength(755/1064nm): 755  Location: mons pubis, bilateral inguinal creases, perineum  Fluence: 14J - perifollicular erythema noted at end of treatment.  Spot size: 18mm  Pulse width:  3 mS  Total Number of Pulses: 125  Dynamic cooling spray settin mS  Dynamic cooling device delay:  30 mS    Laser Settings:  Wavelength(755/1064nm): 755  Location: penile shaft, scrotum  Fluence: 12J - ok to increase at next treatment if patient tolerated.  No perifollicular edema or erythema noted at end of treatment.  Spot size: 18mm  Pulse width:  3 mS  Total Number of Pulses: 32  Dynamic cooling spray settin mS  Dynamic cooling device delay:  30 mS    Anesthesia:  Topical LMX    Celia used?:No  Ice used?: Yes    Description of Operation/Procedure:   The nature and purpose of the procedure, associated risks, possible consequences, complications and alternative methods of treatment were explained in detail, this includes but is not limited to hyperpigmentation, hypopigmentation, scarring, bruising, hair loss pain/discomfort, eye injury, paradoxical hair growth, hives, infection, itching  and blister.   We reviewed that the  outcome could be any of the following: no improvement, slight improvement or change in skin color & texture, the skin might be permanently lighter or darker, and though uncommon, superficial scarring may occur.  Multiple treatments are required. We reviewed with patients that laser hair removal is a reduction in hair and not permanent. The risks were again reviewed including skin hyperpigmentation, hypopigmentation, scarring, bruising blistering. Reviewed white and red hair do not respond. Reviewed hair may regrow as it is not permanent but a reduction.  Reviewed that a 3 months waiting period prior to surgery to recommended. Reviewed that the patient can terminate the procedure at anytime. Electrolysis was offered as an alternative.     An operative consent was obtained. Time-out was performed.  The patient was positioned to optimally expose the area treated. Dynamic cooling was verified and functioning properly.  Protective eyewear was worn by the patient and goggles on all personnel in the treatment room.  The patient confirmed the site to be treated. The laser energy output was verified by meter reading.  N95 and buffalo filtration was used.     The clinically evident lesion(s) was/were treated with laser beam as above.  The patient tolerated the procedure well and no complications were noted. Post operative instructions were provided. The total laser operation and preparation time was 7 minutes.      Numeric pain scale after treatment: 4/10    The patient will follow-up pending insurance coverage.    Bill to insurance.    Dr. Keyes staffed the patient was present for identifying and marking target area(s), ascertainment of protective equipment utilization, calibration of laser, and treatment of area(s).    Staff Involved:  Gilma Sanderson RN performed procedure.    I was present. The patietn requested removal of all hair on the mons pubis. I reviewed extensively with the patient that this will result in visible  scars post surgery and the patient voiced understanding. FRANKLIN Sanderson present for consent process.     I was present for guzmán portions.     Kimberlee Keyes MD    Department of Dermatology  [facility]: Phone: [phone], Fax:[phone]  [facility]: Phone: [phone], Fax: [phone]

## 2020-01-03 NOTE — NURSING NOTE
1 gram of AneCream applied to mons pubis, bilateral inguinal creases, scrotum, penile shaft and perineum.    Lot #: [id number]  Exp: 7/2021.    Gilma Sanderson RN

## 2020-02-19 ENCOUNTER — MYC REFILL (OUTPATIENT)
Dept: FAMILY MEDICINE | Facility: CLINIC | Age: 25
End: 2020-02-19

## 2020-02-19 DIAGNOSIS — F64.0 GENDER DYSPHORIA IN ADULT: Chronic | ICD-10-CM

## 2020-02-19 RX ORDER — ESTRADIOL 2 MG/1
4 TABLET ORAL DAILY
Qty: 180 TABLET | Refills: 0 | Status: SHIPPED | OUTPATIENT
Start: 2020-02-19 | End: 2020-04-20

## 2020-02-19 RX ORDER — ESTRADIOL 2 MG/1
4 TABLET ORAL DAILY
Qty: 180 TABLET | Refills: 0 | Status: CANCELLED | OUTPATIENT
Start: 2020-02-19

## 2020-02-19 NOTE — TELEPHONE ENCOUNTER

## 2020-02-22 ENCOUNTER — MYC MEDICAL ADVICE (OUTPATIENT)
Dept: FAMILY MEDICINE | Facility: CLINIC | Age: 25
End: 2020-02-22

## 2020-03-11 ENCOUNTER — HEALTH MAINTENANCE LETTER (OUTPATIENT)
Age: 25
End: 2020-03-11

## 2020-04-20 ENCOUNTER — MYC REFILL (OUTPATIENT)
Dept: FAMILY MEDICINE | Facility: CLINIC | Age: 25
End: 2020-04-20

## 2020-04-20 DIAGNOSIS — F64.0 GENDER DYSPHORIA IN ADULT: Chronic | ICD-10-CM

## 2020-04-20 RX ORDER — ESTRADIOL 2 MG/1
4 TABLET ORAL DAILY
Qty: 180 TABLET | Refills: 0 | Status: SHIPPED | OUTPATIENT
Start: 2020-04-20 | End: 2020-06-03

## 2020-04-20 RX ORDER — SPIRONOLACTONE 100 MG/1
100 TABLET, FILM COATED ORAL 2 TIMES DAILY
COMMUNITY
Start: 2019-10-08 | End: 2020-04-20

## 2020-04-20 NOTE — TELEPHONE ENCOUNTER
Received refill request for estradiol 2 MG PO tablet -Rx-script passed clinic refill protocol, for the purpose of continue of care, refill request approved per clinic refill standing policy enough to last 90 days or till seen patient in clinic.    Message routed to PCP or to address the need for additional refills.    Gonzalo Ortega RN

## 2020-04-20 NOTE — TELEPHONE ENCOUNTER
Medication refill approved by RN. No further refills w/o labs. Patient last had them checked over a year ago.   Josie Macias MD

## 2020-06-05 DIAGNOSIS — F64.0 GENDER DYSPHORIA IN ADULT: Chronic | ICD-10-CM

## 2020-06-05 LAB — HBA1C MFR BLD: 5.3 % (ref 0–5.6)

## 2020-06-05 PROCEDURE — 83036 HEMOGLOBIN GLYCOSYLATED A1C: CPT | Performed by: FAMILY MEDICINE

## 2020-06-05 PROCEDURE — 80053 COMPREHEN METABOLIC PANEL: CPT | Performed by: FAMILY MEDICINE

## 2020-06-05 PROCEDURE — 80061 LIPID PANEL: CPT | Performed by: FAMILY MEDICINE

## 2020-06-05 PROCEDURE — 36415 COLL VENOUS BLD VENIPUNCTURE: CPT | Performed by: FAMILY MEDICINE

## 2020-06-05 PROCEDURE — 84403 ASSAY OF TOTAL TESTOSTERONE: CPT | Performed by: FAMILY MEDICINE

## 2020-06-06 LAB
ALBUMIN SERPL-MCNC: 3.7 G/DL (ref 3.4–5)
ALP SERPL-CCNC: 35 U/L (ref 40–150)
ALT SERPL W P-5'-P-CCNC: 27 U/L (ref 0–50)
ANION GAP SERPL CALCULATED.3IONS-SCNC: 5 MMOL/L (ref 3–14)
AST SERPL W P-5'-P-CCNC: 15 U/L (ref 0–45)
BILIRUB SERPL-MCNC: 0.2 MG/DL (ref 0.2–1.3)
BUN SERPL-MCNC: 10 MG/DL (ref 7–30)
CALCIUM SERPL-MCNC: 8.4 MG/DL (ref 8.5–10.1)
CHLORIDE SERPL-SCNC: 104 MMOL/L (ref 94–109)
CHOLEST SERPL-MCNC: 136 MG/DL
CO2 SERPL-SCNC: 28 MMOL/L (ref 20–32)
CREAT SERPL-MCNC: 0.85 MG/DL (ref 0.52–1.04)
GFR SERPL CREATININE-BSD FRML MDRD: >90 ML/MIN/{1.73_M2}
GLUCOSE SERPL-MCNC: 93 MG/DL (ref 70–99)
HDLC SERPL-MCNC: 52 MG/DL
LDLC SERPL CALC-MCNC: 64 MG/DL
NONHDLC SERPL-MCNC: 84 MG/DL
POTASSIUM SERPL-SCNC: 3.8 MMOL/L (ref 3.4–5.3)
PROT SERPL-MCNC: 7.7 G/DL (ref 6.8–8.8)
SODIUM SERPL-SCNC: 137 MMOL/L (ref 133–144)
TRIGL SERPL-MCNC: 102 MG/DL

## 2020-06-09 LAB — TESTOST SERPL-MCNC: 17 NG/DL (ref 8–60)

## 2020-06-15 ENCOUNTER — MYC REFILL (OUTPATIENT)
Dept: FAMILY MEDICINE | Facility: CLINIC | Age: 25
End: 2020-06-15

## 2020-06-15 DIAGNOSIS — F64.0 GENDER DYSPHORIA IN ADULT: Chronic | ICD-10-CM

## 2020-06-17 RX ORDER — ESTRADIOL 2 MG/1
2 TABLET ORAL 2 TIMES DAILY
Qty: 60 TABLET | Refills: 0 | OUTPATIENT
Start: 2020-06-17

## 2020-06-17 NOTE — TELEPHONE ENCOUNTER
Patient was just given a 30 day refill 2 weeks ago and needs appointment. Forwarded to refill pool to contact patient.   Josie Macias MD

## 2020-08-05 DIAGNOSIS — F64.0 GENDER DYSPHORIA IN ADULT: Chronic | ICD-10-CM

## 2020-08-05 NOTE — TELEPHONE ENCOUNTER

## 2020-08-11 RX ORDER — ESTRADIOL 2 MG/1
2 TABLET ORAL 2 TIMES DAILY
Qty: 60 TABLET | Refills: 0 | Status: SHIPPED | OUTPATIENT
Start: 2020-08-11 | End: 2020-09-09

## 2020-09-01 ENCOUNTER — HOSPITAL ENCOUNTER (OUTPATIENT)
Facility: CLINIC | Age: 25
Setting detail: OBSERVATION
Discharge: HOME | End: 2020-09-02
Attending: EMERGENCY MEDICINE | Admitting: PSYCHIATRY & NEUROLOGY
Payer: MEDICAID

## 2020-09-01 DIAGNOSIS — G40.409 OTHER GENERALIZED EPILEPSY, NOT INTRACTABLE, WITHOUT STATUS EPILEPTICUS (H): ICD-10-CM

## 2020-09-01 DIAGNOSIS — G40.822 NONINTRACTABLE EPILEPTIC SPASMS WITHOUT STATUS EPILEPTICUS (H): Chronic | ICD-10-CM

## 2020-09-01 DIAGNOSIS — R56.9 SEIZURE (H): Primary | ICD-10-CM

## 2020-09-01 LAB
ALBUMIN UR-MCNC: NEGATIVE MG/DL
ANION GAP SERPL CALCULATED.3IONS-SCNC: 7 MMOL/L (ref 3–14)
APPEARANCE UR: CLEAR
BASOPHILS # BLD AUTO: 0 10E9/L (ref 0–0.2)
BASOPHILS NFR BLD AUTO: 0.3 %
BILIRUB UR QL STRIP: NEGATIVE
BUN SERPL-MCNC: 12 MG/DL (ref 7–30)
CALCIUM SERPL-MCNC: 8.4 MG/DL (ref 8.5–10.1)
CHLORIDE SERPL-SCNC: 106 MMOL/L (ref 94–109)
CO2 SERPL-SCNC: 27 MMOL/L (ref 20–32)
COLOR UR AUTO: ABNORMAL
CREAT SERPL-MCNC: 0.66 MG/DL (ref 0.52–1.04)
DIFFERENTIAL METHOD BLD: NORMAL
EOSINOPHIL # BLD AUTO: 0.2 10E9/L (ref 0–0.7)
EOSINOPHIL NFR BLD AUTO: 1.9 %
ERYTHROCYTE [DISTWIDTH] IN BLOOD BY AUTOMATED COUNT: 11.9 % (ref 10–15)
GFR SERPL CREATININE-BSD FRML MDRD: >90 ML/MIN/{1.73_M2}
GLUCOSE BLDC GLUCOMTR-MCNC: 106 MG/DL (ref 70–99)
GLUCOSE SERPL-MCNC: 105 MG/DL (ref 70–99)
GLUCOSE UR STRIP-MCNC: NEGATIVE MG/DL
HCT VFR BLD AUTO: 36.9 % (ref 35–47)
HGB BLD-MCNC: 12.4 G/DL (ref 11.7–15.7)
HGB UR QL STRIP: NEGATIVE
IMM GRANULOCYTES # BLD: 0 10E9/L (ref 0–0.4)
IMM GRANULOCYTES NFR BLD: 0.2 %
KETONES UR STRIP-MCNC: 5 MG/DL
LACTATE BLD-SCNC: 3.3 MMOL/L (ref 0.7–2)
LEUKOCYTE ESTERASE UR QL STRIP: NEGATIVE
LYMPHOCYTES # BLD AUTO: 1.9 10E9/L (ref 0.8–5.3)
LYMPHOCYTES NFR BLD AUTO: 20.7 %
MCH RBC QN AUTO: 28.7 PG (ref 26.5–33)
MCHC RBC AUTO-ENTMCNC: 33.6 G/DL (ref 31.5–36.5)
MCV RBC AUTO: 85 FL (ref 78–100)
MONOCYTES # BLD AUTO: 1 10E9/L (ref 0–1.3)
MONOCYTES NFR BLD AUTO: 10.8 %
MUCOUS THREADS #/AREA URNS LPF: PRESENT /LPF
NEUTROPHILS # BLD AUTO: 5.9 10E9/L (ref 1.6–8.3)
NEUTROPHILS NFR BLD AUTO: 66.1 %
NITRATE UR QL: NEGATIVE
NRBC # BLD AUTO: 0 10*3/UL
NRBC BLD AUTO-RTO: 0 /100
PH UR STRIP: 5.5 PH (ref 5–7)
PLATELET # BLD AUTO: 276 10E9/L (ref 150–450)
POTASSIUM SERPL-SCNC: 3.6 MMOL/L (ref 3.4–5.3)
RBC # BLD AUTO: 4.32 10E12/L (ref 3.8–5.2)
RBC #/AREA URNS AUTO: 1 /HPF (ref 0–2)
SODIUM SERPL-SCNC: 140 MMOL/L (ref 133–144)
SOURCE: ABNORMAL
SP GR UR STRIP: 1.02 (ref 1–1.03)
SQUAMOUS #/AREA URNS AUTO: 1 /HPF (ref 0–1)
UROBILINOGEN UR STRIP-MCNC: NORMAL MG/DL (ref 0–2)
WBC # BLD AUTO: 9 10E9/L (ref 4–11)
WBC #/AREA URNS AUTO: 1 /HPF (ref 0–5)

## 2020-09-01 PROCEDURE — 80349 CANNABINOIDS NATURAL: CPT | Performed by: INTERNAL MEDICINE

## 2020-09-01 PROCEDURE — 85025 COMPLETE CBC W/AUTO DIFF WBC: CPT | Performed by: EMERGENCY MEDICINE

## 2020-09-01 PROCEDURE — 00000146 ZZHCL STATISTIC GLUCOSE BY METER IP

## 2020-09-01 PROCEDURE — 25000128 H RX IP 250 OP 636

## 2020-09-01 PROCEDURE — 80177 DRUG SCRN QUAN LEVETIRACETAM: CPT | Performed by: EMERGENCY MEDICINE

## 2020-09-01 PROCEDURE — 93010 ELECTROCARDIOGRAM REPORT: CPT | Mod: Z6 | Performed by: EMERGENCY MEDICINE

## 2020-09-01 PROCEDURE — 93005 ELECTROCARDIOGRAM TRACING: CPT | Performed by: EMERGENCY MEDICINE

## 2020-09-01 PROCEDURE — 80347 BENZODIAZEPINES 13 OR MORE: CPT | Performed by: INTERNAL MEDICINE

## 2020-09-01 PROCEDURE — 25800030 ZZH RX IP 258 OP 636: Performed by: EMERGENCY MEDICINE

## 2020-09-01 PROCEDURE — 80307 DRUG TEST PRSMV CHEM ANLYZR: CPT | Performed by: INTERNAL MEDICINE

## 2020-09-01 PROCEDURE — 25000128 H RX IP 250 OP 636: Performed by: EMERGENCY MEDICINE

## 2020-09-01 PROCEDURE — 83605 ASSAY OF LACTIC ACID: CPT | Performed by: FAMILY MEDICINE

## 2020-09-01 PROCEDURE — 81001 URINALYSIS AUTO W/SCOPE: CPT | Performed by: EMERGENCY MEDICINE

## 2020-09-01 PROCEDURE — 96375 TX/PRO/DX INJ NEW DRUG ADDON: CPT | Performed by: EMERGENCY MEDICINE

## 2020-09-01 PROCEDURE — 99285 EMERGENCY DEPT VISIT HI MDM: CPT | Mod: 25 | Performed by: EMERGENCY MEDICINE

## 2020-09-01 PROCEDURE — 80048 BASIC METABOLIC PNL TOTAL CA: CPT | Performed by: EMERGENCY MEDICINE

## 2020-09-01 PROCEDURE — 99291 CRITICAL CARE FIRST HOUR: CPT | Mod: 25 | Performed by: EMERGENCY MEDICINE

## 2020-09-01 PROCEDURE — 96376 TX/PRO/DX INJ SAME DRUG ADON: CPT | Performed by: EMERGENCY MEDICINE

## 2020-09-01 PROCEDURE — 96374 THER/PROPH/DIAG INJ IV PUSH: CPT | Performed by: EMERGENCY MEDICINE

## 2020-09-01 RX ORDER — LEVETIRACETAM 500 MG/1
1000 TABLET ORAL 2 TIMES DAILY
Status: ON HOLD | COMMUNITY
End: 2020-09-02

## 2020-09-01 RX ORDER — LORAZEPAM 2 MG/ML
INJECTION INTRAMUSCULAR
Status: COMPLETED
Start: 2020-09-01 | End: 2020-09-01

## 2020-09-01 RX ORDER — LEVETIRACETAM 100 MG/ML
2000 SOLUTION ORAL ONCE
Status: DISCONTINUED | OUTPATIENT
Start: 2020-09-01 | End: 2020-09-01

## 2020-09-01 RX ORDER — LORAZEPAM 2 MG/ML
2 INJECTION INTRAMUSCULAR ONCE
Status: DISCONTINUED | OUTPATIENT
Start: 2020-09-01 | End: 2020-09-02

## 2020-09-01 RX ADMIN — LORAZEPAM 2 MG: 2 INJECTION INTRAMUSCULAR; INTRAVENOUS at 21:58

## 2020-09-01 RX ADMIN — LEVETIRACETAM 2000 MG: 100 INJECTION, SOLUTION INTRAVENOUS at 22:31

## 2020-09-01 ASSESSMENT — ENCOUNTER SYMPTOMS
NAUSEA: 0
BRUISES/BLEEDS EASILY: 0
ARTHRALGIAS: 0
POLYDIPSIA: 0
ADENOPATHY: 0
CHILLS: 0
NECK STIFFNESS: 0
SHORTNESS OF BREATH: 0
FEVER: 0
CONFUSION: 0
HEADACHES: 0
SEIZURES: 1
VOMITING: 0
COLOR CHANGE: 0
DIFFICULTY URINATING: 0
ABDOMINAL PAIN: 0
EYE REDNESS: 0

## 2020-09-02 ENCOUNTER — APPOINTMENT (OUTPATIENT)
Dept: GENERAL RADIOLOGY | Facility: CLINIC | Age: 25
End: 2020-09-02
Attending: INTERNAL MEDICINE
Payer: MEDICAID

## 2020-09-02 ENCOUNTER — PATIENT OUTREACH (OUTPATIENT)
Dept: CARE COORDINATION | Facility: CLINIC | Age: 25
End: 2020-09-02

## 2020-09-02 VITALS
WEIGHT: 187.3 LBS | RESPIRATION RATE: 20 BRPM | HEART RATE: 101 BPM | OXYGEN SATURATION: 96 % | BODY MASS INDEX: 28.48 KG/M2 | DIASTOLIC BLOOD PRESSURE: 64 MMHG | SYSTOLIC BLOOD PRESSURE: 117 MMHG | TEMPERATURE: 97.7 F

## 2020-09-02 PROBLEM — R56.9 SEIZURES (H): Status: ACTIVE | Noted: 2020-09-02

## 2020-09-02 PROBLEM — R56.9 SEIZURE (H): Status: ACTIVE | Noted: 2020-09-02

## 2020-09-02 LAB
ALBUMIN SERPL-MCNC: 3.4 G/DL (ref 3.4–5)
ALP SERPL-CCNC: 36 U/L (ref 40–150)
ALT SERPL W P-5'-P-CCNC: 21 U/L (ref 0–50)
ANION GAP SERPL CALCULATED.3IONS-SCNC: 6 MMOL/L (ref 3–14)
AST SERPL W P-5'-P-CCNC: 16 U/L (ref 0–45)
BASOPHILS # BLD AUTO: 0.1 10E9/L (ref 0–0.2)
BASOPHILS NFR BLD AUTO: 0.5 %
BILIRUB SERPL-MCNC: 0.3 MG/DL (ref 0.2–1.3)
BUN SERPL-MCNC: 12 MG/DL (ref 7–30)
CALCIUM SERPL-MCNC: 8.4 MG/DL (ref 8.5–10.1)
CHLORIDE SERPL-SCNC: 111 MMOL/L (ref 94–109)
CO2 SERPL-SCNC: 23 MMOL/L (ref 20–32)
CREAT SERPL-MCNC: 0.7 MG/DL (ref 0.52–1.04)
DIFFERENTIAL METHOD BLD: NORMAL
EOSINOPHIL # BLD AUTO: 0.1 10E9/L (ref 0–0.7)
EOSINOPHIL NFR BLD AUTO: 0.5 %
ERYTHROCYTE [DISTWIDTH] IN BLOOD BY AUTOMATED COUNT: 11.8 % (ref 10–15)
GFR SERPL CREATININE-BSD FRML MDRD: >90 ML/MIN/{1.73_M2}
GLUCOSE BLDC GLUCOMTR-MCNC: 113 MG/DL (ref 70–99)
GLUCOSE SERPL-MCNC: 106 MG/DL (ref 70–99)
HCT VFR BLD AUTO: 38.1 % (ref 35–47)
HGB BLD-MCNC: 12.4 G/DL (ref 11.7–15.7)
IMM GRANULOCYTES # BLD: 0 10E9/L (ref 0–0.4)
IMM GRANULOCYTES NFR BLD: 0.4 %
INTERPRETATION ECG - MUSE: NORMAL
INTERPRETATION ECG - MUSE: NORMAL
LACTATE BLD-SCNC: 0.6 MMOL/L (ref 0.7–2)
LYMPHOCYTES # BLD AUTO: 1.7 10E9/L (ref 0.8–5.3)
LYMPHOCYTES NFR BLD AUTO: 16 %
MCH RBC QN AUTO: 27.9 PG (ref 26.5–33)
MCHC RBC AUTO-ENTMCNC: 32.5 G/DL (ref 31.5–36.5)
MCV RBC AUTO: 86 FL (ref 78–100)
MONOCYTES # BLD AUTO: 0.5 10E9/L (ref 0–1.3)
MONOCYTES NFR BLD AUTO: 5 %
NEUTROPHILS # BLD AUTO: 8.2 10E9/L (ref 1.6–8.3)
NEUTROPHILS NFR BLD AUTO: 77.6 %
NRBC # BLD AUTO: 0 10*3/UL
NRBC BLD AUTO-RTO: 0 /100
PLATELET # BLD AUTO: 292 10E9/L (ref 150–450)
POTASSIUM SERPL-SCNC: 3.3 MMOL/L (ref 3.4–5.3)
PROCALCITONIN SERPL-MCNC: <0.05 NG/ML
PROT SERPL-MCNC: 6.9 G/DL (ref 6.8–8.8)
RBC # BLD AUTO: 4.44 10E12/L (ref 3.8–5.2)
SARS-COV-2 RNA SPEC QL NAA+PROBE: NOT DETECTED
SODIUM SERPL-SCNC: 140 MMOL/L (ref 133–144)
SPECIMEN SOURCE: NORMAL
WBC # BLD AUTO: 10.5 10E9/L (ref 4–11)

## 2020-09-02 PROCEDURE — G0378 HOSPITAL OBSERVATION PER HR: HCPCS

## 2020-09-02 PROCEDURE — 71045 X-RAY EXAM CHEST 1 VIEW: CPT

## 2020-09-02 PROCEDURE — 36415 COLL VENOUS BLD VENIPUNCTURE: CPT | Performed by: STUDENT IN AN ORGANIZED HEALTH CARE EDUCATION/TRAINING PROGRAM

## 2020-09-02 PROCEDURE — 80177 DRUG SCRN QUAN LEVETIRACETAM: CPT | Performed by: INTERNAL MEDICINE

## 2020-09-02 PROCEDURE — 00000146 ZZHCL STATISTIC GLUCOSE BY METER IP

## 2020-09-02 PROCEDURE — 93010 ELECTROCARDIOGRAM REPORT: CPT | Performed by: INTERNAL MEDICINE

## 2020-09-02 PROCEDURE — 93005 ELECTROCARDIOGRAM TRACING: CPT

## 2020-09-02 PROCEDURE — 25000128 H RX IP 250 OP 636: Performed by: INTERNAL MEDICINE

## 2020-09-02 PROCEDURE — 36415 COLL VENOUS BLD VENIPUNCTURE: CPT | Performed by: INTERNAL MEDICINE

## 2020-09-02 PROCEDURE — 25800030 ZZH RX IP 258 OP 636: Performed by: INTERNAL MEDICINE

## 2020-09-02 PROCEDURE — 80053 COMPREHEN METABOLIC PANEL: CPT | Performed by: INTERNAL MEDICINE

## 2020-09-02 PROCEDURE — 25000132 ZZH RX MED GY IP 250 OP 250 PS 637: Performed by: INTERNAL MEDICINE

## 2020-09-02 PROCEDURE — 85025 COMPLETE CBC W/AUTO DIFF WBC: CPT | Performed by: INTERNAL MEDICINE

## 2020-09-02 PROCEDURE — 80177 DRUG SCRN QUAN LEVETIRACETAM: CPT | Performed by: STUDENT IN AN ORGANIZED HEALTH CARE EDUCATION/TRAINING PROGRAM

## 2020-09-02 PROCEDURE — 84145 PROCALCITONIN (PCT): CPT | Performed by: INTERNAL MEDICINE

## 2020-09-02 PROCEDURE — 83605 ASSAY OF LACTIC ACID: CPT | Performed by: STUDENT IN AN ORGANIZED HEALTH CARE EDUCATION/TRAINING PROGRAM

## 2020-09-02 PROCEDURE — U0003 INFECTIOUS AGENT DETECTION BY NUCLEIC ACID (DNA OR RNA); SEVERE ACUTE RESPIRATORY SYNDROME CORONAVIRUS 2 (SARS-COV-2) (CORONAVIRUS DISEASE [COVID-19]), AMPLIFIED PROBE TECHNIQUE, MAKING USE OF HIGH THROUGHPUT TECHNOLOGIES AS DESCRIBED BY CMS-2020-01-R: HCPCS | Performed by: INTERNAL MEDICINE

## 2020-09-02 RX ORDER — LEVETIRACETAM 500 MG/1
1500 TABLET ORAL 2 TIMES DAILY
Qty: 30 TABLET | Refills: 3 | Status: SHIPPED | OUTPATIENT
Start: 2020-09-02 | End: 2021-06-18 | Stop reason: ALTCHOICE

## 2020-09-02 RX ORDER — MAGNESIUM SULFATE HEPTAHYDRATE 40 MG/ML
2 INJECTION, SOLUTION INTRAVENOUS DAILY PRN
Status: DISCONTINUED | OUTPATIENT
Start: 2020-09-02 | End: 2020-09-02 | Stop reason: HOSPADM

## 2020-09-02 RX ORDER — LEVETIRACETAM 500 MG/1
1500 TABLET ORAL 2 TIMES DAILY
Qty: 30 TABLET | Refills: 3 | Status: SHIPPED | OUTPATIENT
Start: 2020-09-02 | End: 2020-09-02

## 2020-09-02 RX ORDER — ESTRADIOL 2 MG/1
2 TABLET ORAL 2 TIMES DAILY
Status: DISCONTINUED | OUTPATIENT
Start: 2020-09-02 | End: 2020-09-02 | Stop reason: HOSPADM

## 2020-09-02 RX ORDER — LANOLIN ALCOHOL/MO/W.PET/CERES
3 CREAM (GRAM) TOPICAL
Status: DISCONTINUED | OUTPATIENT
Start: 2020-09-02 | End: 2020-09-02 | Stop reason: HOSPADM

## 2020-09-02 RX ORDER — LEVETIRACETAM 500 MG/1
1000 TABLET ORAL 2 TIMES DAILY
Status: DISCONTINUED | OUTPATIENT
Start: 2020-09-02 | End: 2020-09-02 | Stop reason: HOSPADM

## 2020-09-02 RX ORDER — POTASSIUM CHLORIDE 29.8 MG/ML
20 INJECTION INTRAVENOUS
Status: DISCONTINUED | OUTPATIENT
Start: 2020-09-02 | End: 2020-09-02 | Stop reason: HOSPADM

## 2020-09-02 RX ORDER — ONDANSETRON 4 MG/1
4 TABLET, ORALLY DISINTEGRATING ORAL EVERY 6 HOURS PRN
Status: DISCONTINUED | OUTPATIENT
Start: 2020-09-02 | End: 2020-09-02 | Stop reason: HOSPADM

## 2020-09-02 RX ORDER — POTASSIUM CL/LIDO/0.9 % NACL 10MEQ/0.1L
10 INTRAVENOUS SOLUTION, PIGGYBACK (ML) INTRAVENOUS
Status: DISCONTINUED | OUTPATIENT
Start: 2020-09-02 | End: 2020-09-02 | Stop reason: HOSPADM

## 2020-09-02 RX ORDER — ONDANSETRON 2 MG/ML
4 INJECTION INTRAMUSCULAR; INTRAVENOUS EVERY 6 HOURS PRN
Status: DISCONTINUED | OUTPATIENT
Start: 2020-09-02 | End: 2020-09-02 | Stop reason: HOSPADM

## 2020-09-02 RX ORDER — CALCIUM CARBONATE 500 MG/1
1000 TABLET, CHEWABLE ORAL 4 TIMES DAILY PRN
Status: DISCONTINUED | OUTPATIENT
Start: 2020-09-02 | End: 2020-09-02 | Stop reason: HOSPADM

## 2020-09-02 RX ORDER — LEVETIRACETAM 750 MG/1
750 TABLET ORAL 2 TIMES DAILY
OUTPATIENT
Start: 2020-09-02 | End: 2024-08-08

## 2020-09-02 RX ORDER — TRIAMCINOLONE ACETONIDE 1 MG/G
OINTMENT TOPICAL 2 TIMES DAILY
Status: DISCONTINUED | OUTPATIENT
Start: 2020-09-02 | End: 2020-09-02

## 2020-09-02 RX ORDER — SODIUM CHLORIDE 9 MG/ML
INJECTION, SOLUTION INTRAVENOUS CONTINUOUS
Status: DISCONTINUED | OUTPATIENT
Start: 2020-09-02 | End: 2020-09-02 | Stop reason: HOSPADM

## 2020-09-02 RX ORDER — POTASSIUM CHLORIDE 1.5 G/1.58G
20-40 POWDER, FOR SOLUTION ORAL
Status: DISCONTINUED | OUTPATIENT
Start: 2020-09-02 | End: 2020-09-02 | Stop reason: HOSPADM

## 2020-09-02 RX ORDER — LEVETIRACETAM 15 MG/ML
1500 INJECTION INTRAVASCULAR ONCE
Status: COMPLETED | OUTPATIENT
Start: 2020-09-02 | End: 2020-09-02

## 2020-09-02 RX ORDER — ACETAMINOPHEN 325 MG/1
650 TABLET ORAL EVERY 4 HOURS PRN
Status: DISCONTINUED | OUTPATIENT
Start: 2020-09-02 | End: 2020-09-02 | Stop reason: HOSPADM

## 2020-09-02 RX ORDER — LIDOCAINE 40 MG/G
CREAM TOPICAL
Status: DISCONTINUED | OUTPATIENT
Start: 2020-09-02 | End: 2020-09-02 | Stop reason: HOSPADM

## 2020-09-02 RX ORDER — POTASSIUM CHLORIDE 750 MG/1
20-40 TABLET, EXTENDED RELEASE ORAL
Status: DISCONTINUED | OUTPATIENT
Start: 2020-09-02 | End: 2020-09-02 | Stop reason: HOSPADM

## 2020-09-02 RX ORDER — AMOXICILLIN 250 MG
1 CAPSULE ORAL 2 TIMES DAILY PRN
Status: DISCONTINUED | OUTPATIENT
Start: 2020-09-02 | End: 2020-09-02 | Stop reason: HOSPADM

## 2020-09-02 RX ORDER — ACETAMINOPHEN 650 MG/1
650 SUPPOSITORY RECTAL EVERY 4 HOURS PRN
Status: DISCONTINUED | OUTPATIENT
Start: 2020-09-02 | End: 2020-09-02 | Stop reason: HOSPADM

## 2020-09-02 RX ORDER — IBUPROFEN 600 MG/1
600 TABLET, FILM COATED ORAL EVERY 6 HOURS PRN
Status: DISCONTINUED | OUTPATIENT
Start: 2020-09-02 | End: 2020-09-02 | Stop reason: HOSPADM

## 2020-09-02 RX ORDER — NALOXONE HYDROCHLORIDE 0.4 MG/ML
.1-.4 INJECTION, SOLUTION INTRAMUSCULAR; INTRAVENOUS; SUBCUTANEOUS
Status: DISCONTINUED | OUTPATIENT
Start: 2020-09-02 | End: 2020-09-02 | Stop reason: HOSPADM

## 2020-09-02 RX ORDER — POLYETHYLENE GLYCOL 3350 17 G
2-4 POWDER IN PACKET (EA) ORAL
Status: DISCONTINUED | OUTPATIENT
Start: 2020-09-02 | End: 2020-09-02 | Stop reason: HOSPADM

## 2020-09-02 RX ORDER — AMOXICILLIN 250 MG
2 CAPSULE ORAL 2 TIMES DAILY PRN
Status: DISCONTINUED | OUTPATIENT
Start: 2020-09-02 | End: 2020-09-02 | Stop reason: HOSPADM

## 2020-09-02 RX ORDER — POTASSIUM CHLORIDE 7.45 MG/ML
10 INJECTION INTRAVENOUS
Status: DISCONTINUED | OUTPATIENT
Start: 2020-09-02 | End: 2020-09-02 | Stop reason: HOSPADM

## 2020-09-02 RX ORDER — MAGNESIUM SULFATE HEPTAHYDRATE 40 MG/ML
4 INJECTION, SOLUTION INTRAVENOUS EVERY 4 HOURS PRN
Status: DISCONTINUED | OUTPATIENT
Start: 2020-09-02 | End: 2020-09-02 | Stop reason: HOSPADM

## 2020-09-02 RX ORDER — POLYETHYLENE GLYCOL 3350 17 G/17G
17 POWDER, FOR SOLUTION ORAL DAILY PRN
Status: DISCONTINUED | OUTPATIENT
Start: 2020-09-02 | End: 2020-09-02 | Stop reason: HOSPADM

## 2020-09-02 RX ORDER — LORAZEPAM 2 MG/ML
2 INJECTION INTRAMUSCULAR
Status: DISCONTINUED | OUTPATIENT
Start: 2020-09-02 | End: 2020-09-02 | Stop reason: HOSPADM

## 2020-09-02 RX ADMIN — LEVETIRACETAM 1000 MG: 500 TABLET, FILM COATED ORAL at 13:29

## 2020-09-02 RX ADMIN — LEVETIRACETAM 1500 MG: 15 INJECTION INTRAVENOUS at 04:20

## 2020-09-02 RX ADMIN — LORAZEPAM 2 MG: 2 INJECTION INTRAMUSCULAR; INTRAVENOUS at 02:41

## 2020-09-02 RX ADMIN — SODIUM CHLORIDE: 9 INJECTION, SOLUTION INTRAVENOUS at 01:28

## 2020-09-02 RX ADMIN — SODIUM CHLORIDE: 9 INJECTION, SOLUTION INTRAVENOUS at 11:57

## 2020-09-02 RX ADMIN — ESTRADIOL 2 MG: 2 TABLET ORAL at 08:59

## 2020-09-02 RX ADMIN — POTASSIUM CHLORIDE 40 MEQ: 750 TABLET, EXTENDED RELEASE ORAL at 13:51

## 2020-09-02 ASSESSMENT — ACTIVITIES OF DAILY LIVING (ADL)
ADLS_ACUITY_SCORE: 18
ADLS_ACUITY_SCORE: 18

## 2020-09-02 ASSESSMENT — VISUAL ACUITY
OU: NORMAL ACUITY
OU: NORMAL ACUITY

## 2020-09-02 NOTE — DISCHARGE SUMMARY
[facility]  NEUROLOGY DISCHARGE SUMMARY    Patient Name:  Kun Florian  MRN:  [id number]      :  1995      Date of Admission:  2020  Date of Discharge:  2020  Admitting Physician:  Sunny Ornelas MD  Discharge Physician:  Sunny Ornelas MD  Primary Care Provider:   Josie Macias  Discharge Disposition:   Discharged to home    Admission Diagnoses:  #Seizure history   #Breakthrough seizure  #Transgender on Hormone replacement therapy  #Skin rash    Discharge Diagnoses:    #Seizure history   #Breakthrough seizure  #Transgender on Hormone replacement therapy  #Skin rash    Brief History of Illness:   25 year old woman brought in by family due to 2 witnessed seizures while at home.  First one lasted 3 to 4 minutes and the second approximately 2 minutes.  Patient was sitting and watching television during the first and lying in bed during the second.  No fall or head injury.  Patient states she feels tired at this time.  Denies any recent illness or fevers.  No cough, chest pain, shortness of breath.  Patient is on Keppra 2000 mg, daily, without any recent dose changes.     Hospital Course:  #Seizure history  #Breakthrough seizure, history of non-compliance (now off vimpat for financial reasons)   Presented with 3 seizure, had another one after loading dose of Keppra, reports medication non compliance in the past week, Pt was monitored for 12 hours after the last seizure, remained stable and back to basleline, Keppra dose was increased to 1500 mg BID on discharge. Given that patient doesn't have insurance, 30 days supply was provided on discharge, pt is supposed to apply for health care insurance. [place]. Bullard [place] [facility] [place], last was seen in late may via Telemed, we recommend follow up with her in 4 weeks and also as she has never had MRI, we recommend Brain MRI if breakthrough seizures continue happening.  - Urine tox pending  - Keppra level  pending     #Hormone replacement  -continue PTA     #rash  -continue PTA topical    Recommendations and Follow-up:  - Follow up with Neurologist Dr. Bullard in 4-8 weeks  - Start taking Keppra 1500 BID    Discharge physical examination:   /64 (BP Location: Right arm)   Pulse 101   Temp 97.7  F (36.5  C) (Axillary)   Resp 20   Wt 85 kg (187 lb 4.8 oz)   SpO2 96%   Breastfeeding No   BMI 28.48 kg/m    Physical Examination   Vitals: /64 (BP Location: Right arm)   Pulse 101   Temp 97.7  F (36.5  C) (Axillary)   Resp 20   Wt 85 kg (187 lb 4.8 oz)   SpO2 96%   Breastfeeding No   BMI 28.48 kg/m    General: , lying in bed, NAD  HEENT: NC/AT, no icterus, op pink and moist  Cardiac: RRR  Chest: non-labored on RA  Abdomen: S/NT/ND  Extremities: Warm, no edema  Skin: No rash or lesion   Psych: Mood pleasant, affect congruent    Neuro:  Mental status: Lethargic, oriented to self, time, place, and circumstance. Language is fluent  Cranial nerves: PERRL, conjugate gaze, EOMI, facial sensation intact, face symmetric, shoulder shrug strong, tongue/uvula midline, no dysarthria.   Motor: Normal bulk and tone. No abnormal movements. 5/5 strength in 4/4 extremities.   Reflexes: hyporeflexic and symmetric biceps, brachioradialis, triceps, patellae, and achilles.   Sensory: Intact to light touch x4  Coordination: FNF and HS without ataxia or dysmetria. Rapid alternating movements intact.   Gait: Normal width, stride length, turn, and arm swing.     Discharge Medications:  Discharge Medication List as of 9/2/2020  3:23 PM      CONTINUE these medications which have CHANGED    Details   levETIRAcetam (KEPPRA) 500 MG tablet Take 3 tablets (1,500 mg) by mouth 2 times daily, Disp-30 tablet,R-3, Local Print         CONTINUE these medications which have NOT CHANGED    Details   estradiol (ESTRACE) 2 MG tablet Take 1 tablet (2 mg) by mouth 2 times daily, Disp-60 tablet,R-0, E-Prescribe           Discharge follow up and  instructions:     Reason for your hospital stay    Observation after onset of seizure and medication managment     Follow Up and recommended labs and tests    [place] [facility] [place]     Activity    Your activity upon discharge: activity as tolerated     Discharge Instructions    I reviewed [place] regulations on seizures and driving with the patient and they appeared to clearly understand that they are prohibited from operating a motor vehicle for 3 months following any seizure. I also recommended they avoid any activities that might lead to self-injury or injury of others for 3 months following any seizure with impaired awareness or motor control like holding young children at heights from which they might be injured if dropped, avoiding situations in which they or someone else might drown without their continuous awareness, and operating power tools, firearms, and other high-powered devices.     Full Code     Diet    Follow this diet upon discharge: Orders Placed This Encounter      Advance Diet as Tolerated: Regular Diet Adult       Patient seen and discussed with Dr. Ornelas.    Wilver Flynn MD  Neurology Resident PGY2  Pager [phone]       I saw and evaluated the patient on 9/2/20 prior to discharge.  I discussed the patient with the resident and agree with the plan of care as documented in resident's note.      Patient presented for multiple breakthrough seizures.  Admits to missing a few doses of Keppra in the past week, although unclear exactly how many doses.  There is concern for noncompliance based on pill counts in chart.  Keppra level still pending.  Received Keppra load and was monitored for 12 hours without recurrence of seizure.  We will plan on increasing Keppra dose for now.  If Keppra level comes back low, likely can go back to previous dose with additional education regarding compliance.  If has further seizures on this dose and confirmed compliant would  need to start a second agent.   Patient will follow-up with her previously seen [facility] doctor.   Also will need MRI as outpatient as part of her seizure work-up.    I personally reviewed relevant vital signs, medications, labs and imaging.      I Personally spent 15 minutes on discharge activities.      Sunny Ornelas DO   of Neurology

## 2020-09-02 NOTE — ED PROVIDER NOTES
[facility] EMERGENCY DEPARTMENT ([facility])  9/01/20    History     Chief Complaint   Patient presents with     Seizures     2 seizures witness by girlfriend. First seizure around 1400 and lasted 3-4 min. Second seizure lasted about 2 min. Patient felt in coming on and does have a hx of seizures.      HPI  Kun Florian is a 25 year old woman brought in by family due to 2 witnessed seizures while at home.  First one lasted 3 to 4 minutes and the second approximately 2 minutes.  Patient was sitting and watching television during the first and lying in bed during the second.  No fall or head injury.  Patient states she feels tired at this time.  Denies any recent illness or fevers.  No cough, chest pain, shortness of breath.  Patient is on Keppra 2000 mg, daily, without any recent dose changes.  No other concerns or complaints.      Past Medical History:   Diagnosis Date     Anxiety      Asthma      Depression      Epilepsy (H)      Sleep apnea        Past Surgical History:   Procedure Laterality Date     C EACH ADD TOOTH EXTRACTION       ORCHIECTOMY SCROTAL Bilateral 11/22/2019    Procedure: BILATERAL SIMPLE SCROTAL ORCHIECTOMY;  Surgeon: Toribio Morales MD;  Location: [facility] OR     TONSILLECTOMY      10 years old       Family History   Problem Relation Age of Onset     Breast Cancer Mother      Bleeding Disorder Father        Social History     Tobacco Use     Smoking status: Never Smoker     Smokeless tobacco: Never Used   Substance Use Topics     Alcohol use: Yes     Comment: occasional, socially     Current Facility-Administered Medications   Medication     LORazepam (ATIVAN) injection 2 mg     Current Outpatient Medications   Medication     estradiol (ESTRACE) 2 MG tablet     levETIRAcetam (KEPPRA) 500 MG tablet     triamcinolone (KENALOG) 0.1 % external ointment        Allergies   Allergen Reactions     Codeine Hives     Difficulty breathing, throat swelling..Happen approximately 14 years ago       Nickel Rash        Review of Systems   Constitutional: Negative for chills and fever.   HENT: Negative for congestion.    Eyes: Negative for redness.   Respiratory: Negative for shortness of breath.    Cardiovascular: Negative for chest pain.   Gastrointestinal: Negative for abdominal pain, nausea and vomiting.   Endocrine: Negative for polydipsia and polyuria.   Genitourinary: Negative for difficulty urinating.   Musculoskeletal: Negative for arthralgias and neck stiffness.   Skin: Negative for color change.   Allergic/Immunologic: Negative for immunocompromised state.   Neurological: Positive for seizures. Negative for headaches.   Hematological: Negative for adenopathy. Does not bruise/bleed easily.   Psychiatric/Behavioral: Negative for confusion.   All other systems reviewed and are negative.    A complete review of systems was performed with pertinent positives and negatives noted in the HPI, and all other systems negative.    Physical Exam   BP: 104/65  Pulse: 98  Temp: 98.1  F (36.7  C)  Resp: 16  SpO2: 97 %  Physical Exam  Vitals signs and nursing note reviewed.   Constitutional:       General: She is not in acute distress.     Appearance: Normal appearance. She is normal weight. She is not diaphoretic.      Comments: Patient is awake, alert, but sleepy.   HENT:      Head: Normocephalic and atraumatic.      Nose: Nose normal.      Mouth/Throat:      Mouth: Mucous membranes are moist.      Pharynx: No oropharyngeal exudate.      Comments: Slight tongue abrasion on right  Eyes:      General: No scleral icterus.     Extraocular Movements: Extraocular movements intact.      Conjunctiva/sclera: Conjunctivae normal.   Neck:      Musculoskeletal: Normal range of motion and neck supple. No muscular tenderness.   Cardiovascular:      Rate and Rhythm: Normal rate.      Pulses: Normal pulses.      Heart sounds: Normal heart sounds.   Pulmonary:      Effort: Pulmonary effort is normal. No respiratory distress.       Breath sounds: Normal breath sounds. No wheezing, rhonchi or rales.   Abdominal:      Palpations: Abdomen is soft.      Tenderness: There is no abdominal tenderness. There is no guarding or rebound.   Musculoskeletal: Normal range of motion.         General: No tenderness.   Skin:     General: Skin is warm.      Capillary Refill: Capillary refill takes less than 2 seconds.      Findings: No rash.   Neurological:      General: No focal deficit present.      Mental Status: She is alert and oriented to person, place, and time.      GCS: GCS eye subscore is 4. GCS verbal subscore is 5. GCS motor subscore is 6.      Cranial Nerves: Cranial nerves are intact. No cranial nerve deficit.      Sensory: Sensation is intact. No sensory deficit.      Motor: Motor function is intact. No weakness.      Coordination: Coordination is intact. Coordination normal.      Gait: Gait is intact. Gait normal.      Deep Tendon Reflexes: Reflexes normal.   Psychiatric:         Mood and Affect: Mood normal.         Behavior: Behavior normal.         Thought Content: Thought content normal.         Judgment: Judgment normal.         ED Course      Procedures             EKG Interpretation:      Interpreted by Herve Clemons MD  Time reviewed: 22:53  Symptoms at time of EKG: seizure   Rhythm: normal sinus   Rate: normal  Axis: normal  Ectopy: none  Conduction: normal  ST Segments/ T Waves: No ST-T wave changes  Q Waves: none  Comparison to prior: No old EKG available    Clinical Impression: normal EKG          Critical Care Addendum    My initial assessment, based on my review of nursing observations, review of vital signs, focused history, physical exam, review of cardiac rhythm monitor and 12 lead ECG analysis, established that Kun Florian has altered mental status, which requires immediate intervention, and therefore she is critically ill.     After the initial assessment, the care team initiated multiple lab tests, initiated medication  therapy with Intravenous lorazepam and intravenous Keppra and consulted with Dr. Ornelas from neurology service to provide stabilization care. Due to the critical nature of this patient, I reassessed nursing observations, vital signs, physical exam, interpretation of Laboratory studies, mental status, neurologic status and respiratory status multiple times prior to her disposition.     Time also spent performing documentation, reviewing test results, discussion with consultants and coordination of care.     Critical care time (excluding teaching time and procedures): 45 minutes.            Results for orders placed or performed during the hospital encounter of 09/01/20   CBC with platelets differential     Status: None   Result Value Ref Range    WBC 9.0 4.0 - 11.0 10e9/L    RBC Count 4.32 3.8 - 5.2 10e12/L    Hemoglobin 12.4 11.7 - 15.7 g/dL    Hematocrit 36.9 35.0 - 47.0 %    MCV 85 78 - 100 fl    MCH 28.7 26.5 - 33.0 pg    MCHC 33.6 31.5 - 36.5 g/dL    RDW 11.9 10.0 - 15.0 %    Platelet Count 276 150 - 450 10e9/L    Diff Method Automated Method     % Neutrophils 66.1 %    % Lymphocytes 20.7 %    % Monocytes 10.8 %    % Eosinophils 1.9 %    % Basophils 0.3 %    % Immature Granulocytes 0.2 %    Nucleated RBCs 0 0 /100    Absolute Neutrophil 5.9 1.6 - 8.3 10e9/L    Absolute Lymphocytes 1.9 0.8 - 5.3 10e9/L    Absolute Monocytes 1.0 0.0 - 1.3 10e9/L    Absolute Eosinophils 0.2 0.0 - 0.7 10e9/L    Absolute Basophils 0.0 0.0 - 0.2 10e9/L    Abs Immature Granulocytes 0.0 0 - 0.4 10e9/L    Absolute Nucleated RBC 0.0    Basic metabolic panel     Status: Abnormal   Result Value Ref Range    Sodium 140 133 - 144 mmol/L    Potassium 3.6 3.4 - 5.3 mmol/L    Chloride 106 94 - 109 mmol/L    Carbon Dioxide 27 20 - 32 mmol/L    Anion Gap 7 3 - 14 mmol/L    Glucose 105 (H) 70 - 99 mg/dL    Urea Nitrogen 12 7 - 30 mg/dL    Creatinine 0.66 0.52 - 1.04 mg/dL    GFR Estimate >90 >60 mL/min/[1.73_m2]    GFR Estimate If Black >90 >60  mL/min/[1.73_m2]    Calcium 8.4 (L) 8.5 - 10.1 mg/dL   UA with Microscopic     Status: Abnormal   Result Value Ref Range    Color Urine Light Yellow     Appearance Urine Clear     Glucose Urine Negative NEG^Negative mg/dL    Bilirubin Urine Negative NEG^Negative    Ketones Urine 5 (A) NEG^Negative mg/dL    Specific Gravity Urine 1.016 1.003 - 1.035    Blood Urine Negative NEG^Negative    pH Urine 5.5 5.0 - 7.0 pH    Protein Albumin Urine Negative NEG^Negative mg/dL    Urobilinogen mg/dL Normal 0.0 - 2.0 mg/dL    Nitrite Urine Negative NEG^Negative    Leukocyte Esterase Urine Negative NEG^Negative    Source Unspecified Urine     WBC Urine 1 0 - 5 /HPF    RBC Urine 1 0 - 2 /HPF    Squamous Epithelial /HPF Urine 1 0 - 1 /HPF    Mucous Urine Present (A) NEG^Negative /LPF   Glucose by meter     Status: Abnormal   Result Value Ref Range    Glucose 106 (H) 70 - 99 mg/dL   Lactic acid whole blood     Status: Abnormal   Result Value Ref Range    Lactic Acid 3.3 (H) 0.7 - 2.0 mmol/L   EKG 12-lead, complete     Status: None (Preliminary result)   Result Value Ref Range    Interpretation ECG Click View Image link to view waveform and result      Medications   LORazepam (ATIVAN) injection 2 mg (2 mg Intravenous Not Given 9/1/20 2244)   LORazepam (ATIVAN) 2 MG/ML injection (2 mg Intravenous Given 9/1/20 2158)   levETIRAcetam (KEPPRA) 2,000 mg in sodium chloride 0.9 % 250 mL intermittent infusion (2,000 mg Intravenous Given 9/1/20 2231)        Assessments & Plan (with Medical Decision Making)   Assessment and plan 25-year-old woman presenting after 2 seizures at home.  Differential diagnosis: breakthrough seizure, breakthrough seizures, electro abnormalities, subtherapeutic epileptic levels.    After the recent physical exam patient presenting no acute distress however, she does appear somewhat tired.  No evidence of head trauma.  I'll obtain laboratory studies for further diagnostic evaluation and consult neurology team.  Patient  family agree with the plan.    Addendum: I was called to the room and I witnessed the patient having generalized, tonic-clonic seizure with foaming from the mouth.  She did sustain an abrasion to her tongue during the seizure.  2 mg of IV Ativan were given.  The seizure lasted approximately 1 minute.  Patient was postictal subsequently.  I did order a dose of IV Keppra to be given in the emergency department.  I discussed her case with neurology staff, Dr. Ornelas and the plan was made for her to be admitted to neurology service.    Laboratory studies returned with no leukocytosis, WBC is normal at 9000.  There is no anemia, hemoglobin is normal at 12.4.  Electrolytes show no evidence of dehydration, creatinine is normal 0.66.  Sodium is normal 140.  Glucose is normal 106.  Urinalysis shows no evidence of infection.  Patient agrees with the plan.    This part of the medical record was transcribed by Dre Cortés, Medical Scribe, from a dictation done by Herve Clemons MD.     I have reviewed the nursing notes. I have reviewed the findings, diagnosis, plan and need for follow up with the patient.    New Prescriptions    No medications on file       Final diagnoses:   Other generalized epilepsy, not intractable, without status epilepticus (H)     I was physically present and have reviewed and verified the accuracy of this note documented by my scribe.    Herve Clemons MD      --  Herve Clemons MD  [facility], [place], EMERGENCY DEPARTMENT  9/1/2020     Herve Clemons MD  09/01/20 2510

## 2020-09-02 NOTE — PROGRESS NOTES
Arrived from:  [facility] ED  Belongings/meds:  Remain with patient  2 RN Skin Assessment Completed by: Dea LEVINE RN and Igancio GODOY RN   Non-intact findings documented (yes/no/NA): Yes, rash on abdomen and lower back with scabbing. Scabs throughout extremities

## 2020-09-02 NOTE — ED NOTES
Pt girlfriend yelled out that pt was having a seizure. Started approximately 2152 and was actively seizing by thrashing arms about. This lasted approximately 3 minutes. Pt became delerious, wanting to get out of bed, unable to fully communicate. MD notified, IV ativan administered, transferred rooms. Currently appears to be sleeping in room

## 2020-09-02 NOTE — PLAN OF CARE
OBSERVATION GOALS  1. Return to baseline mental status - yes, just lethargic  2. No seizures for at least 12 hours- no last seizure at 0200  3. Stable vital signs. - yes

## 2020-09-02 NOTE — PROGRESS NOTES
Patient discharged home today at 1655. Patient is stable and medically ready for discharge. Patient was sent home with all belongings and discharge medications were filled and sent with patient. Discharge paperwork was given to and discussed with patient. Patient verbalized understanding of discharge instructions and follow up appointments. Will continue to monitor.

## 2020-09-02 NOTE — PROGRESS NOTES
BELLA received a call from Lucero Mayes [employer], ([id number]) who states that she followed up with pt in regards to no insurance. Lucero states that pt is employed (at current employer x1 month).  Lucero states that pt is not eligible for health care insurance through her employer until she has been at her job for 2 months. Lucero states that pt's monthly income exceeds income guidelines for [place] [facility] [employer] [facility].  BELLA updated Dr. Flynn.  Dr. Flynn states that she contacted the discharge pharmacy and they will send pt home with a 30 day supply of medication.    EFREM Espinal  Social Work, 6A  Phone:  [phone]  Pager:  [phone]  9/2/2020

## 2020-09-02 NOTE — PHARMACY-ADMISSION MEDICATION HISTORY
Admission medication history interview status for the 9/1/2020 admission is complete.     See Epic admission navigator for allergy information, pharmacy, prior to admission medications and immunization status.     Medication history interview sources:  Patient/Chart Review     Additional medication history information: None       Prior to Admission medications    Medication Sig Last Dose Taking? Auth Provider   estradiol (ESTRACE) 2 MG tablet Take 1 tablet (2 mg) by mouth 2 times daily 9/1/2020 Yes Josie Macias MD   levETIRAcetam (KEPPRA) 500 MG tablet Take 1,000 mg by mouth 2 times daily  9/1/2020 Yes Reported, Patient     Medication history completed by: Zayda Nino, PharmD, BCPS

## 2020-09-02 NOTE — ED NOTES
Hand off given to Destiny Pierre, When EMS arrived a rash was noted on Pts abd. Umbilical area.  Pin point areas, scabbed over.  No other areas noted.

## 2020-09-02 NOTE — H&P
[facility]  Neurology Seizure HP     Patient Name:  Kun Florian  MRN:  [id number]    :  1995  Date of Service:  2020  Primary care provider:  Josie Macias          I saw and evaluated the patient on 20 and agree with the findings and the plan of care as documented in the resident's note.      25-year-old with history of seizures on Keppra with concern for noncompliance in the past presents with 3 seizures yesterday.  Last seizure shortly after loading with 2 g of Keppra, so received additional 1.5 g of Keppra.  All seizures described as generalized tonic-clonic.   Patient reports that they have missed a few doses of medications in the past week.  They deny alcohol or drug use.  Denies recent traumatic brain injury.  Follows with Dr. Bullard at [facility] who is prescribing Keppra monotherapy but has noted concern for noncompliance in the past.   Work-up in the past included head CT which was unremarkable and EEG which showed presence of polyspike and wave discharges consistent with generalized epilepsy    On exam awake, alert, oriented x3, follows multistep commands without difficulty.  Comprehension intact.  No dysarthria.  No aphasia.  Cranial nerves II through XII are normal although does have a small right tongue bite.  No weakness noted on extremity strength testing.  Reflexes are hypoactive but symmetric.  Toes are downgoing.  No dysmetria.  Patient states tired and not willing to participate in gait examination.    I suspect these are breakthrough seizures in the setting of Keppra noncompliance.  Patient reports missing doses.  Keppra level is pending.  Patient is improving significantly from exam noted last night.  Will admit to observation and if stays seizure-free can likely discharge later this afternoon.  Also need to see ambulation status prior to discharging.  Plan will be to increase Keppra to 1500 mg twice daily.  Will need an MRI as an  outpatient.  If has another seizure despite Keppra loading will need to load with a second agent and consider MRI on an inpatient basis.       Sunny Ornelas DO   of Neurology                ASSESSMENT AND PLAN BY PROBLEM:  This is a 25 y.o. RH adult with history of seizures. Currently on keppra (total compliance unclear), previously on vimpat 100mg po bid, denied any side-effects to it in the past. EEG on 5/15/19 showed polyspike and wave discharges. She had 3 seizures today and has pending keppra level.  She needs to be on an AED. Could not afford vimpat in the past.  At her last visit with Dr. Bullard she was prescribed 1g BID keppra (an increase in the setting of low normal levels and an ED visit in may to  for seizure).  This will be continued.  Workup should of course include infectious/toxic/metabolic/substance.  Patient will need follow up with Dr. Bullard after this admission in the [facility].    Plan:  #Seizure history sees Dr. Bullard last seen in late May via telemed  #Breakthrough seizure, history of non-compliance (now off vimpat for financial reasons)   -CBC, BMP, LFTs, TSH, BTox, ETOH  -keppra level obtained in ED, pending  -CXR  -EKG  -keppra 1g BID per prior recommendations Dr. Bullard (received IV load in ED)  -ativan 2mg for any event lasting longer than 3mins    #Hormone replacement  -continue PTA    #rash  -continue PTA topical      FEN: ADAT, IVF, electrolyte protocols  SCDs padua 2  Tums for GI  Pain PRNs available  covid testing PENDING  Full code, assumed based off short interaction with patient who admittedly is somnolent    Patient was d/w staff Dr. Ornelas.      HISTORY OF PRESENT ILLNESS:       25 year old woman brought in by family due to 2 witnessed seizures while at home.  First one lasted 3 to 4 minutes and the second approximately 2 minutes.  Patient was sitting and watching television during the first and lying in bed during the second.  No fall or head  injury.  Patient states she feels tired at this time.  Denies any recent illness or fevers.  No cough, chest pain, shortness of breath.  Patient is on Keppra 2000 mg, daily, without any recent dose changes.     PRIOR SEIZURE HISTORY NADINE (PRIMARY NEUROLOGIST [facility])    25 y.o. RH adult here for evaluation of seizure. Last clinic visit was on 4/4/19 at which time she was to get a sleep-deprived EEG and stay on vimpat 200mg po bid. She was to follow-up in 3 weeks. Records from [place] were requested. She is late to her appointment today.    Records from [place] were not received. She hadn't followed up in 3 weeks as planned.     On 7/18/19, PA team informed us that vimpat is a covered medication with her insurance without PA needed. Recent fill had a $226 copay and drug is not a copay tier exception. She was referred to patient assistance program to assist with obtaining co-pay assistance.     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     She got information from insurance company (phone call) that her Vimpat will become a Tier 3 medication in 2020 and this means that she won't be able to afford it. She has 5 days left of the medication.     Had not been compliant. Skipped doses for a while. Taking vimpat 100mg twice a day instead of 200mg twice a day. She has been getting her prescriptions from [facility]. No seizures in the interim. Says that she has been compliant at least in the last few months. I see that she had been prescribed Vimpat 100mg tabs starting in July 2019 prescribed by different providers (Dr Garcia, Dr Apodaca, Dr Sen).     Previous seizure history reviewed  Has history of seizures. Previously lived in [place]; moved to [place] (1 month ago) to live with friends; her family is in [place]. She's establishing care here.     Seizures started in high school; she was told that sleep deprivation was triggering seizures. She was started on an unknown  "AED in high school. Last seizure was 2 years ago (after 2 missed doses of Vimpat). She has been taking vimpat 200mg po bid - may miss a dose once every 3-5 months, but otherwise compliant. She has been on Vimpat 10 years.     Triggers: sleep deprivation, missed AED doses, too much sugar    Semiology: \"grand mal.\" She'll black out. Falls on the ground and shakes. No incontinence. No emesis. Has bitten the middle part of her tongue in some of her episodes and would bleed. Would have no energy upon awakening; tongue would be in pain. Has injured herself with bruises. No one has ever recorded an event.    Duration: 5-10 minutes, or more. This is her estimate, but no one has actually timed it and told her. Usually her next memory is waking up in ambulance or hospital.     Previous trial: Topiramate (unknown dose), switched to Vimpat in college. Unclear why she made the switch.     Works as [employer] holden full time (night shift: 10pm to 7am, 40 hours a week). She drives; no accidents.     Currently living with friends. Independent in ADLs, managing her own medications. Sleeps 6-8 hrs a day. Looking for daytime job as a manager.     She is interested in getting off of AED. She is concerned that vimpat has been affecting her thinking and memory. Example: If someone tells her to do something, she will forget to do it; this occurs daily. May tell a story to a friend and forgotten that she had already told the friend the same thing already. Hard to do more than one thing at a time. Hard to multitask.     Previously seen in [place] by neurologist there. Brain MRIs and EEGs done there. Had been seen by Dr Su Hubbard (under former name of Raj Florian).     I reviewed chart:  3/19/19 - last seizure 1-2 years ago. interested in tapering off of AEDs; referred to neurology.       3 EVENTS 9/1/2020 The events lasted 3 mins and involved thrashing arms about with subsequent delirium and agitation. Reported without LOC. This " has happened before. Unclear if patient has recent significant stress, sleep deprivation, or other triggers.      Tongue biting: yes  Incontinence: no  Injuries: no  Post-ictal period: yes  Auras: yes  Hallucinations: unclear  ----  Sweating/lightheaded prodrome: not clear  History of heart problems: not reported  SOB: no  Headache: not reported  Alcohol use: denied to ED, not tested  Drug use: denied to ED, not tested    Patient denies any history of previous seizures including febrile seizures, CNS infections, TBI, CNS tumors.       PMH:  Past Medical History:   Diagnosis Date     Seizure (**)     PSH: No head or neck surgery  Past Surgical History:   Procedure Laterality Date     TONSILLECTOMY     Fam Hx:  No family history of seizure  Family History   Problem Relation Name Age of Onset     Cancer Mother     Diabetes Maternal Grandmother     Heart Neg Hx     Pulmonary Diseases Neg Hx     Hypertension Neg Hx     Auto Immune Neg Hx     Psychiatry Neg Hx     Diagnostics: brain MRI and EEG at [facility]  5/15/19 EEG through [facility]: This electroencephalogram is abnormal due to the presence of polyspike and wave discharges. These are typically seen in generalized epilepsy, typically of the genetic type. There were no shruti or electrographic seizures seen during the recording.     Social history:  Occasional alcohol  Occupational History     Occupation: overnight   Employer: [employer]   Tobacco Use     Smoking status: Never Smoker     Smokeless tobacco: Never Used   Substance and Sexual Activity     Alcohol use: Yes   Frequency: Monthly or less   Drinks per session: 1 or 2     Drug use: Never     Sexual activity: Yes   Partners: Female, Male   Birth control/protection: Condom   Social History Narrative   Recently moved from [place] (3/2019), works at [employer] overnight.     Current medication list:  Current Outpatient Medications on File Prior to Visit   Medication Sig Dispense Refill     Current Facility-Administered Medications    Medication     levETIRAcetam (KEPPRA) 2,000 mg in sodium chloride 0.9 % 250 mL intermittent infusion     LORazepam (ATIVAN) injection 2 mg     Current Outpatient Medications   Medication     estradiol (ESTRACE) 2 MG tablet     levETIRAcetam (KEPPRA) 500 MG tablet     triamcinolone (KENALOG) 0.1 % external ointment           ROS:  General: No fever, no weight loss  Eyes: No vision loss, no visual changes  Ears: No hearing changes or hearing loss  Mouth/Throat: No sore throat, difficulty chewing or swallowing.  Nose: No nasal congestion or runny nose  Pulm: No shortness of breath, wheezing, coughing  Heart: No chest pain or palpitations  GI: No abdominal pain, nausea, vomiting, constipation or diarrhea  : No dysuria or hematuria. No incontinence of bladder  MSKL: No muscle cramps or joint pain  Neuro: No numbness, weakness, tingling. No headaches. + seizures   Psych: No depression or anxiety            PAST MEDICAL HISTORY:        Past Medical History:   Diagnosis Date     Anxiety      Asthma      Depression      Epilepsy (H)      Sleep apnea            Past Surgical History:   Procedure Laterality Date     C EACH ADD TOOTH EXTRACTION       ORCHIECTOMY SCROTAL Bilateral 11/22/2019    Procedure: BILATERAL SIMPLE SCROTAL ORCHIECTOMY;  Surgeon: Toribio Morales MD;  Location: [facility] OR     TONSILLECTOMY      10 years old       MEDICATIONS:   Current Facility-Administered Medications   Medication     levETIRAcetam (KEPPRA) 2,000 mg in sodium chloride 0.9 % 250 mL intermittent infusion     LORazepam (ATIVAN) injection 2 mg     Current Outpatient Medications   Medication     estradiol (ESTRACE) 2 MG tablet     levETIRAcetam (KEPPRA) 500 MG tablet     triamcinolone (KENALOG) 0.1 % external ointment     (Not in a hospital admission)                 ALLERGIES:    Allergies   Allergen Reactions     Codeine Hives     Difficulty breathing, throat swelling..Happen approximately 14 years ago      Nickel Rash       SOCIAL  HISTORY  Social History     Socioeconomic History     Marital status: Single     Spouse name: Not on file     Number of children: Not on file     Years of education: Not on file     Highest education level: Not on file   Occupational History     Not on file   Social Needs     Financial resource strain: Not on file     Food insecurity     Worry: Not on file     Inability: Not on file     Transportation needs     Medical: Not on file     Non-medical: Not on file   Tobacco Use     Smoking status: Never Smoker     Smokeless tobacco: Never Used   Substance and Sexual Activity     Alcohol use: Yes     Comment: occasional, socially     Drug use: No     Sexual activity: Yes     Partners: Female, Male   Lifestyle     Physical activity     Days per week: Not on file     Minutes per session: Not on file     Stress: Not on file   Relationships     Social connections     Talks on phone: Not on file     Gets together: Not on file     Attends Pentec[employer]alism service: Not on file     Active member of club or organization: Not on file     Attends meetings of clubs or organizations: Not on file     Relationship status: Not on file     Intimate partner violence     Fear of current or ex partner: Not on file     Emotionally abused: Not on file     Physically abused: Not on file     Forced sexual activity: Not on file   Other Topics Concern     Not on file   Social History Narrative     Not on file         FAMILY HISTORY:  Family History   Problem Relation Age of Onset     Breast Cancer Mother      Bleeding Disorder Father            PHYSICAL EXAMINATIONS:  Vital Signs: Blood pressure 108/49, pulse 116, temperature 98.1  F (36.7  C), temperature source Oral, resp. rate (!) 41, SpO2 100 %, not currently breastfeeding.  General: somnolent  Neurologic exam:  Mental Status: Somnolent, not obtunded, disoriented to p/t. Follows commands. Speech is slow and a bit slurred. Comprehension and repetition impaired. No dysarthria.  Cranial Nerves: pupils  4 mm, equal and reactive to light and accommodation, EOMI without nystagmus, eyes move conjugately, no saccadic movement, no skew. Visual fields full. Smile and eyebrow raise equal, blinking symmetric, no weakness. Lashes are buried on eye squeeze. Nasolabial folds symmetric. Facial sensation (V1-3) equal. Tongue midline, good power. Shoulder shrug symmetric, hearing intact to conversation.  Blood from lips/right tongue.  Motor: no atrophy or fasciculations observed. No motor, rebound, or sensory drift.     Reflexes: normoreflexic and symmetric at the biceps/brachioradialis/patellar/achilles. Toes are downgoing. Gabellar, rooting, snouting, grasping, and palmomental reflexes are not observed.  Sensation: intact to light touch, pinprick, all four extremities.  Coordination: Unable to cooperate  Gait: unable to cooperate        REVIEW OF LABORATORY, PATHOLOGY, AND RADIOLOGY DATA:      Lab results:   No results found for: PH, PHARTERIAL, PO2, LE4WTHKFREH, SAT, PCO2, HCO3, BASEEXCESS, HCAZ, BEB       Lab Results   Component Value Date     09/01/2020    Lab Results   Component Value Date    CHLORIDE 106 09/01/2020    Lab Results   Component Value Date    BUN 12 09/01/2020      Lab Results   Component Value Date    POTASSIUM 3.6 09/01/2020    Lab Results   Component Value Date    CO2 27 09/01/2020    Lab Results   Component Value Date    CR 0.66 09/01/2020        No results found for: NTBNPI, NTBNP  Lab Results   Component Value Date    WBC 9.0 09/01/2020    HGB 12.4 09/01/2020    HCT 36.9 09/01/2020    MCV 85 09/01/2020     09/01/2020     Lab Results   Component Value Date    CR 0.66 09/01/2020     No results found for: DD, DIMER  Lab Results   Component Value Date     09/01/2020    POTASSIUM 3.6 09/01/2020    CHLORIDE 106 09/01/2020    CO2 27 09/01/2020     (H) 09/01/2020     No results found for: SED  Lab Results   Component Value Date    GFRESTIMATED >90 09/01/2020    GFRESTBLACK >90  09/01/2020     Lab Results   Component Value Date     (H) 09/01/2020     Lab Results   Component Value Date    HGB 12.4 09/01/2020     Lab Results   Component Value Date    AST 15 06/05/2020    ALT 27 06/05/2020    ALKPHOS 35 (L) 06/05/2020    BILITOTAL 0.2 06/05/2020     No results found for: INR  No results found for: BILINEONATAL, TCBIL  Lab Results   Component Value Date     09/01/2020     Lab Results   Component Value Date    BUN 12 09/01/2020    CR 0.66 09/01/2020     No results found for: TSH  No results found for: TROPONIN, TROPI, TROPR, TROPN  Lab Results   Component Value Date    URINEKETONE 5 (A) 09/01/2020     Lab Results   Component Value Date    WBC 9.0 09/01/2020            Head CT results: No results found.            Primary care physician: Josie Macias    Attending Physician: Herve Clemons MD Brent Michael Berry, MD  Neurology G2  [id number]      .attestationprimary

## 2020-09-02 NOTE — PLAN OF CARE
Status: Patient admitted following seizure.  Vitals: VSS.  Neuros: Lethargic this shift. Oriented x4. Neuros intact other than generalized weakness.  IV: PIV @ 100 mL/hr.  Resp/trach: No issues.  Diet: Regular diet.  Bowel status: No BM this shift.  : Voiding spontaneously.  Skin: Scabs/rash throughout body.  Pain: Denies.  Activity: Up with 1, GB, and walker.  Social: Girlfriend at bedside.   Plan: Possible discharge home tonight. Will continue to monitor.

## 2020-09-02 NOTE — PROGRESS NOTES
Time/length of seizure event: ~5 minutes  Movements (head, eyes, extremities): Stiffness, head turned to L, roving eye movements, dilated and fixed pupils  Orientation during seizure: CRISS  After seizure, remembers the unique phrase given during seizure:CRISS  After seizure, remembers an unnamed visual object shown during seizure:CRISS  Able to identify/name aloud item during seizure:CRISS  Able to follow command during seizure:No   Able to read test sentence aloud during seizure:CRISS  Able to read test sentence aloud again after the seizure:CRISS  After seizure, remembers name of object shown during seizure:RCISS  Orientation and level of consciousness after seizure: Lethargic  VS and oxygen saturation during/after seizure: Tachycardic up to 152; O2 desaturation to 50% - placed on 9L NC with O2 in %  Recall of the event?: CRISS  Was this a typical seizure/event?: Yes  Presence of aura or pre-seizure activity: CRISS  Incontinence:No

## 2020-09-02 NOTE — PLAN OF CARE
Status: Patient admitted from Center ED for seizure monitoring  Vitals: Stable on RA. Continuous pulse ox in place. Desatting and tachycardia with events.   Neuros: Lethargic, oriented x4 when awake. 1 event witnessed this shift, see previous note.   IV: PIV running NS at 100 mL/hr  Resp/trach: Required O2 during event, otherwise on RA  Diet: Clear liquid ADAT  Bowel status: BS+  : No void overnight  Skin: Rash and scabbing throughout body. Significant rash near umbilicus and lower back with excoriation. Patient states she uses a cream at home to manage this.   Pain: Denies  Activity: Up with 1, no OOB overnight  Social: No visitors overnight. Update given to mother by charge nurse  Plan: Continue to monitor

## 2020-09-02 NOTE — PROGRESS NOTES
Per chart review, pt does not have insurance. SW phoned [place] Financial Counselor, Lucero Mayes ([id number]) to refer pt.  Lucero states that she was already aware of pt and will follow up with pt today in regards to applying for [place] [facility] Care programs if appropriate.    EFREM Espinal  Social Work, 6A  Phone:  [phone]  Pager:  [phone]  9/2/2020

## 2020-09-03 LAB
LEVETIRACETAM SERPL-MCNC: 16 UG/ML (ref 12–46)
LEVETIRACETAM SERPL-MCNC: 42 UG/ML (ref 12–46)

## 2020-09-03 NOTE — PROGRESS NOTES
[facility]: Post-Discharge Note  SITUATION                                                      Admission:    Admission Date: 09/01/20   Reason for Admission: Seizure history  Discharge:   Discharge Date: 09/02/20  Discharge Diagnosis: Seizure history  Discharge Service: Neurology  Discharge Plan:  Neurology    BACKGROUND                                                      25 year old woman brought in by family due to 2 witnessed seizures while at home.  First one lasted 3 to 4 minutes and the second approximately 2 minutes.  Patient was sitting and watching television during the first and lying in bed during the second.  No fall or head injury.  Patient states she feels tired at this time.  Denies any recent illness or fevers.  No cough, chest pain, shortness of breath.  Patient is on Keppra 2000 mg, daily, without any recent dose changes    ASSESSMENT      Discharge Assessment  Patient reports symptoms are: Improved(no episodes)  Does the patient have all of their medications?: Yes  Does patient know what their new medications are for?: Yes  Does patient have a follow-up appointment scheduled?: Yes  Does patient have any other questions or concerns?: No    Post-op  Did the patient have surgery or a procedure: No  Fever: No  Eating & Drinking: eating and drinking without complaints/concerns  PO Intake: regular diet  Bowel Function: normal  Urinary Status: voiding without complaint/concerns        PLAN                                                      Outpatient Plan:      Future Appointments   Date Time Provider Department Center   9/24/2020  2:45 PM Epifanio Biggs MD [facility]           Shirlene Saab

## 2020-09-09 DIAGNOSIS — F64.0 GENDER DYSPHORIA IN ADULT: Chronic | ICD-10-CM

## 2020-09-09 RX ORDER — ESTRADIOL 2 MG/1
2 TABLET ORAL 2 TIMES DAILY
Qty: 60 TABLET | Refills: 0 | Status: SHIPPED | OUTPATIENT
Start: 2020-09-09 | End: 2020-11-13

## 2020-09-09 NOTE — TELEPHONE ENCOUNTER
"Request for medication refill:Estradiol 2mg    Providers if patient needs an appointment and you are willing to give a one month supply please refill for one month and  send a letter/MyChart using \".SMILLIMITEDREFILL\" .smillimited and route chart to \"P SMI \" (Giving one month refill in non controlled medications is strongly recommended before denial)    If refill has been denied, meaning absolutely no refills without visit, please complete the smart phrase \".smirxrefuse\" and route it to the \"P SMI MED REFILLS\"  pool to inform the patient and the pharmacy.    Keyla Mcnulty, CMA        "

## 2020-09-17 LAB
11OH-THC SERPL-MCNC: NEGATIVE NG/ML
7AMINOCLONAZEPAM BLD CFM-MCNC: NEGATIVE NG/ML
ALPRAZ SERPL-MCNC: NEGATIVE NG/ML
AMPHETAMINES UR QL: NEGATIVE NG/ML
BARBITURATES SERPLBLD QL: NEGATIVE UG/ML
BENZODIAZ SERPL QL: POSITIVE
BENZODIAZ SERPL QL: POSITIVE NG/ML
CANNABIDIOL: NEGATIVE NG/ML
CANNABINOIDS BLD CFM-MCNC: NEGATIVE NG/ML
CANNABINOIDS SERPL QL CFM: NORMAL
CANNABINOIDS SERPL QL: ABNORMAL
CANNABINOL: NEGATIVE NG/ML
CARBOXYTHC UR QL: NORMAL
CHLORDIAZEP SERPL-MCNC: NEGATIVE NG/ML
CLONAZEPAM SERPL CFM-MCNC: NEGATIVE NG/ML
COCAINE SERPL QL: NEGATIVE NG/ML
DESALKYLFLURAZ SERPL-MCNC: NEGATIVE NG/ML
DIAZEPAM SERPL-MCNC: NEGATIVE NG/ML
ETHANOL BLD GC-MCNC: NEGATIVE GM/DL
FLURAZEPAM SERPL-MCNC: NEGATIVE NG/ML
LORAZEPAM BLD CFM-MCNC: 23.1 NG/ML
METHADONE SERPL QL: NEGATIVE NG/ML
MIDAZOLAM BLD CFM-MCNC: NEGATIVE NG/ML
NORCHLORDIAZEP SERPL-MCNC: NEGATIVE NG/ML
NORDIAZEPAM SERPL-MCNC: NEGATIVE NG/ML
OPIATES SERPL QL: NEGATIVE NG/ML
OXAZEPAM SERPL-MCNC: NEGATIVE NG/ML
OXYCODONE SERPL QL: NEGATIVE NG/ML
PCP SERPL QL: NEGATIVE NG/ML
PROPOXYPH SERPL QL: NEGATIVE NG/ML
TEMAZEPAM SERPL-MCNC: NEGATIVE NG/ML
TRIAZOLAM BLD-MCNC: NEGATIVE NG/ML

## 2020-11-13 ENCOUNTER — MYC REFILL (OUTPATIENT)
Dept: FAMILY MEDICINE | Facility: CLINIC | Age: 25
End: 2020-11-13

## 2020-11-13 DIAGNOSIS — F64.0 GENDER DYSPHORIA IN ADULT: Chronic | ICD-10-CM

## 2020-11-14 RX ORDER — ESTRADIOL 2 MG/1
2 TABLET ORAL 2 TIMES DAILY
Qty: 60 TABLET | Refills: 0 | Status: SHIPPED | OUTPATIENT
Start: 2020-11-14 | End: 2020-11-24

## 2020-11-24 ENCOUNTER — OFFICE VISIT (OUTPATIENT)
Dept: FAMILY MEDICINE | Facility: CLINIC | Age: 25
End: 2020-11-24
Payer: MEDICAID

## 2020-11-24 VITALS
WEIGHT: 206.4 LBS | BODY MASS INDEX: 31.38 KG/M2 | OXYGEN SATURATION: 95 % | DIASTOLIC BLOOD PRESSURE: 72 MMHG | HEART RATE: 85 BPM | SYSTOLIC BLOOD PRESSURE: 109 MMHG | TEMPERATURE: 98.3 F

## 2020-11-24 DIAGNOSIS — R45.89 DEPRESSED MOOD: ICD-10-CM

## 2020-11-24 DIAGNOSIS — F64.0 GENDER DYSPHORIA IN ADULT: Primary | Chronic | ICD-10-CM

## 2020-11-24 PROCEDURE — 99214 OFFICE O/P EST MOD 30 MIN: CPT | Mod: GC | Performed by: FAMILY MEDICINE

## 2020-11-24 RX ORDER — ESTRADIOL 2 MG/1
2 TABLET ORAL 2 TIMES DAILY
Qty: 180 TABLET | Refills: 1 | Status: SHIPPED | OUTPATIENT
Start: 2020-11-24 | End: 2021-03-02

## 2020-11-24 RX ORDER — FLUOXETINE 20 MG/1
TABLET, FILM COATED ORAL
Qty: 55 TABLET | Refills: 0 | Status: SHIPPED | OUTPATIENT
Start: 2020-11-24 | End: 2020-11-28

## 2020-11-24 SDOH — HEALTH STABILITY: MENTAL HEALTH: HOW MANY STANDARD DRINKS CONTAINING ALCOHOL DO YOU HAVE ON A TYPICAL DAY?: 3 OR 4

## 2020-11-24 SDOH — ECONOMIC STABILITY: FOOD INSECURITY: WITHIN THE PAST 12 MONTHS, THE FOOD YOU BOUGHT JUST DIDN'T LAST AND YOU DIDN'T HAVE MONEY TO GET MORE.: OFTEN TRUE

## 2020-11-24 SDOH — HEALTH STABILITY: MENTAL HEALTH
STRESS IS WHEN SOMEONE FEELS TENSE, NERVOUS, ANXIOUS, OR CAN'T SLEEP AT NIGHT BECAUSE THEIR MIND IS TROUBLED. HOW STRESSED ARE YOU?: VERY MUCH

## 2020-11-24 SDOH — SOCIAL STABILITY: SOCIAL NETWORK
DO YOU BELONG TO ANY CLUBS OR ORGANIZATIONS SUCH AS CHURCH GROUPS UNIONS, FRATERNAL OR ATHLETIC GROUPS, OR SCHOOL GROUPS?: NO

## 2020-11-24 SDOH — ECONOMIC STABILITY: FOOD INSECURITY: WITHIN THE PAST 12 MONTHS, YOU WORRIED THAT YOUR FOOD WOULD RUN OUT BEFORE YOU GOT MONEY TO BUY MORE.: OFTEN TRUE

## 2020-11-24 SDOH — HEALTH STABILITY: MENTAL HEALTH: HOW OFTEN DO YOU HAVE A DRINK CONTAINING ALCOHOL?: MONTHLY OR LESS

## 2020-11-24 SDOH — HEALTH STABILITY: PHYSICAL HEALTH: ON AVERAGE, HOW MANY MINUTES DO YOU ENGAGE IN EXERCISE AT THIS LEVEL?: 0 MIN

## 2020-11-24 SDOH — SOCIAL STABILITY: SOCIAL NETWORK: HOW OFTEN DO YOU ATTEND CHURCH OR RELIGIOUS SERVICES?: NEVER

## 2020-11-24 SDOH — SOCIAL STABILITY: SOCIAL NETWORK: ARE YOU MARRIED, WIDOWED, DIVORCED, SEPARATED, NEVER MARRIED, OR LIVING WITH A PARTNER?: LIVING WITH PARTNER

## 2020-11-24 SDOH — ECONOMIC STABILITY: TRANSPORTATION INSECURITY
IN THE PAST 12 MONTHS, HAS THE LACK OF TRANSPORTATION KEPT YOU FROM MEDICAL APPOINTMENTS OR FROM GETTING MEDICATIONS?: YES

## 2020-11-24 SDOH — HEALTH STABILITY: PHYSICAL HEALTH: ON AVERAGE, HOW MANY DAYS PER WEEK DO YOU ENGAGE IN MODERATE TO STRENUOUS EXERCISE (LIKE A BRISK WALK)?: 0 DAYS

## 2020-11-24 SDOH — SOCIAL STABILITY: SOCIAL NETWORK
IN A TYPICAL WEEK, HOW MANY TIMES DO YOU TALK ON THE PHONE WITH FAMILY, FRIENDS, OR NEIGHBORS?: MORE THAN THREE TIMES A WEEK

## 2020-11-24 SDOH — HEALTH STABILITY: MENTAL HEALTH: HOW OFTEN DO YOU HAVE 6 OR MORE DRINKS ON ONE OCCASION?: LESS THAN MONTHLY

## 2020-11-24 SDOH — SOCIAL STABILITY: SOCIAL NETWORK: HOW OFTEN DO YOU ATTENT MEETINGS OF THE CLUB OR ORGANIZATION YOU BELONG TO?: NEVER

## 2020-11-24 SDOH — ECONOMIC STABILITY: INCOME INSECURITY: HOW HARD IS IT FOR YOU TO PAY FOR THE VERY BASICS LIKE FOOD, HOUSING, MEDICAL CARE, AND HEATING?: HARD

## 2020-11-24 SDOH — ECONOMIC STABILITY: TRANSPORTATION INSECURITY
IN THE PAST 12 MONTHS, HAS LACK OF TRANSPORTATION KEPT YOU FROM MEETINGS, WORK, OR FROM GETTING THINGS NEEDED FOR DAILY LIVING?: YES

## 2020-11-24 SDOH — SOCIAL STABILITY: SOCIAL NETWORK: HOW OFTEN DO YOU GET TOGETHER WITH FRIENDS OR RELATIVES?: MORE THAN THREE TIMES A WEEK

## 2020-11-24 SDOH — ECONOMIC STABILITY: INCOME INSECURITY: IN THE LAST 12 MONTHS, WAS THERE A TIME WHEN YOU WERE NOT ABLE TO PAY THE MORTGAGE OR RENT ON TIME?: YES

## 2020-11-24 ASSESSMENT — ANXIETY QUESTIONNAIRES
GAD7 TOTAL SCORE: 19
3. WORRYING TOO MUCH ABOUT DIFFERENT THINGS: NEARLY EVERY DAY
7. FEELING AFRAID AS IF SOMETHING AWFUL MIGHT HAPPEN: NEARLY EVERY DAY
7. FEELING AFRAID AS IF SOMETHING AWFUL MIGHT HAPPEN: NEARLY EVERY DAY
5. BEING SO RESTLESS THAT IT IS HARD TO SIT STILL: MORE THAN HALF THE DAYS
6. BECOMING EASILY ANNOYED OR IRRITABLE: NEARLY EVERY DAY
GAD7 TOTAL SCORE: 19
GAD7 TOTAL SCORE: 19
1. FEELING NERVOUS, ANXIOUS, OR ON EDGE: NEARLY EVERY DAY
2. NOT BEING ABLE TO STOP OR CONTROL WORRYING: NEARLY EVERY DAY
4. TROUBLE RELAXING: MORE THAN HALF THE DAYS

## 2020-11-24 ASSESSMENT — PATIENT HEALTH QUESTIONNAIRE - PHQ9
10. IF YOU CHECKED OFF ANY PROBLEMS, HOW DIFFICULT HAVE THESE PROBLEMS MADE IT FOR YOU TO DO YOUR WORK, TAKE CARE OF THINGS AT HOME, OR GET ALONG WITH OTHER PEOPLE: VERY DIFFICULT
SUM OF ALL RESPONSES TO PHQ QUESTIONS 1-9: 22
SUM OF ALL RESPONSES TO PHQ QUESTIONS 1-9: 22

## 2020-11-24 NOTE — PROGRESS NOTES
[facility]    Assessment and Plan     Kun is a 25 year old transgender female / male-to-female who presents for evaluation of: Recheck Medication (refill Estradiol)       Gender dysphoria in adult - trangender female  Stable on this dose, s/p bilateral orchiectomy, undergoing electrolysis (on hold due to COVID-19)  -     estradiol (ESTRACE) 2 MG tablet; Take 1 tablet (2 mg) by mouth 2 times daily    Depressed mood  PHQ-9 22 with suicidal ideation. Good support at home, denies plans or access to guns. Interested in therapy but concerned about cost and insurance coverage. Would like to start anxiety/depression medication.   -     FLUoxetine 20 MG tablet; Take 0.5 tablets (10 mg) by mouth daily for 7 days, THEN 1 tablet (20 mg) daily.  - Reviewed increased risk of suicide in the first few months, will call 911, clinic, or go to the ED if worsening suicidal ideation  -  Provided written contact information for MH providers    Return in about 2 weeks (around 12/8/2020) for Follow-up via [facility] at the very least.    Options for treatment and follow-up care were reviewed with the patient/caregivers. They engaged in the decision making process and verbalized understanding of the options discussed and agreed with the final plan.    Medications Discontinued During This Encounter   Medication Reason     estradiol (ESTRACE) 2 MG tablet Reorder       Josie Macias MD  Family Medicine Resident [facility], PGY-3  Phone: [phone]       Subjective      History obtained from patient and chart review     Kun is a 25 year old transgender female / male-to-female, here for   Chief Complaint   Patient presents with     Recheck Medication     refill Estradiol      Gender identity: female    Any special concerns today?  concerns about mood.    Currently unemployed, having financial issues due to recent hospitalizations/ED visits for breakthrough seizures since changing anti-epileptic (Vimpat > Keppra). Recently increased  "dose following another seizure. Last episode in early November, in bed.   Very upset due to having to postpone gender reaffirming procedures: hair removal and bottom surgery (purely due to COVID-19)  Mom is very supportive     On hormones?  YES +++ Shot day of the week? Not applicable-taking pills/patch/gel      Due for labs?  No      +++ Refills of meds needed?  Yes    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       Problem, Medication and Allergy Lists were reviewed and are current..  Patient is an established patient of this clinic, so medical, surgical, family and social history were reviewed and updated as needed.     Answers for HPI/ROS submitted by the patient on 11/24/2020   If you checked off any problems, how difficult have these problems made it for you to do your work, take care of things at home, or get along with other people?: Very difficult  PHQ9 TOTAL SCORE: 22  TODD 7 TOTAL SCORE: 19    Lives with roommates and girlfriend    Has thought about starting anti-anxiety medication     Feels like getting stuck, and has no control over  \"contant feeling of being burnt out\"    Suicidal ideation, no attempts or plans  Has thought about jumping off a roof  No access to guns    Afraid of feeling like shit after if it doesn't work    Patient is an established patient of this clinic, so I updated the problem, medication and allergy lists, as well as past, surgical, family and social history.      ROS  General    Fat redistribution: YES    Weight change: YES HEENT    Voice change: not enough     Cardiovascular (CV)    Chest Pains: no    Shortness of breath: no Chest    Decreased exercise tolerance:  no    Breast changes/development: YES - stable     Gastrointestinal (GI)    Abdominal pain: no    Change in appetite: no Skin    Acne or oily skin: no    Change in hair: no     Genitourinary ()    Abnormal vaginal bleeding: not applicable     Decreased " spontaneous erections: YES    Change in libido: YES    New sexual partners: no Musculoskeletal    Leg pain or swelling: no       Objective     Vital signs  /72   Pulse 85   Temp 98.3  F (36.8  C) (Oral)   Wt 93.6 kg (206 lb 6.4 oz)   SpO2 95%   BMI 31.38 kg/m      Vitals were reviewed and were normal     General: Pleasant. Well appearing. Non-toxic.    HEENT: No signs of trauma. No rhinorrhea. Moist membranes.   Eyes: Conjunctivae are normal. Pupils are equal.   Neck: Normal range of motion.    Resp: No respiratory distress. clear breath sounds  CV: RRR  MSK: Normal range of motion. No obvious deformity or leg edema.  Neuro: The patient is alert and interactive. Speech normal. No gross focal findings.   Skin: No lesion or sign of trauma noted.   Psych: Affect is concordant with mood, behavior is normal.    Results  Hemoglobin   Date Value Ref Range Status   09/02/2020 12.4 11.7 - 15.7 g/dL Final     AST   Date Value Ref Range Status   09/02/2020 16 0 - 45 U/L Final     ALT   Date Value Ref Range Status   09/02/2020 21 0 - 50 U/L Final     Alkaline Phosphatase   Date Value Ref Range Status   09/02/2020 36 (L) 40 - 150 U/L Final     Bilirubin Total   Date Value Ref Range Status   09/02/2020 0.3 0.2 - 1.3 mg/dL Final

## 2020-11-24 NOTE — PATIENT INSTRUCTIONS
Individual Mental Health Practitioners   Therapist Children Adolescents Adults Family Other   Eduardo Kaur, [facility], DCSW  [address]  [facility] [address]  Work Phone: [phone]   YES YES    Tracie Arauz PsyD, LP  [address]  [place]  [address]  PH: [phone]   YES YES    Windy Brown, [facility]  [address]  [place] [address]  HBCS  [phone]   YES YES On Busline  Couples too   Stephanie Tamez PsyD, [facility]  [facility]  [address]  [place], [place]   PH:[phone] YES YES YES YES Business consulting   Nitin Velasquez, PhD,   [address]  [place] [address]  PH: [phone]  [employer]  Older Adolescents YES  On busline   Sarabjit Lopez, [facility]  [address]  [place] [address]  PH: [phone]  [employer]  YES YES YES Busline   Fela Sullivan M.Ed, [facility], LAD  [address]  [place], [place]   [phone]  [email or web address]   YES YES  Experienced in Trans Veterans    Shira Salazar MA, LMFT, CST  [address]  [place], [place] [id number] [phone]  [facility] [employer]  YES YES YES Sex positive therapy    LILIANA Fischer MFA, PsDax, LP   [address]  [place], [place] [id number] [phone]  [email or web address]   YES YES Sexuality groups  [facility]        Mental Health Clinics   [facility]   Patients: Children, Teen, Adult, Families, Couples  [address]  [place] [address] [phone] fax  [facility]"[employer]    [facility]  Patients: Children, adolescents, adults  Locations:  [address]  [place] [address] -[address]  [place] [address] -[address]  [place] [address] [address] [address]    Appointment Scheduling   [phone]  [email or web address]  [facility]  Family Services  All ages- Also offers in-home therapy and sliding fee  [address]  [place] [address]   Phone: [phone]  PWA    [facility] [place] [address]  Intake line [phone]  [email or web address]/programsservices/counseling/transgender-mental-health/    Transgender Mental Health Team  Therapists Children Adolescents Adults Family Other information Chavez(Adryan) Mikal Sauer, MSW,LICSW YES YES YES  Upper sorbian and English  [facility] Location- [facility]   Sweta Carrasquillo MA, LMFT YES YES YES  [place] Location   Portia Simmons-JOHN Savage, LICSW   YES YES EMDR  [place] Location     Manny Bliss MA, LP YES YES YES YES [facility] Location- [facility] Sexual medicine-Psychology  [email or web address]  [place] Location   [address] [place] [address]  Phone [phone]  Therapists Children Adolescents Adults Family Other   Brittaney Navarro, PsyD, LP   YES YES-couples    Jonathan OconnoryD, LP   YES     Keyla Kam, PhD, LP   YES       [place] Location  [address] [place] [address]  Phone: [phone]  Therapists Children Adolescents Adults Family Other   Lauren Angela PsyD, LP   YES YES    Inga Ball PsyD, LP   YES       [facility]  Patients-Queer and Trans Youth and Family counseling serving ages 12-26  [address] [place] [address]  Phone: [phone]  http://Reclaim.[facility]  [employer] [facility]  [address]  [place] [address]  Main line: [phone]  [email or web address]    [facility] for Sexual Health/ Program in Human Sexuality  [address]  [place] [address]  PH: [phone]   [email or web address].[facility].edu/clinic-center-sexual-health/transgender-health-services    Therapists Children Adolescents Adults Family  Other information   Maile Gresham PsyD YES YES YES     Tiffany Martinez, PhD YES YES YES     Elen Fonseca, JonathanyD  YES YES     Aleah Avalos, PhD YES YES YES     Damien Heller, PhD, MPH YES YES   Sexual and Gender minority health   Multiple other Post-Doctoral fellows practicing at this location as well           [facility] Location: [address] [place] [address]  [place] Location: [address] [address]  Toll Free: [phone]  Phone: [phone]   [email or web address]://www.[email or web address]/therapeutic-services      Chemical Health Resources    [facility]  [address] [place] [address]  Phone: [phone] or toll free, [phone]  http://[email or web address]/    [facility]- LGBT Residential CD Treatment Program  [address] [place] [address]  [phone]  http://[email or web address]/programs/residential/pqqyblpbf-uwzr-skwvsiurcxh-[facility]-[place]/    Additional Provider Directories  [facility]- Provider Directory for all services  http://www.rainbowhealth.org/resources-for-you/provider-directory/    [place]'s lesbian díaz bisexual transgender & allied mental health providers' network  http://www.lgbttherapists.org/    Provider Directory for Health Related resources in [place]  http://mnlgbtqdirectory.org    Directory for kink, Polyamory, gender non-conforming aware Providers  http://www.mistyaware.org

## 2020-11-24 NOTE — PROGRESS NOTES
Preceptor Attestation:   Patient seen, evaluated and discussed with the resident. I have verified the content of the note, which accurately reflects my assessment of the patient and the plan of care.   Supervising Physician:  Shirlene Anne MD

## 2020-11-25 ENCOUNTER — TELEPHONE (OUTPATIENT)
Dept: FAMILY MEDICINE | Facility: CLINIC | Age: 25
End: 2020-11-25

## 2020-11-25 DIAGNOSIS — R45.89 DEPRESSED MOOD: Primary | ICD-10-CM

## 2020-11-25 ASSESSMENT — PATIENT HEALTH QUESTIONNAIRE - PHQ9: SUM OF ALL RESPONSES TO PHQ QUESTIONS 1-9: 22

## 2020-11-25 ASSESSMENT — ANXIETY QUESTIONNAIRES: GAD7 TOTAL SCORE: 19

## 2020-11-25 NOTE — TELEPHONE ENCOUNTER
Prior Authorization Retail Medication Request    Medication/Dose: Fluoxetine 20mg  ICD code (if different than what is on RX):  Associated Diagnoses    Depressed mood [R45.89]           Previously Tried and Failed:    Rationale:      Insurance Name:  MA  Insurance ID:    [id number]     Pharmacy Information (if different than what is on RX)  Name:    Phone:

## 2020-11-25 NOTE — TELEPHONE ENCOUNTER
Central Prior Authorization Team   Phone: [phone]      PA Initiation    Medication: Fluoxetine 20mg  Insurance Company: Medicare Limited Income NET (LI NET) Program - Phone [phone] Fax [phone]  Pharm[facility]illing the Rx: [id number] [place], [place] - [address]  Filling Pharmacy Phone: [phone]  Filling Pharmacy Fax:    Start Date: 11/25/2020        v

## 2020-11-26 NOTE — TELEPHONE ENCOUNTER
PRIOR AUTHORIZATION DENIED    Medication: Fluoxetine 20mg- DENIED    Denial Date: 11/25/2020    Denial Rational:               Appeal Information:

## 2020-11-27 NOTE — TELEPHONE ENCOUNTER
Prior Authorization:     Denied Reason: see chart    Alternatives: None available    Pharmacy notified.  Routing to MD    Please advise if you would like to move forward with the appeal process or plan to  prescribe an alternative medication. If Appeal is desired a letter of medical necessity with denial rationale is needed to start the appeal process.   Route to PA Pool when completed.    Maria Eugenia Mcnally CMA on 11/27/2020 at 9:20 AM

## 2020-11-28 RX ORDER — FLUOXETINE 10 MG/1
10 CAPSULE ORAL DAILY
Qty: 7 CAPSULE | Refills: 0 | Status: SHIPPED | OUTPATIENT
Start: 2020-11-28 | End: 2021-03-11

## 2020-11-28 NOTE — TELEPHONE ENCOUNTER
Prescribed taper with fluoxetine capsules instead of tabs, due to insurance coverage.     Josie Macias MD

## 2020-12-24 ENCOUNTER — MYC MEDICAL ADVICE (OUTPATIENT)
Dept: FAMILY MEDICINE | Facility: CLINIC | Age: 25
End: 2020-12-24

## 2020-12-24 DIAGNOSIS — L30.9 DERMATITIS: ICD-10-CM

## 2020-12-31 RX ORDER — TRIAMCINOLONE ACETONIDE 1 MG/G
OINTMENT TOPICAL
Qty: 80 G | Refills: 0 | Status: SHIPPED | OUTPATIENT
Start: 2020-12-31 | End: 2021-03-12

## 2021-01-18 DIAGNOSIS — R45.89 DEPRESSED MOOD: ICD-10-CM

## 2021-01-18 NOTE — TELEPHONE ENCOUNTER
"Request for medication refill:  FLUoxetine (PROZAC) 20 MG capsule    Providers if patient needs an appointment and you are willing to give a one month supply please refill for one month and  send a letter/MyChart using \".SMILLIMITEDREFILL\" .smillimited and route chart to \"[facility] \" (Giving one month refill in non controlled medications is strongly recommended before denial)    If refill has been denied, meaning absolutely no refills without visit, please complete the smart phrase \".smirxrefuse\" and route it to the \"[facility] MED REFILLS\"  pool to inform the patient and the pharmacy.    Eneida Calixto MA        "

## 2021-02-16 ENCOUNTER — TRANSFERRED RECORDS (OUTPATIENT)
Dept: HEALTH INFORMATION MANAGEMENT | Facility: CLINIC | Age: 26
End: 2021-02-16

## 2021-02-17 ENCOUNTER — MYC MEDICAL ADVICE (OUTPATIENT)
Dept: FAMILY MEDICINE | Facility: CLINIC | Age: 26
End: 2021-02-17

## 2021-02-17 DIAGNOSIS — F64.0 GENDER DYSPHORIA IN ADULT: Primary | Chronic | ICD-10-CM

## 2021-02-22 NOTE — TELEPHONE ENCOUNTER
Ordered future labs.   Will send message to patient to schedule laboratory only appointment at their convenience.   Josie Macias MD

## 2021-03-02 DIAGNOSIS — F64.0 GENDER DYSPHORIA IN ADULT: Chronic | ICD-10-CM

## 2021-03-02 LAB
ALBUMIN SERPL-MCNC: 4.6 MG/DL (ref 3.8–5)
ALP SERPL-CCNC: 40 U/L (ref 31.7–110.5)
ALT SERPL-CCNC: 20.7 U/L (ref 0–45)
AST SERPL-CCNC: 15.6 U/L (ref 0–45)
BILIRUB SERPL-MCNC: <0.4 MG/DL (ref 0.2–1.3)
BUN SERPL-MCNC: 10.5 MG/DL (ref 7–19)
CALCIUM SERPL-MCNC: 9.1 MG/DL (ref 8.5–10.1)
CHLORIDE SERPLBLD-SCNC: 100.2 MMOL/L (ref 98–110)
CHOLEST SERPL-MCNC: 179.9 MG/DL (ref 0–200)
CHOLEST/HDLC SERPL: 3.8 {RATIO} (ref 0–5)
CO2 SERPL-SCNC: 29.9 MMOL/L (ref 20–32)
CREAT SERPL-MCNC: 0.7 MG/DL (ref 0.5–1)
ESTRADIOL SERPL-MCNC: 36 PG/ML
GFR SERPL CREATININE-BSD FRML MDRD: >90 ML/MIN/1.7 M2
GLUCOSE SERPL-MCNC: 97.9 MG'DL (ref 70–99)
GLUCOSE SERPL-MCNC: 99.4 MG'DL (ref 70–99)
HDLC SERPL-MCNC: 46.8 MG/DL
HEMOGLOBIN: 13.7 G/DL (ref 11.7–15.7)
LDLC SERPL CALC-MCNC: 84 MG/DL (ref 0–129)
POTASSIUM SERPL-SCNC: 3.8 MMOL/L (ref 3.3–4.5)
PROT SERPL-MCNC: 7.4 G/DL (ref 6.8–8.8)
SODIUM SERPL-SCNC: 136.5 MMOL/L (ref 132.6–141.4)
TRIGL SERPL-MCNC: 244.3 MG/DL (ref 0–150)
VLDL CHOLESTEROL: 48.9 MG/DL (ref 7–32)

## 2021-03-02 PROCEDURE — 85018 HEMOGLOBIN: CPT

## 2021-03-02 PROCEDURE — 84403 ASSAY OF TOTAL TESTOSTERONE: CPT | Performed by: FAMILY MEDICINE

## 2021-03-02 PROCEDURE — 99000 SPECIMEN HANDLING OFFICE-LAB: CPT | Performed by: FAMILY MEDICINE

## 2021-03-02 PROCEDURE — 80061 LIPID PANEL: CPT

## 2021-03-02 PROCEDURE — 82670 ASSAY OF TOTAL ESTRADIOL: CPT | Performed by: FAMILY MEDICINE

## 2021-03-02 PROCEDURE — 36415 COLL VENOUS BLD VENIPUNCTURE: CPT

## 2021-03-02 PROCEDURE — 80053 COMPREHEN METABOLIC PANEL: CPT

## 2021-03-02 PROCEDURE — 82947 ASSAY GLUCOSE BLOOD QUANT: CPT | Mod: 59

## 2021-03-02 RX ORDER — ESTRADIOL 2 MG/1
2 TABLET ORAL 2 TIMES DAILY
Qty: 180 TABLET | Refills: 1 | Status: SHIPPED | OUTPATIENT
Start: 2021-03-02 | End: 2021-11-24

## 2021-03-02 NOTE — TELEPHONE ENCOUNTER
"Request for medication refill: estradiol (ESTRACE) 2 MG tablet    Providers if patient needs an appointment and you are willing to give a one month supply please refill for one month and  send a letter/MyChart using \".SMILLIMITEDREFILL\" .smillimited and route chart to \"P [facility] \" (Giving one month refill in non controlled medications is strongly recommended before denial)    If refill has been denied, meaning absolutely no refills without visit, please complete the smart phrase \".smirxrefuse\" and route it to the \"P [facility] MED REFILLS\"  pool to inform the patient and the pharmacy.    Carmen Hardy MA        "
Verify that the refill encounter hasn't been started Yes    [facility] Family Medicine phone call message- patient requesting a refill:    Full Medication Name: estradiol (ESTRACE) 2 MG tablet    Dose: Route: Take 1 tablet (2 mg) by mouth 2 times daily - Oral     [facility]arm[facility] confirmed [facility]       [id number] [place] [place] - [address] [address]  Phone: [phone] Fax: [phone]  : Yes    Medication tab checked to see if medication has been sent  Yes    Additional Comments:      OK to leave a message on voice mail? Yes    Advised patient refill may take up to 2 business days? Yes    Primary language: English      needed? No    Call taken on March 2, 2021 at 2:15 PM by April Nataliia    Route to [facility] MED REFILL      
[phone]

## 2021-03-04 ENCOUNTER — MYC MEDICAL ADVICE (OUTPATIENT)
Dept: FAMILY MEDICINE | Facility: CLINIC | Age: 26
End: 2021-03-04

## 2021-03-04 LAB — TESTOST SERPL-MCNC: 27 NG/DL (ref 8–60)

## 2021-03-12 ENCOUNTER — OFFICE VISIT (OUTPATIENT)
Dept: FAMILY MEDICINE | Facility: CLINIC | Age: 26
End: 2021-03-12
Payer: COMMERCIAL

## 2021-03-12 ENCOUNTER — TELEPHONE (OUTPATIENT)
Dept: DERMATOLOGY | Facility: CLINIC | Age: 26
End: 2021-03-12

## 2021-03-12 VITALS
TEMPERATURE: 98.6 F | OXYGEN SATURATION: 95 % | BODY MASS INDEX: 32.44 KG/M2 | SYSTOLIC BLOOD PRESSURE: 107 MMHG | RESPIRATION RATE: 16 BRPM | DIASTOLIC BLOOD PRESSURE: 72 MMHG | HEIGHT: 69 IN | HEART RATE: 73 BPM | WEIGHT: 219 LBS

## 2021-03-12 DIAGNOSIS — F41.9 ANXIETY: Chronic | ICD-10-CM

## 2021-03-12 DIAGNOSIS — Z29.9 PREVENTIVE MEASURE: ICD-10-CM

## 2021-03-12 DIAGNOSIS — G40.822 NONINTRACTABLE EPILEPTIC SPASMS WITHOUT STATUS EPILEPTICUS (H): Primary | Chronic | ICD-10-CM

## 2021-03-12 DIAGNOSIS — E66.811 CLASS 1 OBESITY DUE TO EXCESS CALORIES WITHOUT SERIOUS COMORBIDITY WITH BODY MASS INDEX (BMI) OF 32.0 TO 32.9 IN ADULT: ICD-10-CM

## 2021-03-12 DIAGNOSIS — E66.09 CLASS 1 OBESITY DUE TO EXCESS CALORIES WITHOUT SERIOUS COMORBIDITY WITH BODY MASS INDEX (BMI) OF 32.0 TO 32.9 IN ADULT: ICD-10-CM

## 2021-03-12 DIAGNOSIS — F33.1 MODERATE EPISODE OF RECURRENT MAJOR DEPRESSIVE DISORDER (H): Chronic | ICD-10-CM

## 2021-03-12 DIAGNOSIS — F64.0 GENDER DYSPHORIA IN ADULT: Chronic | ICD-10-CM

## 2021-03-12 DIAGNOSIS — L30.9 DERMATITIS: ICD-10-CM

## 2021-03-12 PROCEDURE — 90651 9VHPV VACCINE 2/3 DOSE IM: CPT | Performed by: FAMILY MEDICINE

## 2021-03-12 PROCEDURE — 99214 OFFICE O/P EST MOD 30 MIN: CPT | Mod: 25 | Performed by: FAMILY MEDICINE

## 2021-03-12 PROCEDURE — 90471 IMMUNIZATION ADMIN: CPT | Performed by: FAMILY MEDICINE

## 2021-03-12 PROCEDURE — 93000 ELECTROCARDIOGRAM COMPLETE: CPT | Mod: GC | Performed by: FAMILY MEDICINE

## 2021-03-12 RX ORDER — TRIAMCINOLONE ACETONIDE 1 MG/G
OINTMENT TOPICAL
Qty: 80 G | Refills: 0 | Status: SHIPPED | OUTPATIENT
Start: 2021-03-12 | End: 2023-11-15

## 2021-03-12 ASSESSMENT — MIFFLIN-ST. JEOR: SCORE: 1797.76

## 2021-03-12 NOTE — LETTER
"March 18, 2021      Kun Boyle  [address] [address]        Dear Kun,    Thank you for getting your care at [facility]. Please see below for your test results.    No results found from the In Basket message.    {[facility] FOLLOW UP LTR:[id number]::\"If you have any concerns about these results please call and leave a message for me or send a MyChart message to the clinic.\"}    Sincerely,    Alex [place]      "

## 2021-03-12 NOTE — PROGRESS NOTES
[facility]      Assessment & Plan     Kun is a 26 year old transgender female / male-to-female, who was assessed for the following problems:    Problem List Items Addressed This Visit        High    Epilepsy - Primary (Chronic)     Follows with neurology at [facility].  Has not been able to control seizures with Keppra, so transition back to Vimpat.  Neurology requested Vimpat levels and EKG.  EKG done in clinic and interpreted by myself and attending, normal sinus rhythm, no prolonged QT interval or other abnormalities  -Vimpat levels          Relevant Orders    EKG 12-lead complete w/read - Clinics (Completed)    Class 1 obesity due to excess calories without serious comorbidity with body mass index (BMI) of 32.0 to 32.9 in adult (Chronic)     Weight gain since starting HRT, advised decreasing exercise, fresh vegetables and fruits given slightly increased glucose, VLDL/triglycerides.  - A1c            Medium    Gender dysphoria in adult - trangender female (Chronic)     Doing well frustrated over inability to do electrolysis due to COVID-19 restrictions.  Seems to have restarted this treatment, encouraged her to follow-up with dermatology or Dr. Butler about it, with goal to undergo bottom surgery         Anxiety (Chronic)    Depression (Chronic)     Mood much, much improved. No side effects reported. Will increase fluoxetine dose.          Relevant Medications    FLUoxetine (PROZAC) 20 MG capsule       Low    Dermatitis     Not using topical steroid consistently. Refilling medication and advised to use twice daily for 7-10 days, then continue once weekly.          Relevant Medications    triamcinolone (KENALOG) 0.1 % external ointment      Other Visit Diagnoses     Preventive measure        Relevant Orders    HPV9 (Gardasil 9 ) (Completed)         Review of the result(s) of each unique test - CMP, HGB, lipid panel, estradiol, testosterone levels    Medications Discontinued During This Encounter   Medication  "Reason     FLUoxetine (PROZAC) 10 MG capsule Therapy completed     Return in about 3 months (around 6/12/2021).     Options for treatment and follow-up care were reviewed with the patient/caregivers. They engaged in the decision making process and verbalized understanding of the options discussed and agreed with the final plan.    Josie Macias MD  Family Medicine Resident [facility], PGY-3  Phone: [phone]    [facility]    Subjective   History obtained from patient and chart review.  Patient speaks English,  was not present for this visit.      Kun Boyle is a 26 year old adult, who presents with:  Chief Complaint   Patient presents with     RECHECK     get EKG and labs done, no other concerns     Refill Request     kenolog cream        Last seizure 2 weeks ago, as they were transitioning from Keppra to vimpat, was a few hours late on taking medications as they slept through. Following with neurology at [facility]      Mood has been better, just anxious about having another seizure, depression is better, started to play bass   Has not been able to do psychotherapy, would like resources to find a counselor   Girlfriend has BPD, has not been getting treatment regularly as she's afraid that Kun will have a seizure while she's gone    ROS  7-point ROS negative except as described above.  PHQ 11/24/2020 1/26/2021   PHQ-9 Total Score 22 8   Q9: Thoughts of better off dead/self-harm past 2 weeks More than half the days Several days   F/U: Thoughts of suicide or self-harm Yes No   F/U: Self harm-plan Yes -   F/U: Self-harm action No -   F/U: Safety concerns Yes Yes     TODD-7 SCORE 11/24/2020 1/26/2021   Total Score 19 (severe anxiety) 5 (mild anxiety)   Total Score 19 5       Objective     Physical Exam  /72   Pulse 73   Temp 98.6  F (37  C) (Oral)   Resp 16   Ht 1.753 m (5' 9\")   Wt 99.3 kg (219 lb)   SpO2 95%   BMI 32.34 kg/m      Vitals were reviewed and were normal "     General: well appearing woman, no acute distress.    HEENT: no signs of trauma. No icterus or conjunctival injection. Pupils are equal.   Resp: normal work of breathing.    Neuro: alert and interactive. No gross focal findings.   Skin: no lesions noted on exposed skin.  Psych: affect is concordant with mood, behavior is normal.     Results  Orders Only on 03/02/2021   Component Date Value Ref Range Status     Estradiol 03/02/2021 36  pg/mL Final     Glucose 03/02/2021 99.4* 70.0 - 99.0 mg'dL Final     Cholesterol 03/02/2021 179.9  0.0 - 200.0 mg/dL Final     Cholesterol/HDL Ratio 03/02/2021 3.8  0.0 - 5.0 Final     HDL Cholesterol 03/02/2021 46.8  >40.0 mg/dL Final     Triglycerides 03/02/2021 244.3* 0.0 - 150.0 mg/dL Final     VLDL Cholesterol 03/02/2021 48.9* 7.0 - 32.0 mg/dL Final     LDL Cholesterol Calculated 03/02/2021 84  0 - 129 mg/dL Final     Hemoglobin 03/02/2021 13.7  11.7 - 15.7 g/dL Final     Calcium 03/02/2021 9.1  8.5 - 10.1 mg/dL Final     Chloride 03/02/2021 100.2  98.0 - 110.0 mmol/L Final     Carbon Dioxide 03/02/2021 29.9  20.0 - 32.0 mmol/L Final     Creatinine 03/02/2021 0.7  0.5 - 1.0 mg/dL Final     Glucose 03/02/2021 97.9  70.0 - 99.0 mg'dL Final     Potassium 03/02/2021 3.8  3.3 - 4.5 mmol/L Final     Sodium 03/02/2021 136.5  132.6 - 141.4 mmol/L Final     Protein Total 03/02/2021 7.4  6.8 - 8.8 g/dL Final     GFR Estimate 03/02/2021 >90  >60.0 mL/min/1.7 m2 Final     GFR Estimate If Black 03/02/2021 >90  >60.0 mL/min/1.7 m2 Final     Albumin 03/02/2021 4.6  3.8 - 5.0 mg/dL Final     Alkaline Phosphatase 03/02/2021 40.0  31.7 - 110.5 U/L Final     ALT 03/02/2021 20.7  0.0 - 45.0 U/L Final     AST 03/02/2021 15.6  0.0 - 45.0 U/L Final     Bilirubin Total 03/02/2021 <0.4  0.2 - 1.3 mg/dL Final     Urea Nitrogen 03/02/2021 10.5  7.0 - 19.0 mg/dL Final     Testosterone Total 03/02/2021 27  8 - 60 ng/dL Final

## 2021-03-12 NOTE — TELEPHONE ENCOUNTER
[facility] Call Center    Phone Message    May a detailed message be left on voicemail: yes     Reason for Call: The patient called to resume LHR. Please advise. Thank you.    Action Taken: Message routed to:  Adult Clinics: Dermatology p [phone]    Travel Screening: Not Applicable

## 2021-03-12 NOTE — LETTER
3/12/2021      RE: Kun Boyle  [address] [facility] [address]       [facility] visit      Assessment & Plan     Kun is a 26 year old transgender female / male-to-female, who was assessed for the following problems:    Problem List Items Addressed This Visit        High    Epilepsy - Primary (Chronic)     [place] [facility]  Has not been able to control seizures with Keppra, so transition back to Vimpat.  Neurology requested Vimpat levels and EKG.  EKG done in clinic and interpreted by myself and attending, normal sinus rhythm, no prolonged QT interval or other abnormalities  -Vimpat levels          Relevant Orders    EKG 12-lead complete w/read - Clinics (Completed)    Class 1 obesity due to excess calories without serious comorbidity with body mass index (BMI) of 32.0 to 32.9 in adult (Chronic)     Weight gain since starting HRT, advised decreasing exercise, fresh vegetables and fruits given slightly increased glucose, VLDL/triglycerides.  - A1c            Medium    Gender dysphoria in adult - trangender female (Chronic)     Doing well frustrated over inability to do electrolysis due to COVID-19 restrictions.  Seems to have restarted this treatment, encouraged her to follow-up with dermatology or Dr. Butler about it, with goal to undergo bottom surgery         Anxiety (Chronic)    Depression (Chronic)     Mood much, much improved. No side effects reported. Will increase fluoxetine dose.          Relevant Medications    FLUoxetine (PROZAC) 20 MG capsule       Low    Dermatitis     Not using topical steroid consistently. Refilling medication and advised to use twice daily for 7-10 days, then continue once weekly.          Relevant Medications    triamcinolone (KENALOG) 0.1 % external ointment      Other Visit Diagnoses     Preventive measure        Relevant Orders    HPV9 (Gardasil 9 ) (Completed)         Review of the result(s) of each unique test - CMP, HGB, lipid panel, estradiol,  "testosterone levels    Medications Discontinued During This Encounter   Medication Reason     FLUoxetine (PROZAC) 10 MG capsule Therapy completed     Return in about 3 months (around 6/12/2021).     Options for treatment and follow-up care were reviewed with the patient/caregivers. They engaged in the decision making process and verbalized understanding of the options discussed and agreed with the final plan.    Josie Macias MD  Family Medicine Resident [facility], PGY-3  Phone: [phone]    [facility]    Subjective   History obtained from patient and chart review.  Patient speaks English,  was not present for this visit.      Kun Boyle is a 26 year old adult, who presents with:  Chief Complaint   Patient presents with     RECHECK     get EKG and labs done, no other concerns     Refill Request     kenolog cream        Last seizure 2 weeks ago, as they were transitioning from Keppra to vimpat, was a few hours late on taking medications as they slept through. [place] [facility]      Mood has been better, just anxious about having another seizure, depression is better, started to play bass   Has not been able to do psychotherapy, would like resources to find a counselor   Girlfriend has BPD, has not been getting treatment regularly as she's afraid that Kun will have a seizure while she's gone    ROS  7-point ROS negative except as described above.  PHQ 11/24/2020 1/26/2021   PHQ-9 Total Score 22 8   Q9: Thoughts of better off dead/self-harm past 2 weeks More than half the days Several days   F/U: Thoughts of suicide or self-harm Yes No   F/U: Self harm-plan Yes -   F/U: Self-harm action No -   F/U: Safety concerns Yes Yes     TODD-7 SCORE 11/24/2020 1/26/2021   Total Score 19 (severe anxiety) 5 (mild anxiety)   Total Score 19 5       Objective     Physical Exam  /72   Pulse 73   Temp 98.6  F (37  C) (Oral)   Resp 16   Ht 1.753 m (5' 9\")   Wt 99.3 kg " (219 lb)   SpO2 95%   BMI 32.34 kg/m      Vitals were reviewed and were normal     General: well appearing woman, no acute distress.    HEENT: no signs of trauma. No icterus or conjunctival injection. Pupils are equal.   Resp: normal work of breathing.    Neuro: alert and interactive. No gross focal findings.   Skin: no lesions noted on exposed skin.  Psych: affect is concordant with mood, behavior is normal.     Results  Orders Only on 03/02/2021   Component Date Value Ref Range Status     Estradiol 03/02/2021 36  pg/mL Final     Glucose 03/02/2021 99.4* 70.0 - 99.0 mg'dL Final     Cholesterol 03/02/2021 179.9  0.0 - 200.0 mg/dL Final     Cholesterol/HDL Ratio 03/02/2021 3.8  0.0 - 5.0 Final     HDL Cholesterol 03/02/2021 46.8  >40.0 mg/dL Final     Triglycerides 03/02/2021 244.3* 0.0 - 150.0 mg/dL Final     VLDL Cholesterol 03/02/2021 48.9* 7.0 - 32.0 mg/dL Final     LDL Cholesterol Calculated 03/02/2021 84  0 - 129 mg/dL Final     Hemoglobin 03/02/2021 13.7  11.7 - 15.7 g/dL Final     Calcium 03/02/2021 9.1  8.5 - 10.1 mg/dL Final     Chloride 03/02/2021 100.2  98.0 - 110.0 mmol/L Final     Carbon Dioxide 03/02/2021 29.9  20.0 - 32.0 mmol/L Final     Creatinine 03/02/2021 0.7  0.5 - 1.0 mg/dL Final     Glucose 03/02/2021 97.9  70.0 - 99.0 mg'dL Final     Potassium 03/02/2021 3.8  3.3 - 4.5 mmol/L Final     Sodium 03/02/2021 136.5  132.6 - 141.4 mmol/L Final     Protein Total 03/02/2021 7.4  6.8 - 8.8 g/dL Final     GFR Estimate 03/02/2021 >90  >60.0 mL/min/1.7 m2 Final     GFR Estimate If Black 03/02/2021 >90  >60.0 mL/min/1.7 m2 Final     Albumin 03/02/2021 4.6  3.8 - 5.0 mg/dL Final     Alkaline Phosphatase 03/02/2021 40.0  31.7 - 110.5 U/L Final     ALT 03/02/2021 20.7  0.0 - 45.0 U/L Final     AST 03/02/2021 15.6  0.0 - 45.0 U/L Final     Bilirubin Total 03/02/2021 <0.4  0.2 - 1.3 mg/dL Final     Urea Nitrogen 03/02/2021 10.5  7.0 - 19.0 mg/dL Final     Testosterone Total 03/02/2021 27  8 - 60 ng/dL Final            Preceptor Attestation:   Patient seen and discussed with the resident. Assessment and plan reviewed with resident and agreed upon.   Supervising Physician:  DO [facility]          Josie Macias MD

## 2021-03-12 NOTE — PATIENT INSTRUCTIONS
Here is the summary from today's visit.    Kun was seen today for recheck and refill request.    Diagnoses and all orders for this visit:    Epilepsy  -     Lacosamide Level  -     EKG 12-lead complete w/read - Clinics    Dermatitis  -     triamcinolone (KENALOG) 0.1 % external ointment; Apply twice daily to rash on abdomen    Anxiety    Moderate episode of recurrent major depressive disorder (H)    Depressed mood  -     FLUoxetine (PROZAC) 20 MG capsule; Take 1 capsule (20 mg) by mouth daily    Gender dysphoria in adult - trangender female      Follow up plan: Return in about 3 months (around 6/12/2021).     Thank you for choosing [facility]!  Josie Macias MD   >>>>> <<<<<  If you had laboratory testing and results need quick action we will call you at # [phone] (home) . If this is not the best number, please call our clinic or stop by the  to update your contact information.  If you need any refills please call your pharmacy and they will contact us. If you need to  your refill at a new pharmacy, please contact the new pharmacy directly. The new pharmacy will help you get your medications transferred faster.   If you have any concerns about today's visit or wish to schedule another appointment, please call our office during normal business hours [phone] (8-5:00 M-F)  If a referral was made to a [facility] provider, and you don't get a call from central scheduling, please call [phone].    If you have urgent medical concerns please call [phone] at any time of the day.    Individual Mental Health Practitioners   Therapist Children Adolescents Adults Family Other   Tracie Arauz PsyD, LP  [address]  [place]  [address]  PH: [phone]   YES YES    Stephanie Tamez PsyD, [facility]  [facility]  [address]  [place], [place]   PH:[phone] YES YES YES YES Business consulting   Nitin Velasquez, PhD,  LP  [address]  [place] [address]  PH: [phone]  [employer].Microstim  Older Adolescents YES  On busline   Sarabjit Lopez, [facility]  [address]  [place] [address]  PH: [phone]  [email or web address]  YES YES YES Busline   GABY SullivanEd, [facility] Center[facility], LADC  [address]  [place], [place]   [phone]  [email or web address]   YES YES  Experienced in Trans Veterans    Shira Salazar MA, LMFT, CST  [address]  [place], [place] [address] [phone]  [employer]  YES YES YES Sex positive therapy    Kathy(Medanales) Mikal Sauer, MSW,LICSW  [address] [place] [address]  Phone: [phone]     YES YES YES    LILIANA Fischer MFA, PsDax, LP   [address]  [address]  [place] [address]  [phone]  [employer]   YES YES Sexuality groups  [facility]        Mental Health Clinics   [facility]  Patients: Children, adolescents, adults  Locations:  [address]  [place] [address]  [address]  [place] [address]  [address]  [place] [address]  [place] [address]    Appointment Scheduling   [phone]  [email or web address]  [facility]  All ages- Also offers in-home therapy and sliding fee  [address]  [place] [address]   Phone: [phone]  Access Systems    [facility] [place] [address]  Intake line [phone]  http://www.[facility].org/programsservices/counseling/transgender-mental-health/    Transgender Mental Health Team  Therapists Children Adolescents Adults Family Other information        Korean and English  [facility] Location- [facility]   Sweta Carrasquillo MA, LMFT YES YES YES  [facility] hospitals in the [place]   JOHN Dudley, LICSW   YES YES EMDR  [facility]     Manny Bliss MA, LP YES YES YES YES  [facility] Location- [facility] [address] Sexual medicine-Psychology  [email or web address]  [place] Location   [address] [place] [address]  Phone [phone]  Therapists Children Adolescents Adults Family Other   Brittaney Navarro PsDax, LP   YES YES-couples    Jonathan OconnoryD, LP   YES     Keyla Kam, PhD, LP   YES       [place] Location  [address] [place] [address]  Phone: [phone]  Therapists Children Adolescents Adults Family Other   Lauren Jonathan PatelyD, LP   YES YES    Inga Ball PsyD, LP   YES       RECLAIM  Patients-Queer and Trans Youth and Family counseling serving ages 12-26  [address] [place] [address]  Phone: [phone]  http://Reclaim.[facility], [facility]  [employer] [facility]  [address]  [place] [address]  Main line: [phone]  http://Connect HQ.[employer]    [facility] for Sexual Health/ Program in Human Sexuality  [address]  [place] [address]  PH: [phone]   https://www.sexualhealth.[facility]/clinic-center-sexual-health/transgender-health-services    Therapists Children Adolescents Adults Family Other information   Maile Gresham PsDax YES YES YES     Tiffany Martinez, PhD YES YES YES     Elen Fonseca PsyD  YES YES     Aleah Avalos, PhD YES YES YES     Damien Heller, PhD, MPH YES YES   Sexual and Gender minority health   Multiple other Post-Doctoral fellows practicing at this location as well           [facility]  [place] Location: [address] [address] [place] [address]  [place] Location: [address] [address]  Toll Free: [phone]  Phone: [phone]   http://www.[facility]/therapeutic-services    Daytime LGBTQ+ DBT skills group  Groups are being added at [facility], in addition to the current evening LGBTQ+ skills group. Both skills groups are held on  Mondays.  Daytime group will be 1:00pm-3:30pm and evening group (currently full) is 5:00pm-7:30pm.  [facility] offers a certified DBT program. Clients commit to one year of participation in skills group and individual DBT therapy. DBT individual therapy can be done by current therapist if the therapist is intensively trained and part of consultation group of intensively trained therapists, and if the therapist offers 24/7 coaching calls. Otherwise, the client can work with one of our DBT therapists and then return to their previous individual therapist at the conclusion of the 1-year program.  MWP Member Cynthia Garvin MA, [facility] for Psychology   [phone]  www.[email or web address]  [address], [facility] Health Resources    [facility]  [address] [place] [address]  Phone: [phone] or toll free, [phone]  [email or web address]    [facility]- LGBT Residential CD Treatment Program  [address] [place] [address]  [phone]  http://www.[employer]/programs/residential/lgravtvws-raqj-lexdtmwzrsa-[facility]-[place]/    Additional Provider Directories  [facility] Initiative- Provider Directory for all services  http://www.[facility].org/resources-for-you/provider-directory/    [place]'s lesbian díaz bisexual transgender & allied mental health providers' network  http://www.lgbttherapists.org/    Provider Directory for Health Related resources in [place]  http://mnlgbtqdirectory.org    Directory for kink, Polyamory, gender non-conforming aware Providers  http://www.kinkaware.org

## 2021-03-13 PROBLEM — L30.9 DERMATITIS: Status: ACTIVE | Noted: 2021-03-13

## 2021-03-13 PROBLEM — E66.811 CLASS 1 OBESITY DUE TO EXCESS CALORIES WITHOUT SERIOUS COMORBIDITY WITH BODY MASS INDEX (BMI) OF 32.0 TO 32.9 IN ADULT: Chronic | Status: ACTIVE | Noted: 2021-03-13

## 2021-03-13 PROBLEM — E66.09 CLASS 1 OBESITY DUE TO EXCESS CALORIES WITHOUT SERIOUS COMORBIDITY WITH BODY MASS INDEX (BMI) OF 32.0 TO 32.9 IN ADULT: Chronic | Status: ACTIVE | Noted: 2021-03-13

## 2021-03-13 PROBLEM — E66.09 CLASS 1 OBESITY DUE TO EXCESS CALORIES WITHOUT SERIOUS COMORBIDITY WITH BODY MASS INDEX (BMI) OF 32.0 TO 32.9 IN ADULT: Status: ACTIVE | Noted: 2021-03-13

## 2021-03-13 PROBLEM — E66.811 CLASS 1 OBESITY DUE TO EXCESS CALORIES WITHOUT SERIOUS COMORBIDITY WITH BODY MASS INDEX (BMI) OF 32.0 TO 32.9 IN ADULT: Status: ACTIVE | Noted: 2021-03-13

## 2021-03-13 PROBLEM — G40.822: Chronic | Status: ACTIVE | Noted: 2019-03-13

## 2021-03-13 NOTE — ASSESSMENT & PLAN NOTE
Weight gain since starting HRT, advised decreasing exercise, fresh vegetables and fruits given slightly increased glucose, VLDL/triglycerides.  - A1c

## 2021-03-13 NOTE — ASSESSMENT & PLAN NOTE
Not using topical steroid consistently. Refilling medication and advised to use twice daily for 7-10 days, then continue once weekly.

## 2021-03-13 NOTE — ASSESSMENT & PLAN NOTE
[place] with [facility]y at [facility].  Has not been able to control seizures with Keppra, so transition back to Vimpat.  Neurology requested Vimpat levels and EKG.  EKG done in clinic and interpreted by myself and attending, normal sinus rhythm, no prolonged QT interval or other abnormalities  -Vimpat levels ordered

## 2021-03-13 NOTE — ASSESSMENT & PLAN NOTE
Doing well frustrated over inability to do electrolysis due to COVID-19 restrictions.  Seems to have restarted this treatment, encouraged her to follow-up with dermatology or Dr. Butler about it, with goal to undergo bottom surgery

## 2021-03-17 NOTE — PROGRESS NOTES
Preceptor Attestation:   Patient seen and discussed with the resident. Assessment and plan reviewed with resident and agreed upon.  I personally reviewed the EKG and agree with findings by Dr. Macias.      Supervising Physician:  DO [facility]

## 2021-04-25 ENCOUNTER — HEALTH MAINTENANCE LETTER (OUTPATIENT)
Age: 26
End: 2021-04-25

## 2021-05-11 ENCOUNTER — TELEPHONE (OUTPATIENT)
Dept: DERMATOLOGY | Facility: CLINIC | Age: 26
End: 2021-05-11

## 2021-05-11 NOTE — TELEPHONE ENCOUNTER
I left a message for patient to call [facility].  Is she still interested in resuming LHR services for vaginoplasty prep?    She completed 1 treatment in Jan 2020.  Has she any LHR or electrolysis done elsewhere since then?    Is her insurance still Blue Plus?    Gilma Sanderson RN

## 2021-05-17 NOTE — TELEPHONE ENCOUNTER
I spoke with Kun regarding resuming laser hair removal for vaginoplasty preparation.  She would like to proceed with treatment.  She has not had any further LHR or electrolysis since her last treatment with us in Jan 2020.  Her insurance is Blue Plus.    I notified her that I would have our financial counselors submit a benefit investigation through insurance and I would call her back once that has been completed.  She prefers to be contacted via [employer].    Gilma Sanderson RN

## 2021-05-18 NOTE — TELEPHONE ENCOUNTER
PB DOS: TBD  Type of Procedure: LHR  CPT Codes: 02406  ICD10 Codes: F64.9  Surgeon/Ordering provider: Dr. Biggs  Pre-cert/Authorization completed:  Yes, Approved for genital area  Payer: blue plus [place]  Date Checked: 5/18/2021  Spoke to Av[employer] portal  Ref. # [id number], Tracking ID # [id number]  / Auth # [id number]  Valid Dates: 5/24/2021-11/24/2021

## 2021-06-18 ENCOUNTER — OFFICE VISIT (OUTPATIENT)
Dept: DERMATOLOGY | Facility: CLINIC | Age: 26
End: 2021-06-18
Payer: COMMERCIAL

## 2021-06-18 DIAGNOSIS — F64.9 GENDER DYSPHORIA: Primary | ICD-10-CM

## 2021-06-18 PROCEDURE — 17999 UNLISTD PX SKN MUC MEMB SUBQ: CPT | Mod: 58 | Performed by: DERMATOLOGY

## 2021-06-18 RX ORDER — LACOSAMIDE 200 MG/1
200 TABLET ORAL 2 TIMES DAILY
COMMUNITY

## 2021-06-18 NOTE — PROCEDURES
Laser Hair Removal Gentle Max Prox (755nm)  Procedure Date: 2021    Staff Surgeon: Dr. Kimberlee Keyes     Assistant: Zayda Meyer RN and Gilma Sanderson RN    Trimming required prior to treatment in clinic?: Yes  Cheng skin type: II  Diagnosis/Treatment Reason: gender dysphoria    Treatment#: 2     Test Area Settings:   Wavelength(755/1064nm): 755  Location: 12J left superior side of penile shaft and scrotum, 14J left inferior side of penile shaft and scrotum   Spot size: 18  Pulse width:  3 mS ( mS)   Total Number of Pulses: 4  Dynamic cooling spray settin mS  Dynamic cooling device delay:  30 mS    Laser Settings:  Wavelength(755/1064nm):755  Location: mons pubis, bilateral inguinal creases, perineum  Fluence: 14J   Spot size: 18mm  Pulse width:  3 mS ( mS)   Total Number of Pulses: 113  Dynamic cooling spray settin mS  Dynamic cooling device delay:  30 mS    Laser Settings:  Wavelength(755/1064nm): 755  Location: penile shaft, scrotum  Fluence: 12J   Spot size: 18mm  Pulse width:  3 mS ( mS)   Total Number of Pulses: 15  Dynamic cooling spray settin mS  Dynamic cooling device delay:  30 mS    Anesthesia:  Topical LMX, LOT# [id number], Expiration 2023    Celia used?:No  Ice used?: No    Description of Operation/Procedure:   The risks were again reviewed including skin hyperpigmentation, hypopigmentation, scarring, bruising, and blistering. Reviewed white and red hair do not respond. Reviewed hair may regrow as it is not permanent but a reduction in hair growth.  Reviewed that a 3 months waiting period prior to surgery to recommended. Reviewed that the patient can terminate the procedure at anytime.     A  operative consent were obtained. Time-out was performed.  The patient was positioned to optimally expose the area treated. Dynamic cooling was verified and functioning properly.  Protective eyewear was worn by the patient and goggles on all personnel in the treatment room.  The  patient confirmed the site to be treated. The laser energy output was verified by meter reading.  N95 and buffalo filtration was used.     The clinically evident lesion(s) was/were treated with laser beam as above with 1 pass. The patient tolerated the procedure well and no complications were noted. Post operative instructions were provided. The total laser operation and preparation time was 10 minutes.  The patient was counseled to contact us immediately for any concerns. The patient was offered and recommended to read their after visit summary.     Numeric pain scale after treatment: 5/10    The patient will follow-up in 6 weeks. Appointment made today for 7/29/2021    Billed to insurance.     Dr. Keyes staffed the patient was present for identifying and marking target area(s), ascertainment of protective equipment utilization, calibration of laser, and initial test pulses.     I staffed the patient and was present for guzmán portions    Kimberlee Keyes MD    Department of Dermatology  [facility]: Phone: [phone], Fax:[phone]  [facility]: Phone: [phone], Fax: [phone]      Staff Involved:  Zayda Meyer, RN and Gilma Sanderson RN

## 2021-06-18 NOTE — PATIENT INSTRUCTIONS
Gentle Max Laser Hair Reduction Instructions    Laser hair reduction: I will have redness, pain and swelling. I may have skin discoloration, bruising and itching. Risks are permenant discoloration, hives, infection scarring, eye injury,hair growth, and blister. The outcome could be no improvement or slight improvement. Multiple treatments are required. All hair will not be removed.     Before the procedure:  Sun Protection:  Do not allow the area to become tan or come in with a tan for a treatment. Tans will increased the risks of the procedure. Do not use spray or any over counter product self tanners prior to the procedure.     Shaving:   Gently shave the area the evening before the procedure.     Other:  Avoid any retinols such as Differin, adapalene, retinol or tretinoin to the treated area 1 week prior. Hold bleaching creams such as hydroquinone until 1 week prior.  Avoid any hair removal procedures such as waxing, electrolysis or other lasers on the skin within 6 weeks prior. Do not have any other cosmetic procedures done on the area within the prior 6 weeks such as chemical peels or dermabrasion.      Post Procedure:  Day 1-7  The healing time for any given treatment varies between clients. The following represents the general recovery phases you might expect. Individual clients may experience variations from this course.     Swelling/Discomfort/Redness:  The most common side effects are erythema and edema(redness and swelling) which generally occur immediately after and typically resolve within 48 hours. If, crusting and blistering occurs call the clinic.       Activity:  Avoid swimming the day of laser hair removal treatment. Also, no rigorous exercise the day of treatment.     Moisturizers and Sunscreens:  Wait until the skin has returned to normal to start topical products.     Sun Protection:  Do not allow the area to become tan or come in with a tan for a treatment. Tans will increased the risks of the  procedure. Do not use spray or any over counter product self tanners prior to the procedure.     Warning signs:  Call the clinic if you have significant pain, increasing redness, pus, blisters/crusting or for any other concerns.     Who should I call with questions?    [facility]: [phone]     [facility]: [phone]    For urgent needs outside of business hours call the [facility] at [phone] and ask for the dermatology resident on call    [employer] messaging response may be delayed by several days

## 2021-06-18 NOTE — PROGRESS NOTES
1 gram LMX applied to mons pubis, bilateral inguinal creases, scrotum, penile shaft and perineum    Zayda Meyer RN and Gimla Sanderson RN

## 2021-06-18 NOTE — NURSING NOTE
Dermatology Laser Intake Checklist:  History of psoriasis:No  History of recent tan, indoor or outdoor tanning/vacation or other sun exposure: No  History of vitiligo:No  Family history of vitiligo:No  Recent other cosmetic procedure(microderm abrasion/peel/hair removal/facial etc):No  History of HSV:No   Did the patient start valtrex: NA  For genital laser hair removal patient only: Is there a history of genital warts or condyloma:No  Tattoo in the area to be treated:No  Is patient using hydroquinone:No  Retinoids and other acne medications stopped for 2 weeks:No  Has the patient had accutane in the last 6-12 months:No  Pregnant or breastfeeding: No  History of skin cancer in area planned for treatment: No  History of treatment with gold:No  Changes in medical history: No  Photos obtained: No  Does the patient smoke:No  Is the patient on ibuprofen/aspirin/plavix/coumadin/other blood thinner: No  If patient is taking narcotic or diazepam(valium)-does patient have : No      Zayda Meyer RN

## 2021-06-18 NOTE — LETTER
6/18/2021         RE: Kun Boyle  [address] [facility] [address]        Dear Colleague,    Thank you for referring your patient, Kun Boyle, to the [facility]. Please see a copy of my visit note below.    1 gram LMX applied to mons pubis, bilateral inguinal creases, scrotum, penile shaft and perineum    Zayda Meyer RN and Gilma Sanderson RN      Again, thank you for allowing me to participate in the care of your patient.        Sincerely,        Kimberlee Keyes MD

## 2021-06-30 ENCOUNTER — TRANSFERRED RECORDS (OUTPATIENT)
Dept: HEALTH INFORMATION MANAGEMENT | Facility: CLINIC | Age: 26
End: 2021-06-30

## 2021-07-19 ENCOUNTER — DOCUMENTATION ONLY (OUTPATIENT)
Dept: FAMILY MEDICINE | Facility: CLINIC | Age: 26
End: 2021-07-19

## 2021-07-19 NOTE — PROGRESS NOTES
"When opening a documentation only encounter, be sure to enter in \"Chief Complaint\" Forms and in \" Comments\" Title of form, description if needed.    Kun is a 26 year old  adult  Form received via: Fax  Form now resides in: Provider Ready    Keyla Mcnulty CMA       Form has been completed by provider.     Form sent out via:   Patient informed:   Output date: July 19, 2021    Keyla Mcnulty CMA      **Please close the encounter**                      "

## 2021-07-29 ENCOUNTER — OFFICE VISIT (OUTPATIENT)
Dept: DERMATOLOGY | Facility: CLINIC | Age: 26
End: 2021-07-29
Payer: COMMERCIAL

## 2021-07-29 DIAGNOSIS — F64.9 GENDER DYSPHORIA: ICD-10-CM

## 2021-07-29 PROCEDURE — 99207 PR NO CHARGE NURSE ONLY: CPT | Performed by: DERMATOLOGY

## 2021-07-29 PROCEDURE — 17999 UNLISTD PX SKN MUC MEMB SUBQ: CPT | Mod: 58 | Performed by: DERMATOLOGY

## 2021-07-29 NOTE — LETTER
7/29/2021         RE: Kun Boyle  [address] [facility] [address]        Dear Colleague,    Thank you for referring your patient, Kun Boyle, to the [facility]. Please see a copy of my visit note below.    See procedure note.      Again, thank you for allowing me to participate in the care of your patient.        Sincerely,        Aspen Chapman MD

## 2021-07-29 NOTE — PATIENT INSTRUCTIONS
Gentle Max Laser Hair Reduction Instructions    Laser hair reduction: I will have redness, pain and swelling. I may have skin discoloration, bruising and itching. Risks are permenant discoloration, hives, infection scarring, eye injury,hair growth, and blister. The outcome could be no improvement or slight improvement. Multiple treatments are required. All hair will not be removed.     Before the procedure:  Sun Protection:  Do not allow the area to become tan or come in with a tan for a treatment. Tans will increased the risks of the procedure. Do not use spray or any over counter product self tanners prior to the procedure.     Shaving:   Gently shave the area the evening before the procedure.     Other:  Avoid any retinols such as Differin, adapalene, retinol or tretinoin to the treated area 1 week prior. Hold bleaching creams such as hydroquinone until 1 week prior.  Avoid any hair removal procedures such as waxing, electrolysis or other lasers on the skin within 6 weeks prior. Do not have any other cosmetic procedures done on the area within the prior 6 weeks such as chemical peels or dermabrasion.      Post Procedure:  Day 1-7  The healing time for any given treatment varies between clients. The following represents the general recovery phases you might expect. Individual clients may experience variations from this course.     Swelling/Discomfort/Redness:  The most common side effects are erythema and edema (redness and swelling) which generally occur immediately after and typically resolve within 48 hours. If crusting and blistering occurs call the clinic.     Activity:  Avoid swimming the day of laser hair removal treatment. Also, no rigorous exercise the day of treatment.     Moisturizers and Sunscreens:  Wait until the skin has returned to normal to start topical products.     Sun Protection:  Do not allow the area to become tan or come in with a tan for a treatment. Tans will increased the risks of the  procedure. Do not use spray or any over counter product self tanners prior to the procedure.     Warning signs:  Call the clinic if you have significant pain, increasing redness, pus, blisters/crusting or for any other concerns.     Who should I call with questions?    [facility]: [phone]    [facility]: [phone]    For urgent needs outside of business hours call the [facility] at [phone] and ask for the dermatology resident on call    [employer] messaging response may be delayed by several days

## 2021-07-29 NOTE — PROCEDURES
Laser Hair Removal Gentle Max Prox (755nm)  Procedure Date: 2021    Staff Surgeon: Dr. Aspen Chapman    Resident Surgeon: N/A    Assistant: Zayda Meyer RN & Dayna Pang RN    Trimming required prior to treatment in clinic?: Yes  Cheng skin type: II  Diagnosis/Treatment Reason: Gender dysphoria    Treatment #: 3    Test Area Settings:   Wavelength(755/1064nm): 755nm  Location: upper left mons pubis  Fluence: 16J   Spot size: 18mm  Pulse width:  3 mS ( mS)   Total Number of Pulses: 3  Dynamic cooling spray settin mS  Dynamic cooling device delay:  30 mS    Laser Settings:  Wavelength(755/1064nm): 755   Location: mons pubis, bilateral inguinal creases, perineum  Fluence: 16J   Spot size: 18mm  Pulse width:  3 mS ( mS)   Total Number of Pulses: 102  Dynamic cooling spray settin mS  Dynamic cooling device delay:  30 mS    Laser Settings:  Wavelength(755/1064nm): 755  Location: penile shaft and scrotum  Fluence: 14J  Spot size: 18mm  Pulse width:  3 mS ( mS)   Total Number of Pulses: 19  Dynamic cooling spray settin mS  Dynamic cooling device delay:  30 mS    Anesthesia:  Topical LMX 1G  Expiration: 2023  LOT: [id number]    Celia used?:No  Ice used?: No    Description of Operation/Procedure:   The risks were again reviewed including skin hyperpigmentation, hypopigmentation, scarring, bruising, and blistering. Reviewed white and red hair do not respond. Reviewed hair may regrow as it is not permanent but a reduction in hair growth.  Reviewed that a 3 months waiting period prior to surgery to recommended. Reviewed that the patient can terminate the procedure at anytime.     An operative consent were obtained. Time-out was performed.  The patient was positioned to optimally expose the area treated. Dynamic cooling was verified and functioning properly.  Protective eyewear was worn by the patient and goggles on all personnel in the treatment room.  The patient confirmed the site to  be treated. The laser energy output was verified by meter reading.  N95 and buffalo filtration was used.     The clinically evident lesion(s) was/were treated with laser beam as above with 1 pass.  The patient tolerated the procedure well and no complications were noted. Post operative instructions were provided. The total laser operation and preparation time was 15 minutes.  The patient was counseled to contact us immediately for any concerns. The patient was offered and recommended to read their after visit summary.     Numeric pain scale after treatment: 3/10    The patient will follow-up in 6 weeks.    Insurance was billed.     Staff Involved:  Staff Only    Zayda Meyer RN & Dayna Pang RN    I was present for key points of the procedure.    Aspen Chapman MD    Department of Dermatology  [facility]: Phone: [phone], Fax:[phone]  [facility]: Phone: [phone], Fax: [phone]

## 2021-08-13 ENCOUNTER — LAB (OUTPATIENT)
Dept: LAB | Facility: CLINIC | Age: 26
End: 2021-08-13
Payer: COMMERCIAL

## 2021-08-13 DIAGNOSIS — F64.9 GENDER DYSPHORIA: ICD-10-CM

## 2021-08-13 DIAGNOSIS — R73.9 ELEVATED RANDOM BLOOD GLUCOSE LEVEL: ICD-10-CM

## 2021-08-13 DIAGNOSIS — G40.822 NONINTRACTABLE EPILEPTIC SPASMS WITHOUT STATUS EPILEPTICUS (H): Chronic | ICD-10-CM

## 2021-08-13 LAB — HBA1C MFR BLD: 5.4 % (ref 0–5.6)

## 2021-08-13 PROCEDURE — 36415 COLL VENOUS BLD VENIPUNCTURE: CPT

## 2021-08-13 PROCEDURE — 83036 HEMOGLOBIN GLYCOSYLATED A1C: CPT

## 2021-08-13 PROCEDURE — 99000 SPECIMEN HANDLING OFFICE-LAB: CPT

## 2021-08-13 PROCEDURE — 80235 DRUG ASSAY LACOSAMIDE: CPT | Mod: 90

## 2021-08-16 LAB — LACOSAMIDE SERPL-MCNC: 5.9 UG/ML

## 2021-09-16 ENCOUNTER — OFFICE VISIT (OUTPATIENT)
Dept: DERMATOLOGY | Facility: CLINIC | Age: 26
End: 2021-09-16
Payer: COMMERCIAL

## 2021-09-16 DIAGNOSIS — F64.9 GENDER DYSPHORIA: ICD-10-CM

## 2021-09-16 PROCEDURE — 99207 PR NON-BILLABLE SERV PER CHARTING: CPT | Performed by: DERMATOLOGY

## 2021-09-16 PROCEDURE — 17999 UNLISTD PX SKN MUC MEMB SUBQ: CPT | Mod: 58 | Performed by: DERMATOLOGY

## 2021-09-16 NOTE — PATIENT INSTRUCTIONS
Gentle Max Laser Hair Reduction Instructions    Laser hair reduction: I will have redness, pain and swelling. I may have skin discoloration, bruising and itching. Risks are permenant discoloration, hives, infection scarring, eye injury,hair growth, and blister. The outcome could be no improvement or slight improvement. Multiple treatments are required. All hair will not be removed.     Before the procedure:  Sun Protection:  Do not allow the area to become tan or come in with a tan for a treatment. Tans will increased the risks of the procedure. Do not use spray or any over counter product self tanners prior to the procedure.     Shaving:   Gently shave the area the evening before the procedure.     Other:  Avoid any retinols such as Differin, adapalene, retinol or tretinoin to the treated area 1 week prior. Hold bleaching creams such as hydroquinone until 1 week prior.  Avoid any hair removal procedures such as waxing, electrolysis or other lasers on the skin within 6 weeks prior. Do not have any other cosmetic procedures done on the area within the prior 6 weeks such as chemical peels or dermabrasion.      Post Procedure:  Day 1-7  The healing time for any given treatment varies between clients. The following represents the general recovery phases you might expect. Individual clients may experience variations from this course.     Swelling/Discomfort/Redness:  The most common side effects are erythema and edema (redness and swelling) which generally occur immediately after and typically resolve within 48 hours. If crusting and blistering occurs call the clinic.     Activity:  Avoid swimming the day of laser hair removal treatment. Also, no rigorous exercise the day of treatment.     Moisturizers and Sunscreens:  Wait until the skin has returned to normal to start topical products.     Sun Protection:  Do not allow the area to become tan or come in with a tan for a treatment. Tans will increased the risks of the  procedure. Do not use spray or any over counter product self tanners prior to the procedure.     Warning signs:  Call the clinic if you have significant pain, increasing redness, pus, blisters/crusting or for any other concerns.     Who should I call with questions?    [facility]: [phone]    [facility]: [phone]    For urgent needs outside of business hours call the [facility] at [phone] and ask for the dermatology resident on call    [employer] messaging response may be delayed by several days

## 2021-09-16 NOTE — LETTER
9/16/2021         RE: Kun Boyle  [address] [facility] [address]        Dear Colleague,    Thank you for referring your patient, Kun Boyle, to the [facility]. Please see a copy of my visit note below.    See procedure note.      Again, thank you for allowing me to participate in the care of your patient.        Sincerely,        Aspen Chapman MD

## 2021-09-16 NOTE — NURSING NOTE
Kun Boyle's goals for this visit include:   Chief Complaint   Patient presents with     Procedure     LHR       She requests these members of her care team be copied on today's visit information: no    PCP: Josafat Goins    Referring Provider:  No referring provider defined for this encounter.    There were no vitals taken for this visit.    Do you need any medication refills at today's visit? No    Dermatology Laser Intake Checklist:  History of psoriasis: No  History of recent tan, indoor or outdoor tanning/vacation or other sun exposure: No  History of vitiligo: No  Family history of vitiligo: No  Recent other cosmetic procedure(microderm abrasion/peel/hair removal/facial etc): No  History of HSV: No  Did the patient start valtrex: No  For genital laser hair removal patient only: Is there a history of genital warts or condyloma: No  Tattoo in the area to be treated: No  Is patient using hydroquinone: No  Retinoids and other acne medications stopped for 2 weeks: No  Has the patient had accutane in the last 6-12 months: No  Pregnant or breastfeeding: No  History of skin cancer in area planned for treatment: No  History of treatment with gold: No  Changes in medical history: No  Photos obtained: No  Does the patient smoke: No  Is the patient on ibuprofen/aspirin/plavix/coumadin/other blood thinner: No  If patient is taking narcotic or diazepam(valium)-does patient have : No  There were no vitals taken for this visit.     Candi Renee LPN

## 2021-09-16 NOTE — PROCEDURES
Laser Hair Removal Gentle Max Prox (755nm)  Procedure Date: Sep 16, 2021    Staff Surgeon: Aspen Chapman MD    Resident Surgeon: n/a    Assistant: Gilma Sanderson RN    Trimming required prior to treatment in clinic?: yes  Cheng skin type: II  Diagnosis/Treatment Reason: gender dysphoria    Treatment#: 4     Laser Settings:  Wavelength(755/1064nm): 755  Location: mons pubis, bilateral inguinal creases, perineum  Fluence: 18J   Spot size: 18mm  Pulse width:  3 mS  Total Number of Pulses: 89  Dynamic cooling spray settin mS  Dynamic cooling device delay:  30 mS    Laser Settings:  Wavelength(755/1064nm): 755  Location: penile shaft, scrotum  Fluence: 16J   Spot size: 18mm  Pulse width:  3 mS  Total Number of Pulses: 16  Dynamic cooling spray settin mS  Dynamic cooling device delay:  30 mS    Anesthesia:  Topical LMX    Celia used?:No  Ice used?: Yes    Description of Operation/Procedure:   The risks were again reviewed including skin hyperpigmentation, hypopigmentation, scarring, bruising, and blistering. Reviewed white and red hair do not respond. Reviewed hair may regrow as it is not permanent but a reduction in hair growth.  Reviewed that a 3 months waiting period prior to surgery to recommended. Reviewed that the patient can terminate the procedure at anytime.    Consent is up to date.  The patient was positioned to optimally expose the area treated. Dynamic cooling was verified and functioning properly.  Protective eyewear was worn by the patient and goggles on all personnel in the treatment room.  The patient confirmed the site to be treated. The laser energy output was verified by meter reading.  N95 and buffalo filtration was used.     The clinically evident lesion(s) was/were treated with laser beam as above with 1 pass.  The patient tolerated the procedure well and no complications were noted. Post operative instructions were provided. The total laser operation and preparation time was 5  minutes.  The patient was counseled to contact us immediately for any concerns. The patient was offered and recommended to read their after visit summary.     Numeric pain scale after treatment: 1/10    The patient will follow-up in 6 weeks.    Billed to insurance.    Dr. Chapman staffed the patient was present for identifying and marking target area(s), ascertainment of protective equipment utilization, calibration of laser, and treatment of area(s).    Gilma Sanderson RN performed entire procedure.    Staff Involved:  Staff Only    Aspen Chapman MD    Department of Dermatology  [facility]: Phone: [phone], Fax:[phone]  [facility]: Phone: [phone], Fax: [phone]

## 2021-09-16 NOTE — NURSING NOTE
1.5 grams LMX applied to mons pubis, bilateral inguinal creases, scrotum, penile shaft and perineum.    Lot# [id number]  Exp: 04/2024    Candi Renee LPN

## 2021-10-10 ENCOUNTER — HEALTH MAINTENANCE LETTER (OUTPATIENT)
Age: 26
End: 2021-10-10

## 2021-10-22 ENCOUNTER — MYC MEDICAL ADVICE (OUTPATIENT)
Dept: DERMATOLOGY | Facility: CLINIC | Age: 26
End: 2021-10-22

## 2021-11-23 ENCOUNTER — TELEPHONE (OUTPATIENT)
Dept: FAMILY MEDICINE | Facility: CLINIC | Age: 26
End: 2021-11-23
Payer: COMMERCIAL

## 2021-11-23 NOTE — TELEPHONE ENCOUNTER
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

## 2021-11-23 NOTE — TELEPHONE ENCOUNTER
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

## 2021-11-24 NOTE — TELEPHONE ENCOUNTER
Can we please have a prescription entered in Epic for the requested medication? We need this in order to initiate a prior authorization. Please route back to PA Team when finished.    Thank you,    Central Prior Authorization Team  Phone: [phone]

## 2022-02-14 ENCOUNTER — TELEPHONE (OUTPATIENT)
Dept: UROLOGY | Facility: CLINIC | Age: 27
End: 2022-02-14
Payer: COMMERCIAL

## 2022-02-14 NOTE — TELEPHONE ENCOUNTER
[facility] Call Center    Phone Message    May a detailed message be left on voicemail: yes     Reason for Call: Other: Pt called and stated he wanted to do a consult for a Vulvoplasty. This diagnosis is not on protocols so unsure if Dr. Morales will see for this. Please call Pt to further discuss. Thanks!     Action Taken: Message routed to:  [facility] ([facility] Uro    Travel Screening: Not Applicable

## 2022-02-21 NOTE — TELEPHONE ENCOUNTER
Writer reached out via [email or web address] since pt is active. If pt doesn't answer, writer to call pt.     Laura Burnette

## 2022-05-11 ENCOUNTER — TRANSFERRED RECORDS (OUTPATIENT)
Dept: HEALTH INFORMATION MANAGEMENT | Facility: CLINIC | Age: 27
End: 2022-05-11

## 2022-05-21 ENCOUNTER — HEALTH MAINTENANCE LETTER (OUTPATIENT)
Age: 27
End: 2022-05-21

## 2022-08-24 ENCOUNTER — PRE VISIT (OUTPATIENT)
Dept: UROLOGY | Facility: CLINIC | Age: 27
End: 2022-08-24

## 2022-08-24 NOTE — TELEPHONE ENCOUNTER
MEDICAL RECORDS REQUEST   [facility]  Urology Clinic  [address] [place] [address]  PHONE: [phone]  Fax: [phone]        FUTURE VISIT INFORMATION                                                   LUIS MIGUEL Mujica: 1995 scheduled for future visit at [facility]    APPOINTMENT INFORMATION:    Date: 2022    Provider:  Toribio Morales MD    Reason for Visit/Diagnosis: transgender consult for surgery    RECORDS REQUESTED FOR VISIT                                                     NOTES  STATUS/DETAILS   OFFICE NOTE from other specialist  yes, 2021, 2021 -- Aspen Chapman MD @ [facility]  2021, 2020 -- Kimberlee Keyes MD @ [facility]  2021, 2020, 2019, 2019 -- Josie Macias MD @ [facility]  2019, 10/09/2019, 2019 -- BG @ [facility]  2019 -- Helen Rodriguez MD @ [facility]    More Records in EPIC   DISCHARGE SUMMARY from hospital  yes, 2020 -- Wu Nielson MD @ [facility]   OP VISIT  yes, 2019 -- BG @ [facility]   MEDICATION LIST  yes   LABS     URINALYSIS (UA)  yes     PRE-VISIT CHECKLIST      Record collection complete Yes   Appointment appropriately scheduled           (right time/right provider) Yes   Joint diagnostic appointment coordinated correctly          (ensure right order & amount of time) Yes   MyChart activation Yes   Questionnaire complete If no, please explain penbding

## 2022-09-08 ENCOUNTER — PRE VISIT (OUTPATIENT)
Dept: UROLOGY | Facility: CLINIC | Age: 27
End: 2022-09-08

## 2022-09-08 NOTE — TELEPHONE ENCOUNTER
Reason for visit: Consult     Relevant information: vulvoplasty    Records/imaging/labs/orders: in EPIC    Pt called: no    At Rooming: normal

## 2022-09-18 ENCOUNTER — HEALTH MAINTENANCE LETTER (OUTPATIENT)
Age: 27
End: 2022-09-18

## 2022-09-28 ENCOUNTER — OFFICE VISIT (OUTPATIENT)
Dept: UROLOGY | Facility: CLINIC | Age: 27
End: 2022-09-28
Payer: COMMERCIAL

## 2022-09-28 VITALS
HEIGHT: 68 IN | DIASTOLIC BLOOD PRESSURE: 80 MMHG | HEART RATE: 75 BPM | BODY MASS INDEX: 31.83 KG/M2 | WEIGHT: 210 LBS | SYSTOLIC BLOOD PRESSURE: 114 MMHG

## 2022-09-28 DIAGNOSIS — F64.9 GENDER DYSPHORIA: Primary | ICD-10-CM

## 2022-09-28 PROCEDURE — 99215 OFFICE O/P EST HI 40 MIN: CPT | Performed by: UROLOGY

## 2022-09-28 ASSESSMENT — PAIN SCALES - GENERAL: PAINLEVEL: NO PAIN (0)

## 2022-09-28 NOTE — NURSING NOTE
"Chief Complaint   Patient presents with     Consult     Surgery consult       Blood pressure 114/80, pulse 75, height 1.727 m (5' 8\"), weight 95.3 kg (210 lb), not currently breastfeeding. Body mass index is 31.93 kg/m .    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     Anxiety     Depression     Dermatitis     Class 1 obesity due to excess calories without serious comorbidity with body mass index (BMI) of 32.0 to 32.9 in adult       Allergies   Allergen Reactions     Codeine Hives     Difficulty breathing, throat swelling..Happen approximately 14 years ago      Nickel Rash       Current Outpatient Medications   Medication Sig Dispense Refill     estradiol (ESTRACE) 2 MG tablet Take 1 tablet (2 mg) by mouth 2 times daily 180 tablet 1     FLUoxetine (PROZAC) 20 MG capsule Take 1 capsule (20 mg) by mouth daily 90 capsule 0     lacosamide (VIMPAT) 200 MG TABS tablet Take 200 mg by mouth 2 times daily       triamcinolone (KENALOG) 0.1 % external ointment Apply twice daily to rash on abdomen 80 g 0       Social History     Tobacco Use     Smoking status: Never Smoker     Smokeless tobacco: Never Used   Substance Use Topics     Alcohol use: Yes     Comment: occasional, socially     Drug use: No       Abi Sr  9/28/2022  8:55 AM  "

## 2022-09-28 NOTE — PROGRESS NOTES
"HPI:  Kun Boyle is a 27 year old trans female with gender dysphoria.  We did orchiectomy 11/2019.  She has been working towards full vaginoplasty  She has had 6 sessions of laser hair removal.  She went to [facility] - perhaps 6 sessions?  She is seeking vaginoplasty.  There has been some discussion of full depth vs. Minimal depth.    Exam:  /80   Pulse 75   Ht 1.727 m (5' 8\")   Wt 95.3 kg (210 lb)   BMI 31.93 kg/m    NAD  Abdomen soft  Scrotum with residual hair  Testicles absent.  Circumcised phallus    Assessment & Plan   Gender dysphoria    She has a persistent, well documented gender dysphoria. She has capacity to make a fully informed decision and to consent for treatment. Her mental health issues are well controlled. She has been on continuous hormones for years.     The patient meets all of these criteria. We discussed that gender affirmation surgery should be considered permanent. We discussed risks/complications of rectal injury, rectovaginal fistula, bleeding, fluid collection, infection, injury to surrounding structures, flap loss, sensory loss, wound dehiscence, vaginal prolapse, vaginal shrinkage/stenosis, need for lifelong dilation, urinary stream abnormalities, DVT/PE and need for revision surgery.     We discussed the option for minimal depth. She is unsure. Perhaps full. Perhaps minimal.     We also discussed the need to stop hormones yareli-procedurally for 2 weeks before and after surgery.     We discussed that transgender vaginoplasty for this patient would include: penectomy, clitoroplasty, labiaplasty, urethral reconstruction, creation of a vagina, skin graft, colpopexy to suspend the vagina, and scrotectomy.     Continue hair removal. I suspect she can return to [facility] to resume hair removal    RTC once hair removal nearing completion.  Will continue to consider full vs. Minimal depth.  She came to clinic today with her transgender female partner, who also had " an appointment today.  They are considering trying to have surgery around the same time.  Also considering their surgical options.  They'll continue to consider their options, work on letters of support and decide their treatment plans.  Regardless, she should return to clinic with letters complete once.    Toribio Morales MD  Reconstructive Urology  [facility]      45 minutes spent on the date of the encounter doing chart review, history and exam, documentation and further activities per the note

## 2022-09-28 NOTE — LETTER
"9/28/2022       RE: Kun Boyle  [address]  \[facility] [place] [facility]\"" [id number]     Dear Colleague,    Thank you for referring your patient, Kun Boyle, to the [facility] UROLOGY CLINIC [place] at [facility]. Please see a copy of my visit note below.    HPI:  Kun Boyle is a 27 year old trans female with gender dysphoria.  We did orchiectomy 11/2019.  She has been working towards full vaginoplasty  She has had 6 sessions of laser hair removal.  She went to [facility] clinic - perhaps 6 sessions?  She is seeking vaginoplasty.  There has been some discussion of full depth vs. Minimal depth.    Exam:  /80   Pulse 75   Ht 1.727 m (5' 8\")   Wt 95.3 kg (210 lb)   BMI 31.93 kg/m    NAD  Abdomen soft  Scrotum with residual hair  Testicles absent.  Circumcised phallus    Assessment & Plan   Gender dysphoria    She has a persistent, well documented gender dysphoria. She has capacity to make a fully informed decision and to consent for treatment. Her mental health issues are well controlled. She has been on continuous hormones for years.     The patient meets all of these criteria. We discussed that gender affirmation surgery should be considered permanent. We discussed risks/complications of rectal injury, rectovaginal fistula, bleeding, fluid collection, infection, injury to surrounding structures, flap loss, sensory loss, wound dehiscence, vaginal prolapse, vaginal shrinkage/stenosis, need for lifelong dilation, urinary stream abnormalities, DVT/PE and need for revision surgery.     We discussed the option for minimal depth. She is unsure. Perhaps full. Perhaps minimal.     We also discussed the need to stop hormones yareli-procedurally for 2 weeks before and after surgery.     We discussed that transgender vaginoplasty for this patient would include: penectomy, clitoroplasty, labiaplasty, urethral reconstruction, creation of a vagina, skin graft, " colpopexy to suspend the vagina, and scrotectomy.     Continue hair removal. I suspect she can return to [place] to resume hair removal    RTC once hair removal nearing completion.  Will continue to consider full vs. Minimal depth.  She came to clinic today with her transgender female partner, who also had an appointment today.  They are considering trying to have surgery around the same time.  Also considering their surgical options.  They'll continue to consider their options, work on letters of support and decide their treatment plans.  Regardless, she should return to clinic with letters complete once.    Toribio Morales MD  Reconstructive Urology  [facility]      45 minutes spent on the date of the encounter doing chart review, history and exam, documentation and further activities per the note

## 2023-02-21 ENCOUNTER — TRANSFERRED RECORDS (OUTPATIENT)
Dept: HEALTH INFORMATION MANAGEMENT | Facility: CLINIC | Age: 28
End: 2023-02-21
Payer: COMMERCIAL

## 2023-05-09 ENCOUNTER — DOCUMENTATION ONLY (OUTPATIENT)
Dept: PLASTIC SURGERY | Facility: CLINIC | Age: 28
End: 2023-05-09
Payer: COMMERCIAL

## 2023-05-09 NOTE — PROGRESS NOTES
[facility] :  Care Coordination Note     SITUATION   Kun Boyle is a 28 year old adult who is receiving support for:  Care Team  .    BACKGROUND     Reviewed LOS. Pt will need 2nd LOS.     ASSESSMENT     Surgery              CGC Assessment  Comprehensive Gender Care (CGC) Enrollment: Enrolled  Patient has a therapist: Yes  Name of therapist: Ekaterina Sheridan  Letter of support #1: Received  Letter #1 Date: 12/26/22  Letter of support #2: Requested  Surgery being considered: Yes  Vaginoplasty: Yes          PLAN          Nursing Interventions:       Follow-up plan:  Pt to upload 2nd LOS.        CATARINA PAGAN [facility]

## 2023-05-23 ENCOUNTER — OFFICE VISIT (OUTPATIENT)
Dept: PLASTIC SURGERY | Facility: CLINIC | Age: 28
End: 2023-05-23
Payer: COMMERCIAL

## 2023-05-23 VITALS
DIASTOLIC BLOOD PRESSURE: 73 MMHG | OXYGEN SATURATION: 96 % | HEART RATE: 71 BPM | WEIGHT: 218 LBS | HEIGHT: 68 IN | BODY MASS INDEX: 33.04 KG/M2 | SYSTOLIC BLOOD PRESSURE: 110 MMHG

## 2023-05-23 DIAGNOSIS — F64.9 GENDER DYSPHORIA: Primary | ICD-10-CM

## 2023-05-23 PROCEDURE — 99215 OFFICE O/P EST HI 40 MIN: CPT | Performed by: UROLOGY

## 2023-05-23 ASSESSMENT — PAIN SCALES - GENERAL: PAINLEVEL: NO PAIN (0)

## 2023-05-23 NOTE — NURSING NOTE
"Chief Complaint   Patient presents with     LEVI Tristan, is being seen today for a follow up discussion regarding vaginoplasty.       Vitals:    05/23/23 1328   BP: 110/73   BP Location: Left arm   Patient Position: Chair   Cuff Size: Adult Large   Pulse: 71   SpO2: 96%   Weight: 98.9 kg (218 lb)   Height: 1.727 m (5' 8\")       Body mass index is 33.15 kg/m .      Vanessa Hart LPN    "

## 2023-05-23 NOTE — LETTER
5/23/2023       RE: Kun Boyle  [address]  [facility] [address]       Dear Colleague,    Thank you for referring your patient, Kun Boyle, to the [facility] PLASTIC AND RECONSTRUCTIVE SURGERY CLINIC [place] at [facility]. Please see a copy of my visit note below.        Name: Kun Boyle     MRN: [id number]   YOB: 1995                 Chief Complaint:   Gender Dysphoria            History of Present Illness:   Kun is a 28 year old transgender female seen in consultation for gender dysphoria  She had initial consult in 2019 and completed orchiectomy in 11/2019. Was initially working toward full depth vaginoplasty as of Sept 2022, but still had hair removal to complete. She is here today wanting to proceed with minimal depth vaginoplasty.    Patient transitioned 5 or 6 years ago  Preferred pronouns are: she/her/hers  The patient has been on exogenous hormones since: Started hormone 5 or 6 years ago. Estradiol only now post-orchi.  In terms of an intimate relationship, the patient is here with her partner, Pauly, who is also seeking minimal depth vaginoplasty.  In terms of fertility, the patient: has had an orchi    (New)  The patient has obtained letters of support from two providers. One is adequate and second letter yet to be reviewed.    (Prior Surgery)  The patient has previously undergone orchiectomy. This was performed 11/22/2019 by Dr Morales.  Postoperatively, the patient experienced no complications.     Long-term surgical goals for the patient include: minimal depth vaginoplasty    The patient is here today expressing interest in minimal depth vaginoplasty.    The patient has done some hair removal, but stopped once she decided on minimal depth         Past Medical History:     Past Medical History:   Diagnosis Date    Anxiety     Asthma     Depression     Sleep apnea             Past Surgical History:     Past  "Surgical History:   Procedure Laterality Date    C EACH ADD TOOTH EXTRACTION      ORCHIECTOMY SCROTAL Bilateral 11/22/2019    Procedure: BILATERAL SIMPLE SCROTAL ORCHIECTOMY;  Surgeon: Toribio Morales MD;  Location: [facility] OR    TONSILLECTOMY      10 years old            Social History:     Social History     Tobacco Use    Smoking status: Never    Smokeless tobacco: Never   Vaping Use    Vaping status: Not on file   Substance Use Topics    Alcohol use: Yes     Comment: occasional, socially            Family History:     Family History   Problem Relation Age of Onset    Breast Cancer Mother     Bleeding Disorder Father               Allergies:     Allergies   Allergen Reactions    Codeine Hives     Difficulty breathing, throat swelling..Happen approximately 14 years ago     Nickel Rash            Medications:     Current Outpatient Medications   Medication Sig    estradiol (ESTRACE) 2 MG tablet Take 1 tablet (2 mg) by mouth 2 times daily    FLUoxetine (PROZAC) 20 MG capsule Take 1 capsule (20 mg) by mouth daily    lacosamide (VIMPAT) 200 MG TABS tablet Take 200 mg by mouth 2 times daily    triamcinolone (KENALOG) 0.1 % external ointment Apply twice daily to rash on abdomen     No current facility-administered medications for this visit.             Review of Systems:    ROS: ROS neg other than the symptoms noted above in the HPI.          Physical Exam:   /73 (BP Location: Left arm, Patient Position: Chair, Cuff Size: Adult Large)   Pulse 71   Ht 1.727 m (5' 8\")   Wt 98.9 kg (218 lb)   SpO2 96%   BMI 33.15 kg/m    General: age-appropriate in NAD  HEENT: Head AT/NC, EOMI, CN Grossly intact  Resp: no respiratory distress  : circumcised penis, no testicles  Neuro: grossly intact  Motor: excellent strength throughout  Skin: clear of rashes or ecchymoses.           Assessment and Plan:   28 year old transgender female with gender dysphoria    The criteria for genital surgery are specific to the type of surgery " being requested.  Criteria for bottom surgery:    1. Persistent, well documented gender dysphoria;  2. Capacity to make a fully informed decision and to consent for treatment;  3. Age of consent (>18 years old)  4. If significant medical or mental health concerns are present, they must be well controlled.  5. 12 continuous months of hormone therapy as appropriate to the patient s gender goals (unless  the patient has a medical contraindication or is otherwise unable or unwilling to take  Hormones).  6. Two letters of support      She has a persistent, well documented gender dysphoria. She has capacity to make a fully informed decision and to consent for treatment. Her mental health issues are well controlled. She has been on continuous hormones for years. She has two letters of support.     The patient meets all of these criteria. We discussed that gender affirmation surgery should be considered permanent. We discussed risks/complications of rectal injury, rectovaginal fistula, bleeding, fluid collection, infection, injury to surrounding structures, flap loss, sensory loss, wound dehiscence, vaginal prolapse, vaginal shrinkage/stenosis, need for lifelong dilation, urinary stream abnormalities, DVT/PE and need for revision surgery.     We discussed the option for minimal depth and full depth. She would like minimal depth vaginoplasty     We also discussed the need to stop hormones yareli-procedurally for 2 weeks before and after surgery.     We discussed that transgender vaginoplasty for this patient would include: penectomy,  clitoroplasty, labiaplasty, urethral reconstruction, creation of a minimal depth canal, and scrotectomy.     Ready for prior auth once second letter reviewed    Plan: Submit PA for minimal depth vaginoplasty (without orchiectomy) once second LOS reviewed.  Patient's partner also wants surgery around same time period.       Physician Attestation   I, Toribio Morales MD, saw this patient and agree  with the findings and plan of care as documented in the note.        45 minutes spent by me on the date of the encounter doing chart review, history and exam, documentation and further activities per the note          Again, thank you for allowing me to participate in the care of your patient.      Sincerely,    Toribio Morales MD

## 2023-05-23 NOTE — PROGRESS NOTES
Name: Kun Boyle     MRN: [id number]   YOB: 1995                 Chief Complaint:   Gender Dysphoria            History of Present Illness:   Kun is a 28 year old transgender female seen in consultation for gender dysphoria  She had initial consult in 2019 and completed orchiectomy in 11/2019. Was initially working toward full depth vaginoplasty as of Sept 2022, but still had hair removal to complete. She is here today wanting to proceed with minimal depth vaginoplasty.    Patient transitioned 5 or 6 years ago  Preferred pronouns are: she/her/hers  The patient has been on exogenous hormones since: Started hormone 5 or 6 years ago. Estradiol only now post-orchi.  In terms of an intimate relationship, the patient is here with her partner, Pauly, who is also seeking minimal depth vaginoplasty.  In terms of fertility, the patient: has had an orchi    (New)  The patient has obtained letters of support from two providers. One is adequate and second letter yet to be reviewed.    (Prior Surgery)  The patient has previously undergone orchiectomy. This was performed 11/22/2019 by Dr Morales.  Postoperatively, the patient experienced no complications.     Long-term surgical goals for the patient include: minimal depth vaginoplasty    The patient is here today expressing interest in minimal depth vaginoplasty.    The patient has done some hair removal, but stopped once she decided on minimal depth         Past Medical History:     Past Medical History:   Diagnosis Date    Anxiety     Asthma     Depression     Sleep apnea             Past Surgical History:     Past Surgical History:   Procedure Laterality Date    C EACH ADD TOOTH EXTRACTION      ORCHIECTOMY SCROTAL Bilateral 11/22/2019    Procedure: BILATERAL SIMPLE SCROTAL ORCHIECTOMY;  Surgeon: Toirbio Morales MD;  Location: [facility] OR    TONSILLECTOMY      10 years old            Social History:     Social History     Tobacco Use    Smoking status:  "Never    Smokeless tobacco: Never   Vaping Use    Vaping status: Not on file   Substance Use Topics    Alcohol use: Yes     Comment: occasional, socially            Family History:     Family History   Problem Relation Age of Onset    Breast Cancer Mother     Bleeding Disorder Father               Allergies:     Allergies   Allergen Reactions    Codeine Hives     Difficulty breathing, throat swelling..Happen approximately 14 years ago     Nickel Rash            Medications:     Current Outpatient Medications   Medication Sig    estradiol (ESTRACE) 2 MG tablet Take 1 tablet (2 mg) by mouth 2 times daily    FLUoxetine (PROZAC) 20 MG capsule Take 1 capsule (20 mg) by mouth daily    lacosamide (VIMPAT) 200 MG TABS tablet Take 200 mg by mouth 2 times daily    triamcinolone (KENALOG) 0.1 % external ointment Apply twice daily to rash on abdomen     No current facility-administered medications for this visit.             Review of Systems:    ROS: ROS neg other than the symptoms noted above in the HPI.          Physical Exam:   /73 (BP Location: Left arm, Patient Position: Chair, Cuff Size: Adult Large)   Pulse 71   Ht 1.727 m (5' 8\")   Wt 98.9 kg (218 lb)   SpO2 96%   BMI 33.15 kg/m    General: age-appropriate in NAD  HEENT: Head AT/NC, EOMI, CN Grossly intact  Resp: no respiratory distress  : circumcised penis, no testicles  Neuro: grossly intact  Motor: excellent strength throughout  Skin: clear of rashes or ecchymoses.           Assessment and Plan:   28 year old transgender female with gender dysphoria    The criteria for genital surgery are specific to the type of surgery being requested.  Criteria for bottom surgery:    1. Persistent, well documented gender dysphoria;  2. Capacity to make a fully informed decision and to consent for treatment;  3. Age of consent (>18 years old)  4. If significant medical or mental health concerns are present, they must be well controlled.  5. 12 continuous months of " hormone therapy as appropriate to the patient s gender goals (unless  the patient has a medical contraindication or is otherwise unable or unwilling to take  Hormones).  6. Two letters of support      She has a persistent, well documented gender dysphoria. She has capacity to make a fully informed decision and to consent for treatment. Her mental health issues are well controlled. She has been on continuous hormones for years. She has two letters of support.     The patient meets all of these criteria. We discussed that gender affirmation surgery should be considered permanent. We discussed risks/complications of rectal injury, rectovaginal fistula, bleeding, fluid collection, infection, injury to surrounding structures, flap loss, sensory loss, wound dehiscence, vaginal prolapse, vaginal shrinkage/stenosis, need for lifelong dilation, urinary stream abnormalities, DVT/PE and need for revision surgery.     We discussed the option for minimal depth and full depth. She would like minimal depth vaginoplasty     We also discussed the need to stop hormones yareli-procedurally for 2 weeks before and after surgery.     We discussed that transgender vaginoplasty for this patient would include: penectomy,  clitoroplasty, labiaplasty, urethral reconstruction, creation of a minimal depth canal, and scrotectomy.     Ready for prior auth once second letter reviewed    Plan: Submit PA for minimal depth vaginoplasty (without orchiectomy) once second LOS reviewed.  Patient's partner also wants surgery around same time period.    JOSE ALBERTO Snider, CNP       Physician Attestation   I, Toribio Morales MD, saw this patient and agree with the findings and plan of care as documented in the note.      Today we essentially obtained informed consent from patient for minimal depth vaginoplasty. Will also get informed consent on day of surgery.    Toribio Morales MD  Reconstructive Urology    45 minutes spent by me on the date of the  encounter doing chart review, history and exam, documentation and further activities per the note

## 2023-06-01 ENCOUNTER — DOCUMENTATION ONLY (OUTPATIENT)
Dept: PLASTIC SURGERY | Facility: CLINIC | Age: 28
End: 2023-06-01
Payer: COMMERCIAL

## 2023-06-01 NOTE — PROGRESS NOTES
[facility] :  Care Coordination Note     SITUATION   Kun Boyle is a 28 year old adult who is receiving support for:  No chief complaint on file.  .    BACKGROUND     Laura reviewed pt's 2nd LOS from Markie Gutierrez, which is sufficient. Pt is ready for PA for minimal depth vaginoplasty.     ASSESSMENT     Surgery              CGC Assessment  Comprehensive Gender Care (CGC) Enrollment: Enrolled  Patient has a therapist: Yes  Name of therapist: Ekaterina Sheridan  Therapist's phone number: [phone]  Letter of support #1: Received  Letter #1 Date: 12/26/22  Letter of support #2: Received  Letter #2 Date: 05/10/23  Surgery being considered: Yes  Vaginoplasty: Yes          PLAN          Nursing Interventions:       Follow-up plan:    1. Submit PA for minimal depth vaginoplasty       Laura Burnette

## 2023-06-04 ENCOUNTER — HEALTH MAINTENANCE LETTER (OUTPATIENT)
Age: 28
End: 2023-06-04

## 2023-08-23 ENCOUNTER — DOCUMENTATION ONLY (OUTPATIENT)
Dept: PLASTIC SURGERY | Facility: CLINIC | Age: 28
End: 2023-08-23
Payer: COMMERCIAL

## 2023-08-24 DIAGNOSIS — F64.9 GENDER DYSPHORIA: Primary | ICD-10-CM

## 2023-08-24 RX ORDER — HEPARIN SODIUM 5000 [USP'U]/.5ML
5000 INJECTION, SOLUTION INTRAVENOUS; SUBCUTANEOUS
Status: CANCELLED | OUTPATIENT
Start: 2023-08-24

## 2023-08-24 RX ORDER — CEFAZOLIN SODIUM 2 G/50ML
2 SOLUTION INTRAVENOUS SEE ADMIN INSTRUCTIONS
Status: CANCELLED | OUTPATIENT
Start: 2023-08-24

## 2023-08-24 RX ORDER — CEFAZOLIN SODIUM 2 G/50ML
2 SOLUTION INTRAVENOUS
Status: CANCELLED | OUTPATIENT
Start: 2023-08-24

## 2023-08-29 ENCOUNTER — TELEPHONE (OUTPATIENT)
Dept: UROLOGY | Facility: CLINIC | Age: 28
End: 2023-08-29
Payer: COMMERCIAL

## 2023-08-29 NOTE — TELEPHONE ENCOUNTER
Left message regarding scheduling surgery/procedure with Dr. Morales.   Writer left call back number on the patients voicemail       Julia Stafford on 8/29/2023 at 11:28 AM   P: [phone]

## 2023-08-29 NOTE — TELEPHONE ENCOUNTER
Patient called to schedule surgery with Dr. Morales. Patient reports that she would like to schedule surgery with Dr. Morales at the same time as her wife. Patient reports that her wife is coming to clinic to see Dr. Moraels in Sept and she has her letters of support. Informed patient writer is not sure where she is at in the process, but we can hold off on scheduling at this time until patients wife is ready to schedule also. Kun would like to coordinate surgery/have surgery the same day. Informed Kun we will see them in the clinic with Dr. Morales and discuss at/after the clinic visit. She was understanding and has no further questions at this time. Surgery is not scheduled.    Julia Stafford on 8/29/2023 at 11:44 AM

## 2023-10-22 ENCOUNTER — MYC REFILL (OUTPATIENT)
Dept: FAMILY MEDICINE | Facility: CLINIC | Age: 28
End: 2023-10-22
Payer: COMMERCIAL

## 2023-10-26 NOTE — TELEPHONE ENCOUNTER
Received call that patient and her partner Pauly would like to go forward with scheduling surgery with Dr. Morales. Informed patient I would like to speak further with scheduling team to confirm if there is any specific dates in mind and would follow back up with patient once I am able to confirm. Patient understood and thanked writer.     Lilian Sawyer on 10/26/2023 at 3:22 PM

## 2023-10-27 NOTE — TELEPHONE ENCOUNTER
Called patient to schedule surgery with Dr. Morales. No answer, callback number [phone] left on  for patient.     Lilian Sawyer on 10/27/2023 at 9:09 AM

## 2023-10-27 NOTE — TELEPHONE ENCOUNTER
Scheduled surgery with Dr. Morales on 12/4    Spoke with: Patient    Surgery is located at [facility]    Patient will be seen for their H&P by:    PCP Dr. Garza within 30 days of surgery - Confirmed PCP on file is up to date    Anesthesia type: General      Requested Imaging required for surgery: NA    Patient is scheduled for their 2 week post op 12/19 at 340pm    Patient will receive their packet via [employer] per their preference    Additional comments: OLAYINKA Pedroza on 10/27/2023 at 1:05 PM

## 2023-10-31 NOTE — TELEPHONE ENCOUNTER
"Last seen 3/21/21, sent pt MyChart message to schedule an appt. Med is pt reported    Request for medication refill:    lacosamide (VIMPAT) 200 MG TABS tablet     Providers if patient needs an appointment and you are willing to give a one month supply please refill for one month and  send a letter/MyChart using \".SMILLIMITEDREFILL\" .smillimited and route chart to \"P SMI \" (Giving one month refill in non controlled medications is strongly recommended before denial)    If refill has been denied, meaning absolutely no refills without visit, please complete the smart phrase \".smirxrefuse\" and route it to the \"P SMI MED REFILLS\"  pool to inform the patient and the pharmacy.    Alesha Robles RN    "

## 2023-11-02 ENCOUNTER — MYC MEDICAL ADVICE (OUTPATIENT)
Dept: PLASTIC SURGERY | Facility: CLINIC | Age: 28
End: 2023-11-02
Payer: COMMERCIAL

## 2023-11-02 RX ORDER — LACOSAMIDE 200 MG/1
200 TABLET ORAL 2 TIMES DAILY
OUTPATIENT
Start: 2023-11-02

## 2023-11-02 NOTE — TELEPHONE ENCOUNTER
Medication Refill Denied  Reason: Patient needs: provider visit and lab tests, vimpat trough EKG  Provider: I have not called the patient about the Rx denial, please call.  PCS: Please notify the pharmacy, Please contact the patient to explain reasoning provided above and to schedule the patient for a provider visit with any provider..    This medication is not appropriate for temporizing refill. Intensive monitoring is needed. Patient has not been seen at [facility] since 2021. If patient is currently on this medication as an outpatient pt should reach  out to current prescriber for temporizing refill.  Cecilia Garza MD

## 2023-11-03 ENCOUNTER — DOCUMENTATION ONLY (OUTPATIENT)
Dept: UROLOGY | Facility: CLINIC | Age: 28
End: 2023-11-03
Payer: COMMERCIAL

## 2023-11-03 NOTE — PROGRESS NOTES
Type of Form Received: FMLA    Form Received (Date) 11/3/23   Form Filled out Yes, date 11/3/23   Placed in provider folder Yes       Received Completed forms Yes   Faxed Forms Faxed To: [employer]  Fax Number: [phone]   Sent to HIM (Date) 11/8/23

## 2023-11-08 ENCOUNTER — TELEPHONE (OUTPATIENT)
Dept: PLASTIC SURGERY | Facility: CLINIC | Age: 28
End: 2023-11-08
Payer: COMMERCIAL

## 2023-11-08 NOTE — TELEPHONE ENCOUNTER
Pre and Post Op Patient Education                                       Diagnosis: gender dysphoria  Teaching pre and post op for Pat Boyle  Person involved in teaching: patient and Nurse, Pat heard the teaching done with Pauly Morocho so teaching was somewhat abbreviated today.      Motivation level: High  Asks questions: Yes  Eager to learn:  Yes  Cooperative: Yes  Receptive (willing/able to accept information): Yes    Patient demonstrates an understanding of the following:  - Date of surgery:  December 4th  - Location of surgery: [facility]   - Pre operative History/Physical other testing prior to surgery: Pre-op clearance scheduled [facility] on 11/15 at 4 pm  No more than 30 days prior to surgery date.   Medications to take the day of surgery: Per PCP   Blood thinner medications discussed and when to stop (if applicable): Per PCP   Diabetes medication management (if applicable): Per PCP  Stop estradiol 11/20/23.  LA work paperwork completed  - Urine anaylsis/urine culture to be collected on: 11/15/23    - Post-op follow-up: 12/19/23      Patient verbalizes an understanding of the following:  - The need for a responsible adult  and someone to stay with them for the first 24 hours post-operatively: Yes  - NPO per anesthesia guidelines: Yes  - Pre-op showering x2 with Hibiclens/chlorhexadine soap: Yes  - The need to stop/discontinue all hormones 1 weeks before surgery and may restart upon return home after surgery: Yes    Discussed   - Pain management after surgery  - Infection prevention and hand hygiene  - Surgical procedure site care taught  - Signs and symptoms of infection  - Wound care and will be taught at the time of discharge  - Reviewed Vaginoplasty: Pre and Post Operative Instructions packet in full Yes  - Information about how to contact the hospital, nurse, and clinic if needed    Postoperative Plans:  Pat's mom will be helping with care. Works for [employer].  Taking off until Jan 15th     Surgical instructions given to patient via phone.    Total time with patient: 30 minutes    FRANKLIN Chang

## 2023-11-15 ENCOUNTER — OFFICE VISIT (OUTPATIENT)
Dept: FAMILY MEDICINE | Facility: CLINIC | Age: 28
End: 2023-11-15
Payer: COMMERCIAL

## 2023-11-15 VITALS
DIASTOLIC BLOOD PRESSURE: 79 MMHG | HEIGHT: 68 IN | BODY MASS INDEX: 32.61 KG/M2 | SYSTOLIC BLOOD PRESSURE: 112 MMHG | HEART RATE: 71 BPM | WEIGHT: 215.2 LBS | OXYGEN SATURATION: 99 % | RESPIRATION RATE: 18 BRPM

## 2023-11-15 DIAGNOSIS — Z01.818 PREOP GENERAL PHYSICAL EXAM: Primary | ICD-10-CM

## 2023-11-15 DIAGNOSIS — Z00.00 HEALTHCARE MAINTENANCE: ICD-10-CM

## 2023-11-15 PROBLEM — L30.9 DERMATITIS: Status: RESOLVED | Noted: 2021-03-13 | Resolved: 2023-11-15

## 2023-11-15 LAB
ANION GAP SERPL CALCULATED.3IONS-SCNC: 12 MMOL/L (ref 7–15)
BASOPHILS # BLD AUTO: 0 10E3/UL (ref 0–0.2)
BASOPHILS NFR BLD AUTO: 1 %
BUN SERPL-MCNC: 14.5 MG/DL (ref 6–20)
CALCIUM SERPL-MCNC: 9.3 MG/DL (ref 8.6–10)
CHLORIDE SERPL-SCNC: 103 MMOL/L (ref 98–107)
CREAT SERPL-MCNC: 0.85 MG/DL (ref 0.51–1.17)
DEPRECATED HCO3 PLAS-SCNC: 26 MMOL/L (ref 22–29)
EGFRCR SERPLBLD CKD-EPI 2021: >90 ML/MIN/1.73M2
EOSINOPHIL # BLD AUTO: 0.3 10E3/UL (ref 0–0.7)
EOSINOPHIL NFR BLD AUTO: 4 %
ERYTHROCYTE [DISTWIDTH] IN BLOOD BY AUTOMATED COUNT: 12 % (ref 10–15)
GLUCOSE SERPL-MCNC: 117 MG/DL (ref 70–99)
HCT VFR BLD AUTO: 40.6 % (ref 35–53)
HGB BLD-MCNC: 13.3 G/DL (ref 11.7–17.7)
IMM GRANULOCYTES # BLD: 0 10E3/UL
IMM GRANULOCYTES NFR BLD: 0 %
LYMPHOCYTES # BLD AUTO: 3.1 10E3/UL (ref 0.8–5.3)
LYMPHOCYTES NFR BLD AUTO: 43 %
MCH RBC QN AUTO: 27.9 PG (ref 26.5–33)
MCHC RBC AUTO-ENTMCNC: 32.8 G/DL (ref 31.5–36.5)
MCV RBC AUTO: 85 FL (ref 78–100)
MONOCYTES # BLD AUTO: 0.5 10E3/UL (ref 0–1.3)
MONOCYTES NFR BLD AUTO: 6 %
NEUTROPHILS # BLD AUTO: 3.4 10E3/UL (ref 1.6–8.3)
NEUTROPHILS NFR BLD AUTO: 47 %
PLATELET # BLD AUTO: 348 10E3/UL (ref 150–450)
POTASSIUM SERPL-SCNC: 3.6 MMOL/L (ref 3.4–5.3)
RBC # BLD AUTO: 4.77 10E6/UL (ref 3.8–5.9)
SODIUM SERPL-SCNC: 141 MMOL/L (ref 135–145)
WBC # BLD AUTO: 7.2 10E3/UL (ref 4–11)

## 2023-11-15 PROCEDURE — 80048 BASIC METABOLIC PNL TOTAL CA: CPT

## 2023-11-15 PROCEDURE — 36415 COLL VENOUS BLD VENIPUNCTURE: CPT

## 2023-11-15 PROCEDURE — 99213 OFFICE O/P EST LOW 20 MIN: CPT | Mod: GC

## 2023-11-15 PROCEDURE — 85025 COMPLETE CBC W/AUTO DIFF WBC: CPT

## 2023-11-15 NOTE — PATIENT INSTRUCTIONS
Preparing for Your Surgery  Getting started  A nurse will call you to review your health history and instructions. They will give you an arrival time based on your scheduled surgery time. Please be ready to share:  Your doctor's clinic name and phone number  Your medical, surgical, and anesthesia history  A list of allergies and sensitivities  A list of medicines, including herbal treatments and over-the-counter drugs  Whether the patient has a legal guardian (ask how to send us the papers in advance)  Please tell us if you're pregnant--or if there's any chance you might be pregnant. Some surgeries may injure a fetus (unborn baby), so they require a pregnancy test. Surgeries that are safe for a fetus don't always need a test, and you can choose whether to have one.   If you have a child who's having surgery, please ask for a copy of Preparing for Your Child's Surgery.    Preparing for surgery  Within 10 to 30 days of surgery: Have a pre-op exam (sometimes called an H&P, or History and Physical). This can be done at a clinic or pre-operative center.  If you're having a , you may not need this exam. Talk to your care team.  At your pre-op exam, talk to your care team about all medicines you take. If you need to stop any medicines before surgery, ask when to start taking them again.  We do this for your safety. Many medicines can make you bleed too much during surgery. Some change how well surgery (anesthesia) drugs work.  Call your insurance company to let them know you're having surgery. (If you don't have insurance, call [phone].)  Call your clinic if there's any change in your health. This includes signs of a cold or flu (sore throat, runny nose, cough, rash, fever). It also includes a scrape or scratch near the surgery site.  If you have questions on the day of surgery, call your hospital or surgery center.  Eating and drinking guidelines  For your safety: Unless your surgeon tells you otherwise,  follow the guidelines below.  Eat and drink as usual until 8 hours before you arrive for surgery. After that, no food or milk.  Drink clear liquids until 2 hours before you arrive. These are liquids you can see through, like water, Gatorade, and Propel Water. They also include plain black coffee and tea (no cream or milk), candy, and breath mints. You can spit out gum when you arrive.  If you drink alcohol: Stop drinking it the night before surgery.  If your care team tells you to take medicine on the morning of surgery, it's okay to take it with a sip of water.  Preventing infection  Shower or bathe the night before and morning of your surgery. Follow the instructions your clinic gave you. (If no instructions, use regular soap.)  Don't shave or clip hair near your surgery site. We'll remove the hair if needed.  Don't smoke or vape the morning of surgery. You may chew nicotine gum up to 2 hours before surgery. A nicotine patch is okay.  Note: Some surgeries require you to completely quit smoking and nicotine. Check with your surgeon.  Your care team will make every effort to keep you safe from infection. We will:  Clean our hands often with soap and water (or an alcohol-based hand rub).  Clean the skin at your surgery site with a special soap that kills germs.  Give you a special gown to keep you warm. (Cold raises the risk of infection.)  Wear special hair covers, masks, gowns and gloves during surgery.  Give antibiotic medicine, if prescribed. Not all surgeries need antibiotics.  What to bring on the day of surgery  Photo ID and insurance card  Copy of your health care directive, if you have one  Glasses and hearing aids (bring cases)  You can't wear contacts during surgery  Inhaler and eye drops, if you use them (tell us about these when you arrive)  CPAP machine or breathing device, if you use them  A few personal items, if spending the night  If you have . . .  A pacemaker, ICD (cardiac defibrillator) or other  implant: Bring the ID card.  An implanted stimulator: Bring the remote control.  A legal guardian: Bring a copy of the certified (court-stamped) guardianship papers.  Please remove any jewelry, including body piercings. Leave jewelry and other valuables at home.  If you're going home the day of surgery  You must have a responsible adult drive you home. They should stay with you overnight as well.  If you don't have someone to stay with you, and you aren't safe to go home alone, we may keep you overnight. Insurance often won't pay for this.  After surgery  If it's hard to control your pain or you need more pain medicine, please call your surgeon's office.  Questions?   If you have any questions for your care team, list them here: _________________________________________________________________________________________________________________________________________________________________________ ____________________________________ ____________________________________ ____________________________________  For informational purposes only. Not to replace the advice of your health care provider. Copyright   2003, 2019 [employer]. All rights reserved. Clinically reviewed by Latoya Antonio MD. [employer] 041011 - REV 12/22.    How to Take Your Medication Before Surgery  - HOLD (do not take) your estrogen two weeks before surgery. Continue Vimpat without change and take on day of surgery.    Patient Education   Here is the plan from today's visit    1.  Preop general physical exam    - CBC with platelets differential; Future  - Basic metabolic panel; Future    4. Healthcare maintenance    - CBC with platelets differential; Future  - Basic metabolic panel; Future      Please call or return to clinic if your symptoms don't go away.    Follow up plan  Return in about 2 months (around 1/15/2024), or Wellness visit and preventative care, for Follow up, Routine preventive.    Thank you for coming to [facility]  today.  Lab Testing:  **If you had lab testing today and your results are reassuring or normal they will be mailed to you or sent through [employer] within 7 days.   **If the lab tests need quick action we will call you with the results.  **If you are having labs done on a different day, please call [phone] to schedule at [facility] or [phone] for other [facility] locations. Labs do not offer walk-in appointments.  The phone number we will call with results is # [phone] (home) . If this is not the best number please call our clinic and change the number.  Medication Refills:  If you need any refills please call your pharmacy and they will contact us.   If you need to  your refill at a new pharmacy, please contact the new pharmacy directly. The new pharmacy will help you get your medications transferred faster.   Scheduling:  If you have any concerns about today's visit or wish to schedule another appointment please call our office during normal business hours [phone] (8-5:00 M-F). If you can no longer make a scheduled visit, please cancel via [employer] or call us to cancel.   If a referral was made to an [facility] specialty provider and you do not get a call from central scheduling, please refer to directions on your visit summary or call our office during normal business hours for assistance.   If a Mammogram was ordered for you at the Breast Center call [phone] to schedule or change your appointment.  If you had an XRay/CT/Ultrasound/MRI ordered the number is [phone] to schedule or change your radiology appointment.   [facility] has limited ultrasound appointments available on Wednesdays, if you would like your ultrasound at [facility], please call [phone] to schedule.   Medical Concerns:  If you have urgent medical concerns please call [phone] at any time of the day.    Cecilia Garza MD

## 2023-12-01 ENCOUNTER — ANESTHESIA EVENT (OUTPATIENT)
Dept: SURGERY | Facility: CLINIC | Age: 28
End: 2023-12-01
Payer: COMMERCIAL

## 2023-12-04 ENCOUNTER — HOSPITAL ENCOUNTER (INPATIENT)
Facility: CLINIC | Age: 28
LOS: 2 days | Discharge: HOME | End: 2023-12-06
Attending: UROLOGY | Admitting: UROLOGY
Payer: COMMERCIAL

## 2023-12-04 ENCOUNTER — ANESTHESIA (OUTPATIENT)
Dept: SURGERY | Facility: CLINIC | Age: 28
End: 2023-12-04
Payer: COMMERCIAL

## 2023-12-04 DIAGNOSIS — F64.0 GENDER DYSPHORIA IN ADULT: Chronic | ICD-10-CM

## 2023-12-04 DIAGNOSIS — G89.18 POSTOPERATIVE PAIN: Primary | ICD-10-CM

## 2023-12-04 PROBLEM — F64.9 GENDER DYSPHORIA: Status: ACTIVE | Noted: 2023-12-04

## 2023-12-04 LAB
ANION GAP SERPL CALCULATED.3IONS-SCNC: 12 MMOL/L (ref 7–15)
BUN SERPL-MCNC: 14.6 MG/DL (ref 6–20)
CALCIUM SERPL-MCNC: 8.6 MG/DL (ref 8.6–10)
CHLORIDE SERPL-SCNC: 103 MMOL/L (ref 98–107)
CREAT SERPL-MCNC: 0.93 MG/DL (ref 0.51–1.17)
DEPRECATED HCO3 PLAS-SCNC: 24 MMOL/L (ref 22–29)
EGFRCR SERPLBLD CKD-EPI 2021: 85 ML/MIN/1.73M2
GLUCOSE SERPL-MCNC: 155 MG/DL (ref 70–99)
HGB BLD-MCNC: 12.1 G/DL (ref 11.7–17.7)
POTASSIUM SERPL-SCNC: 3.7 MMOL/L (ref 3.4–5.3)
SODIUM SERPL-SCNC: 139 MMOL/L (ref 135–145)

## 2023-12-04 PROCEDURE — 250N000011 HC RX IP 250 OP 636: Performed by: UROLOGY

## 2023-12-04 PROCEDURE — 250N000009 HC RX 250: Performed by: UROLOGY

## 2023-12-04 PROCEDURE — 999N000141 HC STATISTIC PRE-PROCEDURE NURSING ASSESSMENT: Performed by: UROLOGY

## 2023-12-04 PROCEDURE — 57292 CONSTRUCT VAGINA WITH GRAFT: CPT | Mod: KX | Performed by: UROLOGY

## 2023-12-04 PROCEDURE — 85018 HEMOGLOBIN: CPT

## 2023-12-04 PROCEDURE — 55150 REMOVAL OF SCROTUM: CPT | Mod: KX | Performed by: UROLOGY

## 2023-12-04 PROCEDURE — 250N000013 HC RX MED GY IP 250 OP 250 PS 637: Performed by: STUDENT IN AN ORGANIZED HEALTH CARE EDUCATION/TRAINING PROGRAM

## 2023-12-04 PROCEDURE — 14301 TIS TRNFR ANY 30.1-60 SQ CM: CPT | Mod: XS | Performed by: UROLOGY

## 2023-12-04 PROCEDURE — 53410 RECONSTRUCTION OF URETHRA: CPT | Mod: KX | Performed by: UROLOGY

## 2023-12-04 PROCEDURE — 272N000001 HC OR GENERAL SUPPLY STERILE: Performed by: UROLOGY

## 2023-12-04 PROCEDURE — 80048 BASIC METABOLIC PNL TOTAL CA: CPT

## 2023-12-04 PROCEDURE — 0W4M070 CREATION OF VAGINA IN MALE PERINEUM WITH AUTOLOGOUS TISSUE SUBSTITUTE, OPEN APPROACH: ICD-10-PCS | Performed by: UROLOGY

## 2023-12-04 PROCEDURE — 250N000011 HC RX IP 250 OP 636: Mod: JZ | Performed by: ANESTHESIOLOGY

## 2023-12-04 PROCEDURE — 36415 COLL VENOUS BLD VENIPUNCTURE: CPT

## 2023-12-04 PROCEDURE — 360N000078 HC SURGERY LEVEL 5, PER MIN: Performed by: UROLOGY

## 2023-12-04 PROCEDURE — 250N000011 HC RX IP 250 OP 636: Mod: JZ

## 2023-12-04 PROCEDURE — 710N000010 HC RECOVERY PHASE 1, LEVEL 2, PER MIN: Performed by: UROLOGY

## 2023-12-04 PROCEDURE — 56805 CLITOROPLASTY INTERSEX STATE: CPT | Mod: KX | Performed by: UROLOGY

## 2023-12-04 PROCEDURE — 250N000011 HC RX IP 250 OP 636: Performed by: NURSE ANESTHETIST, CERTIFIED REGISTERED

## 2023-12-04 PROCEDURE — 250N000009 HC RX 250: Performed by: NURSE ANESTHETIST, CERTIFIED REGISTERED

## 2023-12-04 PROCEDURE — 258N000003 HC RX IP 258 OP 636: Performed by: NURSE ANESTHETIST, CERTIFIED REGISTERED

## 2023-12-04 PROCEDURE — 258N000003 HC RX IP 258 OP 636

## 2023-12-04 PROCEDURE — 88305 TISSUE EXAM BY PATHOLOGIST: CPT | Mod: 26 | Performed by: PATHOLOGY

## 2023-12-04 PROCEDURE — 54125 REMOVAL OF PENIS: CPT | Mod: KX | Performed by: UROLOGY

## 2023-12-04 PROCEDURE — 250N000025 HC SEVOFLURANE, PER MIN: Performed by: UROLOGY

## 2023-12-04 PROCEDURE — 120N000002 HC R&B MED SURG/OB UMMC

## 2023-12-04 PROCEDURE — 250N000013 HC RX MED GY IP 250 OP 250 PS 637

## 2023-12-04 PROCEDURE — 88305 TISSUE EXAM BY PATHOLOGIST: CPT | Mod: TC | Performed by: UROLOGY

## 2023-12-04 PROCEDURE — 370N000017 HC ANESTHESIA TECHNICAL FEE, PER MIN: Performed by: UROLOGY

## 2023-12-04 RX ORDER — LIDOCAINE 40 MG/G
CREAM TOPICAL
Status: DISCONTINUED | OUTPATIENT
Start: 2023-12-04 | End: 2023-12-04 | Stop reason: HOSPADM

## 2023-12-04 RX ORDER — ONDANSETRON 2 MG/ML
4 INJECTION INTRAMUSCULAR; INTRAVENOUS EVERY 30 MIN PRN
Status: DISCONTINUED | OUTPATIENT
Start: 2023-12-04 | End: 2023-12-05 | Stop reason: HOSPADM

## 2023-12-04 RX ORDER — HYDROMORPHONE HCL IN WATER/PF 6 MG/30 ML
0.2 PATIENT CONTROLLED ANALGESIA SYRINGE INTRAVENOUS
Status: DISCONTINUED | OUTPATIENT
Start: 2023-12-04 | End: 2023-12-06

## 2023-12-04 RX ORDER — AMOXICILLIN 250 MG
1 CAPSULE ORAL 2 TIMES DAILY
Status: DISCONTINUED | OUTPATIENT
Start: 2023-12-04 | End: 2023-12-06 | Stop reason: HOSPADM

## 2023-12-04 RX ORDER — SODIUM CHLORIDE, SODIUM LACTATE, POTASSIUM CHLORIDE, CALCIUM CHLORIDE 600; 310; 30; 20 MG/100ML; MG/100ML; MG/100ML; MG/100ML
INJECTION, SOLUTION INTRAVENOUS CONTINUOUS
Status: DISCONTINUED | OUTPATIENT
Start: 2023-12-04 | End: 2023-12-05 | Stop reason: HOSPADM

## 2023-12-04 RX ORDER — SODIUM CHLORIDE, SODIUM LACTATE, POTASSIUM CHLORIDE, CALCIUM CHLORIDE 600; 310; 30; 20 MG/100ML; MG/100ML; MG/100ML; MG/100ML
INJECTION, SOLUTION INTRAVENOUS CONTINUOUS PRN
Status: DISCONTINUED | OUTPATIENT
Start: 2023-12-04 | End: 2023-12-04

## 2023-12-04 RX ORDER — HEPARIN SODIUM 5000 [USP'U]/.5ML
5000 INJECTION, SOLUTION INTRAVENOUS; SUBCUTANEOUS
Status: COMPLETED | OUTPATIENT
Start: 2023-12-04 | End: 2023-12-04

## 2023-12-04 RX ORDER — SODIUM CHLORIDE, SODIUM LACTATE, POTASSIUM CHLORIDE, CALCIUM CHLORIDE 600; 310; 30; 20 MG/100ML; MG/100ML; MG/100ML; MG/100ML
INJECTION, SOLUTION INTRAVENOUS CONTINUOUS
Status: DISCONTINUED | OUTPATIENT
Start: 2023-12-04 | End: 2023-12-04 | Stop reason: HOSPADM

## 2023-12-04 RX ORDER — BUPIVACAINE HYDROCHLORIDE AND EPINEPHRINE 5; 5 MG/ML; UG/ML
INJECTION, SOLUTION EPIDURAL; INTRACAUDAL; PERINEURAL PRN
Status: DISCONTINUED | OUTPATIENT
Start: 2023-12-04 | End: 2023-12-04 | Stop reason: HOSPADM

## 2023-12-04 RX ORDER — ACETAMINOPHEN 325 MG/1
650 TABLET ORAL EVERY 4 HOURS PRN
Status: DISCONTINUED | OUTPATIENT
Start: 2023-12-07 | End: 2023-12-06 | Stop reason: HOSPADM

## 2023-12-04 RX ORDER — ONDANSETRON 4 MG/1
4 TABLET, ORALLY DISINTEGRATING ORAL EVERY 30 MIN PRN
Status: DISCONTINUED | OUTPATIENT
Start: 2023-12-04 | End: 2023-12-05 | Stop reason: HOSPADM

## 2023-12-04 RX ORDER — SIMETHICONE 80 MG
80 TABLET,CHEWABLE ORAL 4 TIMES DAILY PRN
Status: DISCONTINUED | OUTPATIENT
Start: 2023-12-04 | End: 2023-12-06 | Stop reason: HOSPADM

## 2023-12-04 RX ORDER — OXYCODONE HYDROCHLORIDE 5 MG/1
5 TABLET ORAL
Status: DISCONTINUED | OUTPATIENT
Start: 2023-12-04 | End: 2023-12-05 | Stop reason: HOSPADM

## 2023-12-04 RX ORDER — LANOLIN ALCOHOL/MO/W.PET/CERES
3 CREAM (GRAM) TOPICAL
Status: DISCONTINUED | OUTPATIENT
Start: 2023-12-04 | End: 2023-12-06 | Stop reason: HOSPADM

## 2023-12-04 RX ORDER — CEFAZOLIN SODIUM/WATER 2 G/20 ML
2 SYRINGE (ML) INTRAVENOUS SEE ADMIN INSTRUCTIONS
Status: DISCONTINUED | OUTPATIENT
Start: 2023-12-04 | End: 2023-12-04 | Stop reason: HOSPADM

## 2023-12-04 RX ORDER — ACETAMINOPHEN 325 MG/1
975 TABLET ORAL EVERY 8 HOURS
Status: DISCONTINUED | OUTPATIENT
Start: 2023-12-04 | End: 2023-12-06 | Stop reason: HOSPADM

## 2023-12-04 RX ORDER — BISACODYL 10 MG
10 SUPPOSITORY, RECTAL RECTAL DAILY PRN
Status: DISCONTINUED | OUTPATIENT
Start: 2023-12-04 | End: 2023-12-06 | Stop reason: HOSPADM

## 2023-12-04 RX ORDER — POLYETHYLENE GLYCOL 3350 17 G/17G
17 POWDER, FOR SOLUTION ORAL DAILY
Status: DISCONTINUED | OUTPATIENT
Start: 2023-12-05 | End: 2023-12-06 | Stop reason: HOSPADM

## 2023-12-04 RX ORDER — FAMOTIDINE 20 MG/1
20 TABLET, FILM COATED ORAL DAILY
Status: DISCONTINUED | OUTPATIENT
Start: 2023-12-04 | End: 2023-12-06 | Stop reason: HOSPADM

## 2023-12-04 RX ORDER — ONDANSETRON 4 MG/1
4 TABLET, ORALLY DISINTEGRATING ORAL EVERY 6 HOURS PRN
Status: DISCONTINUED | OUTPATIENT
Start: 2023-12-04 | End: 2023-12-06 | Stop reason: HOSPADM

## 2023-12-04 RX ORDER — LABETALOL HYDROCHLORIDE 5 MG/ML
20 INJECTION, SOLUTION INTRAVENOUS EVERY 6 HOURS PRN
Status: DISCONTINUED | OUTPATIENT
Start: 2023-12-04 | End: 2023-12-06 | Stop reason: HOSPADM

## 2023-12-04 RX ORDER — HYDROXYZINE HYDROCHLORIDE 25 MG/1
25 TABLET, FILM COATED ORAL EVERY 6 HOURS PRN
Status: DISCONTINUED | OUTPATIENT
Start: 2023-12-04 | End: 2023-12-06 | Stop reason: HOSPADM

## 2023-12-04 RX ORDER — DEXAMETHASONE SODIUM PHOSPHATE 4 MG/ML
INJECTION, SOLUTION INTRA-ARTICULAR; INTRALESIONAL; INTRAMUSCULAR; INTRAVENOUS; SOFT TISSUE PRN
Status: DISCONTINUED | OUTPATIENT
Start: 2023-12-04 | End: 2023-12-04

## 2023-12-04 RX ORDER — LIDOCAINE HYDROCHLORIDE 20 MG/ML
INJECTION, SOLUTION INFILTRATION; PERINEURAL PRN
Status: DISCONTINUED | OUTPATIENT
Start: 2023-12-04 | End: 2023-12-04

## 2023-12-04 RX ORDER — HYDROMORPHONE HCL IN WATER/PF 6 MG/30 ML
0.4 PATIENT CONTROLLED ANALGESIA SYRINGE INTRAVENOUS EVERY 5 MIN PRN
Status: DISCONTINUED | OUTPATIENT
Start: 2023-12-04 | End: 2023-12-05 | Stop reason: HOSPADM

## 2023-12-04 RX ORDER — FENTANYL CITRATE 50 UG/ML
50 INJECTION, SOLUTION INTRAMUSCULAR; INTRAVENOUS EVERY 5 MIN PRN
Status: DISCONTINUED | OUTPATIENT
Start: 2023-12-04 | End: 2023-12-05 | Stop reason: HOSPADM

## 2023-12-04 RX ORDER — LACOSAMIDE 100 MG/1
200 TABLET ORAL 2 TIMES DAILY
Status: DISCONTINUED | OUTPATIENT
Start: 2023-12-04 | End: 2023-12-06 | Stop reason: HOSPADM

## 2023-12-04 RX ORDER — FENTANYL CITRATE 50 UG/ML
25 INJECTION, SOLUTION INTRAMUSCULAR; INTRAVENOUS EVERY 5 MIN PRN
Status: DISCONTINUED | OUTPATIENT
Start: 2023-12-04 | End: 2023-12-05 | Stop reason: HOSPADM

## 2023-12-04 RX ORDER — OXYCODONE HYDROCHLORIDE 10 MG/1
10 TABLET ORAL EVERY 4 HOURS PRN
Status: DISCONTINUED | OUTPATIENT
Start: 2023-12-04 | End: 2023-12-06 | Stop reason: HOSPADM

## 2023-12-04 RX ORDER — PROCHLORPERAZINE MALEATE 5 MG
10 TABLET ORAL EVERY 6 HOURS PRN
Status: DISCONTINUED | OUTPATIENT
Start: 2023-12-04 | End: 2023-12-06 | Stop reason: HOSPADM

## 2023-12-04 RX ORDER — LIDOCAINE 40 MG/G
CREAM TOPICAL
Status: DISCONTINUED | OUTPATIENT
Start: 2023-12-04 | End: 2023-12-06 | Stop reason: HOSPADM

## 2023-12-04 RX ORDER — OXYCODONE HYDROCHLORIDE 10 MG/1
10 TABLET ORAL
Status: DISCONTINUED | OUTPATIENT
Start: 2023-12-04 | End: 2023-12-05 | Stop reason: HOSPADM

## 2023-12-04 RX ORDER — ONDANSETRON 2 MG/ML
4 INJECTION INTRAMUSCULAR; INTRAVENOUS EVERY 6 HOURS PRN
Status: DISCONTINUED | OUTPATIENT
Start: 2023-12-04 | End: 2023-12-06 | Stop reason: HOSPADM

## 2023-12-04 RX ORDER — ONDANSETRON 2 MG/ML
INJECTION INTRAMUSCULAR; INTRAVENOUS PRN
Status: DISCONTINUED | OUTPATIENT
Start: 2023-12-04 | End: 2023-12-04

## 2023-12-04 RX ORDER — PROPOFOL 10 MG/ML
INJECTION, EMULSION INTRAVENOUS PRN
Status: DISCONTINUED | OUTPATIENT
Start: 2023-12-04 | End: 2023-12-04

## 2023-12-04 RX ORDER — CEFAZOLIN SODIUM/WATER 2 G/20 ML
2 SYRINGE (ML) INTRAVENOUS
Status: COMPLETED | OUTPATIENT
Start: 2023-12-04 | End: 2023-12-04

## 2023-12-04 RX ORDER — LACOSAMIDE 200 MG/1
200 TABLET ORAL ONCE
Status: COMPLETED | OUTPATIENT
Start: 2023-12-04 | End: 2023-12-04

## 2023-12-04 RX ORDER — HYDROMORPHONE HCL IN WATER/PF 6 MG/30 ML
0.2 PATIENT CONTROLLED ANALGESIA SYRINGE INTRAVENOUS EVERY 5 MIN PRN
Status: DISCONTINUED | OUTPATIENT
Start: 2023-12-04 | End: 2023-12-05 | Stop reason: HOSPADM

## 2023-12-04 RX ORDER — OXYCODONE HYDROCHLORIDE 5 MG/1
5 TABLET ORAL EVERY 4 HOURS PRN
Status: DISCONTINUED | OUTPATIENT
Start: 2023-12-04 | End: 2023-12-06 | Stop reason: HOSPADM

## 2023-12-04 RX ORDER — SODIUM CHLORIDE 9 MG/ML
INJECTION, SOLUTION INTRAVENOUS CONTINUOUS
Status: DISCONTINUED | OUTPATIENT
Start: 2023-12-04 | End: 2023-12-06

## 2023-12-04 RX ORDER — FENTANYL CITRATE 50 UG/ML
INJECTION, SOLUTION INTRAMUSCULAR; INTRAVENOUS PRN
Status: DISCONTINUED | OUTPATIENT
Start: 2023-12-04 | End: 2023-12-04

## 2023-12-04 RX ORDER — HYDROMORPHONE HCL IN WATER/PF 6 MG/30 ML
0.4 PATIENT CONTROLLED ANALGESIA SYRINGE INTRAVENOUS
Status: DISCONTINUED | OUTPATIENT
Start: 2023-12-04 | End: 2023-12-06

## 2023-12-04 RX ADMIN — SODIUM CHLORIDE, POTASSIUM CHLORIDE, SODIUM LACTATE AND CALCIUM CHLORIDE: 600; 310; 30; 20 INJECTION, SOLUTION INTRAVENOUS at 08:45

## 2023-12-04 RX ADMIN — FENTANYL CITRATE 25 MCG: 50 INJECTION, SOLUTION INTRAMUSCULAR; INTRAVENOUS at 11:05

## 2023-12-04 RX ADMIN — OXYCODONE HYDROCHLORIDE 5 MG: 5 TABLET ORAL at 18:35

## 2023-12-04 RX ADMIN — FENTANYL CITRATE 50 MCG: 50 INJECTION INTRAMUSCULAR; INTRAVENOUS at 08:29

## 2023-12-04 RX ADMIN — FAMOTIDINE 20 MG: 20 TABLET ORAL at 21:57

## 2023-12-04 RX ADMIN — PROPOFOL 200 MG: 10 INJECTION, EMULSION INTRAVENOUS at 07:46

## 2023-12-04 RX ADMIN — FENTANYL CITRATE 100 MCG: 50 INJECTION INTRAMUSCULAR; INTRAVENOUS at 09:48

## 2023-12-04 RX ADMIN — ONDANSETRON 4 MG: 2 INJECTION INTRAMUSCULAR; INTRAVENOUS at 11:07

## 2023-12-04 RX ADMIN — ONDANSETRON 4 MG: 2 INJECTION INTRAMUSCULAR; INTRAVENOUS at 10:18

## 2023-12-04 RX ADMIN — HEPARIN SODIUM 5000 UNITS: 5000 INJECTION, SOLUTION INTRAVENOUS; SUBCUTANEOUS at 06:11

## 2023-12-04 RX ADMIN — Medication 50 MG: at 07:46

## 2023-12-04 RX ADMIN — FENTANYL CITRATE 50 MCG: 50 INJECTION, SOLUTION INTRAMUSCULAR; INTRAVENOUS at 11:53

## 2023-12-04 RX ADMIN — HYDROMORPHONE HYDROCHLORIDE 0.5 MG: 1 INJECTION, SOLUTION INTRAMUSCULAR; INTRAVENOUS; SUBCUTANEOUS at 08:38

## 2023-12-04 RX ADMIN — LACOSAMIDE 200 MG: 100 TABLET, FILM COATED ORAL at 21:46

## 2023-12-04 RX ADMIN — SUGAMMADEX 200 MG: 100 INJECTION, SOLUTION INTRAVENOUS at 10:33

## 2023-12-04 RX ADMIN — FENTANYL CITRATE 50 MCG: 50 INJECTION, SOLUTION INTRAMUSCULAR; INTRAVENOUS at 11:15

## 2023-12-04 RX ADMIN — LIDOCAINE HYDROCHLORIDE 100 MG: 20 INJECTION, SOLUTION INFILTRATION; PERINEURAL at 07:46

## 2023-12-04 RX ADMIN — SODIUM CHLORIDE: 9 INJECTION, SOLUTION INTRAVENOUS at 11:27

## 2023-12-04 RX ADMIN — FENTANYL CITRATE 25 MCG: 50 INJECTION, SOLUTION INTRAMUSCULAR; INTRAVENOUS at 11:10

## 2023-12-04 RX ADMIN — Medication 20 MG: at 08:20

## 2023-12-04 RX ADMIN — ACETAMINOPHEN 975 MG: 325 TABLET, FILM COATED ORAL at 13:52

## 2023-12-04 RX ADMIN — Medication 2 G: at 07:35

## 2023-12-04 RX ADMIN — FENTANYL CITRATE 50 MCG: 50 INJECTION INTRAMUSCULAR; INTRAVENOUS at 08:11

## 2023-12-04 RX ADMIN — DEXMEDETOMIDINE HYDROCHLORIDE 8 MCG: 100 INJECTION, SOLUTION INTRAVENOUS at 10:42

## 2023-12-04 RX ADMIN — DEXMEDETOMIDINE HYDROCHLORIDE 8 MCG: 100 INJECTION, SOLUTION INTRAVENOUS at 10:48

## 2023-12-04 RX ADMIN — FENTANYL CITRATE 100 MCG: 50 INJECTION INTRAMUSCULAR; INTRAVENOUS at 07:46

## 2023-12-04 RX ADMIN — Medication 30 MG: at 09:30

## 2023-12-04 RX ADMIN — LACOSAMIDE 200 MG: 200 TABLET, FILM COATED ORAL at 16:46

## 2023-12-04 RX ADMIN — MIDAZOLAM 2 MG: 1 INJECTION INTRAMUSCULAR; INTRAVENOUS at 07:37

## 2023-12-04 RX ADMIN — FENTANYL CITRATE 50 MCG: 50 INJECTION INTRAMUSCULAR; INTRAVENOUS at 07:43

## 2023-12-04 RX ADMIN — HYDROMORPHONE HYDROCHLORIDE 0.2 MG: 0.2 INJECTION, SOLUTION INTRAMUSCULAR; INTRAVENOUS; SUBCUTANEOUS at 20:22

## 2023-12-04 RX ADMIN — SODIUM CHLORIDE, POTASSIUM CHLORIDE, SODIUM LACTATE AND CALCIUM CHLORIDE: 600; 310; 30; 20 INJECTION, SOLUTION INTRAVENOUS at 07:42

## 2023-12-04 RX ADMIN — OXYCODONE HYDROCHLORIDE 5 MG: 5 TABLET ORAL at 13:52

## 2023-12-04 RX ADMIN — ACETAMINOPHEN 975 MG: 325 TABLET, FILM COATED ORAL at 21:45

## 2023-12-04 RX ADMIN — DEXAMETHASONE SODIUM PHOSPHATE 8 MG: 4 INJECTION, SOLUTION INTRA-ARTICULAR; INTRALESIONAL; INTRAMUSCULAR; INTRAVENOUS; SOFT TISSUE at 07:57

## 2023-12-04 RX ADMIN — FENTANYL CITRATE 50 MCG: 50 INJECTION, SOLUTION INTRAMUSCULAR; INTRAVENOUS at 11:25

## 2023-12-04 RX ADMIN — DEXMEDETOMIDINE HYDROCHLORIDE 8 MCG: 100 INJECTION, SOLUTION INTRAVENOUS at 10:44

## 2023-12-04 ASSESSMENT — ACTIVITIES OF DAILY LIVING (ADL)
ADLS_ACUITY_SCORE: 18

## 2023-12-04 ASSESSMENT — ENCOUNTER SYMPTOMS: SEIZURES: 1

## 2023-12-04 NOTE — ANESTHESIA CARE TRANSFER NOTE
Patient: Kun Boyle    Procedure: Procedure(s):  Minimal Depth Vaginoplasty       Diagnosis: Gender dysphoria [F64.9]  Diagnosis Additional Information: No value filed.    Anesthesia Type:   General     Note:    Oropharynx: oropharynx clear of all foreign objects and spontaneously breathing  Level of Consciousness: drowsy  Oxygen Supplementation: nasal cannula  Level of Supplemental Oxygen (L/min / FiO2): 5  Independent Airway: airway patency satisfactory and stable  Dentition: dentition unchanged  Vital Signs Stable: post-procedure vital signs reviewed and stable  Report to RN Given: handoff report given  Patient transferred to: PACU    Handoff Report: Identifed the Patient, Identified the Reponsible Provider, Reviewed the pertinent medical history, Discussed the surgical course, Reviewed Intra-OP anesthesia mangement and issues during anesthesia, Set expectations for post-procedure period and Allowed opportunity for questions and acknowledgement of understanding    Vitals:  Vitals Value Taken Time   /67 12/04/23 1051   Temp     Pulse 82 12/04/23 1053   Resp     SpO2 87 % 12/04/23 1053   Vitals shown include unfiled device data.    Electronically Signed By: Flores Jenkins, JOSE ALBERTO CRNA  December 4, 2023  10:53 AM

## 2023-12-04 NOTE — ANESTHESIA POSTPROCEDURE EVALUATION
Patient: Kun Boyle    Procedure: Procedure(s):  Minimal Depth Vaginoplasty       Anesthesia Type:  General    Note:  Disposition: Inpatient   Postop Pain Control: Uneventful            Sign Out: Well controlled pain   PONV: No   Neuro/Psych: Uneventful            Sign Out: Acceptable/Baseline neuro status   Airway/Respiratory: Uneventful            Sign Out: Acceptable/Baseline resp. status   CV/Hemodynamics: Uneventful            Sign Out: Acceptable CV status; No obvious hypovolemia; No obvious fluid overload   Other NRE: NONE   DID A NON-ROUTINE EVENT OCCUR? No           Last vitals:  Vitals Value Taken Time   /67 12/04/23 1200   Temp 36.6  C (97.8  F) 12/04/23 1055   Pulse 104 12/04/23 1206   Resp 16 12/04/23 1200   SpO2 98 % 12/04/23 1206   Vitals shown include unfiled device data.    Electronically Signed By: Rodriguez Dumont MD  December 4, 2023  12:06 PM

## 2023-12-04 NOTE — ANESTHESIA PREPROCEDURE EVALUATION
Anesthesia Pre-Procedure Evaluation    Patient: Kun Boyle   MRN: [id number] : 1995        Procedure : Procedure(s):  Minimal Depth Vaginoplasty          Past Medical History:   Diagnosis Date    Anxiety     Asthma     Depression     Dermatitis 2021    Seizures (H)     Sleep apnea       Past Surgical History:   Procedure Laterality Date    C EACH ADD TOOTH EXTRACTION      ORCHIECTOMY SCROTAL Bilateral 2019    Procedure: BILATERAL SIMPLE SCROTAL ORCHIECTOMY;  Surgeon: Toribio Morales MD;  Location: [facility] OR    TONSILLECTOMY      10 years old    TONSILLECTOMY, ADENOIDECTOMY, COMBINED        Allergies   Allergen Reactions    Codeine Hives     Difficulty breathing, throat swelling..Happen approximately 14 years ago     Codeine Swelling    Nickel Rash      Social History     Tobacco Use    Smoking status: Never    Smokeless tobacco: Never   Substance Use Topics    Alcohol use: Yes     Comment: occasional, socially      Wt Readings from Last 1 Encounters:   23 96.2 kg (212 lb 1.3 oz)        Anesthesia Evaluation   Pt has had prior anesthetic. Type: General.        ROS/MED HX  ENT/Pulmonary:     (+)                     asthma                  Neurologic:     (+)       seizures, last seizure: years ago,                        Cardiovascular:       METS/Exercise Tolerance:     Hematologic:       Musculoskeletal:       GI/Hepatic:       Renal/Genitourinary:       Endo:       Psychiatric/Substance Use:       Infectious Disease:       Malignancy:       Other:            Physical Exam    Airway        Mallampati: II    Neck ROM: full     Respiratory Devices and Support         Dental       (+) Minor Abnormalities - some fillings, tiny chips      Cardiovascular   cardiovascular exam normal          Pulmonary   pulmonary exam normal                OUTSIDE LABS:  CBC:   Lab Results   Component Value Date    WBC 7.2 11/15/2023    WBC 10.5 2020    HGB 13.3 11/15/2023    HGB 13.7 2021    HCT  "40.6 11/15/2023    HCT 38.1 09/02/2020     11/15/2023     09/02/2020     BMP:   Lab Results   Component Value Date     11/15/2023    .5 03/02/2021    POTASSIUM 3.6 11/15/2023    POTASSIUM 3.8 03/02/2021    CHLORIDE 103 11/15/2023    CHLORIDE 100.2 03/02/2021    CO2 26 11/15/2023    CO2 29.9 03/02/2021    BUN 14.5 11/15/2023    BUN 10.5 03/02/2021    CR 0.85 11/15/2023    CR 0.7 03/02/2021     (H) 11/15/2023    GLC 99.4 (H) 03/02/2021    GLC 97.9 03/02/2021     COAGS: No results found for: \"PTT\", \"INR\", \"FIBR\"  POC:   Lab Results   Component Value Date     (H) 09/02/2020     HEPATIC:   Lab Results   Component Value Date    ALBUMIN 3.4 09/02/2020    PROTTOTAL 7.4 03/02/2021    ALT 20.7 03/02/2021    AST 15.6 03/02/2021    ALKPHOS 40.0 03/02/2021    BILITOTAL <0.4 03/02/2021     OTHER:   Lab Results   Component Value Date    LACT 0.6 (L) 09/02/2020    A1C 5.4 08/13/2021    SHAWN 9.3 11/15/2023       Anesthesia Plan    ASA Status:  2    NPO Status:  NPO Appropriate    Anesthesia Type: General.     - Airway: ETT   Induction: Intravenous.   Maintenance: Balanced.   Techniques and Equipment:     - Lines/Monitors: 2nd IV     Consents    Anesthesia Plan(s) and associated risks, benefits, and realistic alternatives discussed. Questions answered and patient/representative(s) expressed understanding.     - Discussed: Risks, Benefits and Alternatives for BOTH SEDATION and the PROCEDURE were discussed     - Discussed with:  Patient       Use of blood products discussed: Yes.     - Discussed with: Patient.     Postoperative Care    Pain management: IV analgesics.   PONV prophylaxis: Ondansetron (or other 5HT-3)     Comments:               Tc Askew MD    I have reviewed the pertinent notes and labs in the chart from the past 30 days and (re)examined the patient.  Any updates or changes from those notes are reflected in this note.              # Obesity: Estimated body mass index is " "32.25 kg/m  as calculated from the following:    Height as of this encounter: 1.727 m (5' 8\").    Weight as of this encounter: 96.2 kg (212 lb 1.3 oz).      "

## 2023-12-04 NOTE — ANESTHESIA PROCEDURE NOTES
Airway       Patient location during procedure: OR       Procedure Start/Stop Times: 12/4/2023 7:54 AM  Staff -        CRNA: Flores Jenkins APRN CRNA       Performed By: CRNA  Consent for Airway        Urgency: elective  Indications and Patient Condition       Indications for airway management: yareli-procedural       Induction type:intravenous       Mask difficulty assessment: 1 - vent by mask    Final Airway Details       Final airway type: endotracheal airway       Successful airway: ETT - single  Endotracheal Airway Details        ETT size (mm): 7.5       Cuffed: yes       Successful intubation technique: direct laryngoscopy       DL Blade Type: Kauffman 2       Grade View of Cords: 1       Adjucts: stylet       Position: Right       Measured from: gums/teeth       Secured at (cm): 22       Bite block used: None    Post intubation assessment        Placement verified by: capnometry, equal breath sounds and chest rise        Number of attempts at approach: 1       Secured with: silk tape       Ease of procedure: easy       Dentition: Intact and Unchanged    Medication(s) Administered   Medication Administration Time: 12/4/2023 7:54 AM

## 2023-12-04 NOTE — OP NOTE
OPERATIVE REPORT     PREOPERATIVE DIAGNOSIS: Gender Dysphoria  POSTOPERATIVE DIAGNOSIS: Gender Dysphoria  DATE OF SURGERY:12/4/2023     PROCEDURE:   Penile Inversion Minimal Depth Vaginoplasty which includes:  1. Clitoroplasty   2. Total Penectomy  3. Urethroplasty   4. Construction of Minimal Depth Artificial Vagina   5. Scrotectomy  6. Bilateral labiaplasty via adjacent tissue transfer of scrotal and mons skin flaps, size 12 x 5 cm on left and 12 x 5 cm on right.       SURGEON: Toribio Morales MD  FELLOW: Edinson Padgett MD  RESIDENT: Vargas Alex MD  ANESTHESIA: General  EBL: 100cc  DRAINS: 16 Fr Cheney, Small Packing x2 in Vagina       INDICATIONS:    Kun Boyle is a 28 year old transgender female patient with gender dysphoria. She meets WPATH guidelines for gender affirming surgery. She elects for minimal depth vaginoplasty. She had prior orchiectomy. The risks, benefits and alternatives of the multiple treatment options were discussed with them and they elected to proceed. Specifically, this included, but was not limited to: wound infection, anesthesia risks, blood clots, urethral stricture, inadequate vaginal depth, and rectal injury.      DESCRIPTION OF PROCEDURE: After informed consent was obtained and preoperative antibiotics were given, the patient was taken to the operating room, placed supine on the operating table. SCDs and preoperative subcutaneous heparin were utilized for VTE prophylaxis. General anesthesia was induced and they were intubated. The patient was placed in lithotomy. The genitalia and lower abdomen were prepped and draped in a sterile fashion. A timeout was performed.     We then made a circumcising skin incision with care to leave a mucosal collar beneath the glans. This was dissected down to Goodwin's fascia. The penis was degloved, which left a circumferential penile skin tube.       We made a midline scrotal incision, which was taken down to the level of the urethra and the penis  was brought through this.      We then turned our attention to the glans in order to perform a clitoroplasty. A W-shaped incision was marked along the glans to create the clitoris. Bovie cautery was used to delineate the lateral borders of the neurovascular bundle along the penile shaft. A combination of blunt and sharp dissection was used to dissect the penile glans and its neurovascular bundle. Dissection was performed along the under surface of the tunica albuginea - freeing the corporal spongy tissue. This ensured that the neurovascular bundle was left unharmed. The neoclitoris was then shaped and sutured in a narrow tubular stricture and the mucosal collar was shaped to form a clitoral colon.      After clitoroplasty, the anterior urethra was freed from the corporal bodies. Circumferential mobilization was performed to the distal bulbar urethra using sharp dissection.       A 16 Fr urethral Cheney catheter was placed. We then dissected the cavernosal bodies to the level of the pubic symphysis and then amputated the corporal bodies bilaterally at this level. The specimen was then passed off to pathology. The stumps were closed with running PDS suture.      A midline scrotal incision was designed to meet the previous scrotal incision. Midline dissection was used to identify the corpus spongiosum and the bulbospongiosus muscle.       Dissection was continued around the bulbospongiosus muscle to identify the ischiocavernosal muscles. The bulbospongiosus muscle was sharply taken off the bulbar urethra and laterally, and left intact posteriorly. At this point, sharp dissection was continued along the base of the bulbar urethra. The central tendon was released, which allowed for urethral mobility.       Using sharp dissection a space was developed between the urethra and prostate anteriorly and the rectum posteriorly. We stopped after division of the central tendon and some infraprostatic dissection to allow for an  internalized vaginal canal without significant depth.       The neurovascular bundle was then folded such that the neoclitoris was positioned on the pubis. The clitoris was tacked in position using absorbable suture.      We then turned our attention to completing the urethroplasty. The urethra was transected at the level of the distal bulbar urethra. The urethra was spatulated for about 4cm ventrally, allowing the dorsal aspect to be sewn to the neoclitoral and clitoral colon skin dorsally using 3-0 Vicryl interrupted suture - thus creating a mucosal, lubricated inner vulvar vestibule.  The bulbar urethra was debulked and oversewn. The urethral spatulation ended at the distal bulbar urethra. The edges of the urethrotomy was oversewn to decrease bleeding and [place] the mucosa.       We then turned our attention to creation of a midline opening superior to the penile skin tube. This midline opening would later become the opening for the clitoris and urethra. Based on this location, I marked out the areas of scrotum that would need to be resected. I then performed excision of the excess scrotal tissue. These were two separate segments.      A small portion of the vaginal tube was excised to create an appropriate sized penile skin flap for the small, minimal depth canal. The tip of the vaginal tube was closed using absorbable suture and sutured into place at the depth of our vaginal dissection with multiple absorbable interrupted sutures. We placed PDS sutures to tack the skin tube to its appropriate location     I designed bilateral labia majora using anteriorly perfused scrotal and mons skin. These labial flaps were 12x5cm per side. These were fashioned to create a feminine appearing labia. They were advanced posteriorly and sutured into place using 3-0 Vicryl and Monocryl sutures in two layers.     The ventral urethra was then sutured to the posterior aspect of the superiorly placed opening to complete the  urethroplasty using 4-0 Vicryl suture. There was excellent urethral mucosal eversion with pink mucosa evident.     Xeroform packing was placed in the vagina. All counts were correct at the end of the case.  The clitoris was pink and viable at the end of the case.  The Cheney was intact and draining clear urine.  A pressure dressing was applied and sutured in place using Prolene sutures.  The patient was then extubated, awakened, and transferred to the postop area in stable condition.    As attending surgeon, Toribio DAVEY MD, was scrubbed and present for the entire procedure.

## 2023-12-04 NOTE — BRIEF OP NOTE
[facility]    Brief Operative Note    Pre-operative diagnosis: Gender dysphoria [F64.9]  Post-operative diagnosis Same as pre-operative diagnosis    Procedure: Minimal Depth Vaginoplasty, N/A - Vulva    Surgeon: Surgeon(s) and Role:     * Toribio Morales MD - Primary     * Vargas Alex MD - Resident - Assisting     * Edinson Padgett MD - Fellow - Assisting  Anesthesia: General   Estimated Blood Loss: 200 mL       Drains: Cheney catheter   Specimens:   ID Type Source Tests Collected by Time Destination   1 : Penis and urethra Tissue Other SURGICAL PATHOLOGY EXAM Toribio Morales MD 12/4/2023  8:53 AM    2 : scrotum Tissue Scrotum SURGICAL PATHOLOGY EXAM Toribio Morales MD 12/4/2023  9:51 AM      Findings:   Unremarkable minimal depth  .  Complications: None.  Implants: * No implants in log *

## 2023-12-05 ENCOUNTER — APPOINTMENT (OUTPATIENT)
Dept: PHYSICAL THERAPY | Facility: CLINIC | Age: 28
End: 2023-12-05
Payer: COMMERCIAL

## 2023-12-05 LAB
ANION GAP SERPL CALCULATED.3IONS-SCNC: 8 MMOL/L (ref 7–15)
BUN SERPL-MCNC: 8.4 MG/DL (ref 6–20)
CALCIUM SERPL-MCNC: 8.5 MG/DL (ref 8.6–10)
CHLORIDE SERPL-SCNC: 102 MMOL/L (ref 98–107)
CREAT SERPL-MCNC: 0.69 MG/DL (ref 0.51–1.17)
DEPRECATED HCO3 PLAS-SCNC: 26 MMOL/L (ref 22–29)
EGFRCR SERPLBLD CKD-EPI 2021: >90 ML/MIN/1.73M2
ERYTHROCYTE [DISTWIDTH] IN BLOOD BY AUTOMATED COUNT: 12.5 % (ref 10–15)
GLUCOSE SERPL-MCNC: 129 MG/DL (ref 70–99)
HCT VFR BLD AUTO: 33.9 % (ref 35–53)
HGB BLD-MCNC: 11.1 G/DL (ref 11.7–17.7)
MCH RBC QN AUTO: 28.2 PG (ref 26.5–33)
MCHC RBC AUTO-ENTMCNC: 32.7 G/DL (ref 31.5–36.5)
MCV RBC AUTO: 86 FL (ref 78–100)
PLATELET # BLD AUTO: 230 10E3/UL (ref 150–450)
POTASSIUM SERPL-SCNC: 3.8 MMOL/L (ref 3.4–5.3)
RBC # BLD AUTO: 3.93 10E6/UL (ref 3.8–5.9)
SODIUM SERPL-SCNC: 136 MMOL/L (ref 135–145)
WBC # BLD AUTO: 18 10E3/UL (ref 4–11)

## 2023-12-05 PROCEDURE — 258N000003 HC RX IP 258 OP 636

## 2023-12-05 PROCEDURE — 250N000011 HC RX IP 250 OP 636: Performed by: STUDENT IN AN ORGANIZED HEALTH CARE EDUCATION/TRAINING PROGRAM

## 2023-12-05 PROCEDURE — 120N000002 HC R&B MED SURG/OB UMMC

## 2023-12-05 PROCEDURE — 250N000013 HC RX MED GY IP 250 OP 250 PS 637

## 2023-12-05 PROCEDURE — 97116 GAIT TRAINING THERAPY: CPT | Mod: GP

## 2023-12-05 PROCEDURE — 36415 COLL VENOUS BLD VENIPUNCTURE: CPT

## 2023-12-05 PROCEDURE — 85027 COMPLETE CBC AUTOMATED: CPT

## 2023-12-05 PROCEDURE — 80048 BASIC METABOLIC PNL TOTAL CA: CPT

## 2023-12-05 PROCEDURE — 97161 PT EVAL LOW COMPLEX 20 MIN: CPT | Mod: GP

## 2023-12-05 PROCEDURE — 97530 THERAPEUTIC ACTIVITIES: CPT | Mod: GP

## 2023-12-05 RX ORDER — NALOXONE HYDROCHLORIDE 0.4 MG/ML
0.4 INJECTION, SOLUTION INTRAMUSCULAR; INTRAVENOUS; SUBCUTANEOUS
Status: DISCONTINUED | OUTPATIENT
Start: 2023-12-05 | End: 2023-12-06 | Stop reason: HOSPADM

## 2023-12-05 RX ORDER — CEFAZOLIN SODIUM 1 G/3ML
1 INJECTION, POWDER, FOR SOLUTION INTRAMUSCULAR; INTRAVENOUS EVERY 8 HOURS
Status: DISCONTINUED | OUTPATIENT
Start: 2023-12-05 | End: 2023-12-06 | Stop reason: HOSPADM

## 2023-12-05 RX ORDER — NALOXONE HYDROCHLORIDE 0.4 MG/ML
0.2 INJECTION, SOLUTION INTRAMUSCULAR; INTRAVENOUS; SUBCUTANEOUS
Status: DISCONTINUED | OUTPATIENT
Start: 2023-12-05 | End: 2023-12-06 | Stop reason: HOSPADM

## 2023-12-05 RX ADMIN — SENNOSIDES AND DOCUSATE SODIUM 1 TABLET: 8.6; 5 TABLET ORAL at 08:03

## 2023-12-05 RX ADMIN — SODIUM CHLORIDE: 9 INJECTION, SOLUTION INTRAVENOUS at 06:56

## 2023-12-05 RX ADMIN — SENNOSIDES AND DOCUSATE SODIUM 1 TABLET: 8.6; 5 TABLET ORAL at 00:03

## 2023-12-05 RX ADMIN — LACOSAMIDE 200 MG: 100 TABLET, FILM COATED ORAL at 19:38

## 2023-12-05 RX ADMIN — SENNOSIDES AND DOCUSATE SODIUM 1 TABLET: 8.6; 5 TABLET ORAL at 19:38

## 2023-12-05 RX ADMIN — ACETAMINOPHEN 975 MG: 325 TABLET, FILM COATED ORAL at 05:19

## 2023-12-05 RX ADMIN — OXYCODONE HYDROCHLORIDE 5 MG: 5 TABLET ORAL at 09:50

## 2023-12-05 RX ADMIN — SODIUM CHLORIDE: 9 INJECTION, SOLUTION INTRAVENOUS at 16:36

## 2023-12-05 RX ADMIN — FAMOTIDINE 20 MG: 20 TABLET ORAL at 08:03

## 2023-12-05 RX ADMIN — LACOSAMIDE 200 MG: 100 TABLET, FILM COATED ORAL at 08:07

## 2023-12-05 RX ADMIN — CEFAZOLIN 1 G: 1 INJECTION, POWDER, FOR SOLUTION INTRAMUSCULAR; INTRAVENOUS at 16:36

## 2023-12-05 RX ADMIN — ACETAMINOPHEN 975 MG: 325 TABLET, FILM COATED ORAL at 14:20

## 2023-12-05 RX ADMIN — POLYETHYLENE GLYCOL 3350 17 G: 17 POWDER, FOR SOLUTION ORAL at 08:04

## 2023-12-05 RX ADMIN — OXYCODONE HYDROCHLORIDE 10 MG: 10 TABLET ORAL at 02:45

## 2023-12-05 ASSESSMENT — ACTIVITIES OF DAILY LIVING (ADL)
ADLS_ACUITY_SCORE: 22
ADLS_ACUITY_SCORE: 20
ADLS_ACUITY_SCORE: 22
ADLS_ACUITY_SCORE: 20
ADLS_ACUITY_SCORE: 22
ADLS_ACUITY_SCORE: 20
ADLS_ACUITY_SCORE: 20
ADLS_ACUITY_SCORE: 18

## 2023-12-05 NOTE — PLAN OF CARE
"Goal Outcome Evaluation:    Plan of Care Reviewed With: patient  Overall Patient Progress: improving    POD#1 s/p minimal depth vaginoplasty.     /54 (BP Location: Right arm)   Pulse 75   Temp 98.2  F (36.8  C) (Oral)   Resp 13   Ht 1.727 m (5' 8\")   Wt 98.4 kg (216 lb 14.4 oz)   SpO2 97%   BMI 32.98 kg/m      A+Ox4, neuros intact.  HR regular, LS clear-IS done.  +BS, +flatus, no BM yet.  Vogt with good uop.  Regular diet, tolerating it well.  Up with assist of 1 with activity.  Pain in perineal area controlled with prn oxycodone and sched tylenol.  Bilateral fgroin incision c/d/I, bolster is intact- with dried blood.  Left piv infusing.   P: to have vogt and bolster removed tomorrow.      "

## 2023-12-05 NOTE — PROGRESS NOTES
"   12/05/23 1101   Appointment Info   Signing Clinician's Name / Credentials (PT) Goldie Brooks PT, DPT   Rehab Comments (PT) abd prec, penguin walk. anticipate dc 12/6   Living Environment   People in Home spouse   Current Living Arrangements apartment   Home Accessibility stairs to enter home   Number of Stairs, Main Entrance 3   Stair Railings, Main Entrance railing on right side (ascending)   Transportation Anticipated car, drives self;family or friend will provide   Living Environment Comments Pt lives with spouse in single level apartment, states 3 Stairs to get in/out. Pt Spouse is also recovering from surgery, Mom and Aunt will be staying/stopping by periodically to assist with ehavy ADLs. Also has a network of friends.   Self-Care   Usual Activity Tolerance good   Current Activity Tolerance good   Regular Exercise No   Equipment Currently Used at Home none   Fall history within last six months no   Activity/Exercise/Self-Care Comment pt IND with all ADLs and mobility at baseline, Works full time out of the home.   General Information   Onset of Illness/Injury or Date of Surgery 12/04/23   Referring Physician Jamaal Pelayo MD   Patient/Family Therapy Goals Statement (PT) return home   Pertinent History of Current Problem (include personal factors and/or comorbidities that impact the POC) per EMR \"28 year old y/o adult POD#1 s/p minimal depth vaginoplasty. \"   Existing Precautions/Restrictions abdominal  (penguin walk)   General Observations activity: ambulate   Cognition   Affect/Mental Status (Cognition) WFL   Pain Assessment   Patient Currently in Pain Yes, see Vital Sign flowsheet   Integumentary/Edema   Integumentary/Edema Comments consistent with procedure   Posture    Posture Protracted shoulders   Range of Motion (ROM)   Range of Motion ROM deficits secondary to surgical procedure   Strength (Manual Muscle Testing)   Strength Comments post op deconditioning   Bed Mobility   Comment, (Bed Mobility) " mod A x1 supine > sit, pain   Transfers   Comment, (Transfers) SBA SL > Stand, painful   Gait/Stairs (Locomotion)   Comment, (Gait/Stairs) SBA x5 ft from bed, pain, flexed forward posture   Balance   Balance Comments impaired standing balance with post op precautions   Sensory Examination   Sensory Perception patient reports no sensory changes   Clinical Impression   Criteria for Skilled Therapeutic Intervention Yes, treatment indicated   PT Diagnosis (PT) impaired functional mobility   Influenced by the following impairments pain, activity tolerance, post op precautions   Functional limitations due to impairments bed mobility, transfers, gait, stairs   Clinical Presentation (PT Evaluation Complexity) evolving   Clinical Presentation Rationale post op, clinical jugement   Clinical Decision Making (Complexity) low complexity   Planned Therapy Interventions (PT) balance training;bed mobility training;gait training;home exercise program;neuromuscular re-education;patient/family education;ROM (range of motion);stair training;strengthening;transfer training;risk factor education;progressive activity/exercise;home program guidelines   Risk & Benefits of therapy have been explained evaluation/treatment results reviewed;care plan/treatment goals reviewed;risks/benefits reviewed;current/potential barriers reviewed;participants voiced agreement with care plan;participants included;patient   PT Total Evaluation Time   PT Eval, Low Complexity Minutes (07182) 8   Physical Therapy Goals   PT Frequency Daily  (short LOS)   PT Predicted Duration/Target Date for Goal Attainment 12/16/23   PT Goals Bed Mobility;Transfers;Gait;Stairs   PT: Bed Mobility Independent;Within precautions   PT: Transfers Independent;Sit to/from stand;Bed to/from chair;Within precautions   PT: Gait Independent;Greater than 200 feet;Within precautions   PT: Stairs Supervision/stand-by assist;3 stairs;Rail on right;Within precautions   PT Discharge Planning    PT Plan progress gait no AD, flat bed mobility, stairs, dc/home concerns   PT Discharge Recommendation (DC Rec) home with assist;home with outpatient physical therapy   PT Rationale for DC Rec Pt below baseline, limited by post op precautions and pain, mobilizing well and on track for safe dc home when medically ready. Will need to be IND with functional/household mobility as spouse unable to provide assist. Will ahve support from Mom and Aunt. Rec OPPelvic floor as needed at discharge   PT Brief overview of current status Ax1 bed mobility, SBA for line management once up   Total Session Time   Total Session Time (sum of timed and untimed services) 8

## 2023-12-05 NOTE — PHARMACY-ADMISSION MEDICATION HISTORY
Pharmacist Admission Medication History    Admission medication history is complete. The information provided in this note is only as accurate as the sources available at the time of the update.    Information Source(s): Pre-op RN assessment, Dispense history (Surescripts)      Medication History Completed By: Yamini Ceja [facility] 12/4/2023 6:16 PM    Prior to Admission medications    Medication Sig Last Dose Taking? Auth Provider Long Term End Date   estradiol (ESTRACE) 2 MG tablet Take 1 tablet (2 mg) by mouth 2 times daily More than a month Yes Wilmer Logan MD Yes    lacosamide (VIMPAT) 200 MG TABS tablet Take 200 mg by mouth 2 times daily 12/3/2023 Yes Reported, Patient No

## 2023-12-05 NOTE — PROGRESS NOTES
"Urology  Progress Note    24 hour events/Subjective:     - No acute events overnight   - Pain well controlled on current regimen   - Tolerating regular diet ; no nausea or vomiting   - Passing flatus, no bowel movement   - Ambulated yesterday      Exam  /56   Pulse 74   Temp 97.7  F (36.5  C) (Oral)   Resp 12   Ht 1.727 m (5' 8\")   Wt 96.2 kg (212 lb 1.3 oz)   SpO2 95%   BMI 32.25 kg/m    No acute distress  Unlabored breathing on 2L NC  Abdomen soft, appropriately tender, nondistended. Incisions cdi, bolster intact  vogt draining clear yellow urine      Intake/Output Summary (Last 24 hours) at 12/5/2023 0637  Last data filed at 12/5/2023 0518  Gross per 24 hour   Intake 1860 ml   Output 3025 ml   Net -1165 ml       UOP 2000/925    Labs  Recent Labs   Lab Test 12/05/23  0557 12/04/23  1116 11/15/23  1642 03/02/21  1401 09/02/20  0624   WBC 18.0*  --  7.2  --  10.5   HGB 11.1* 12.1 13.3 13.7 12.4   CR  --  0.93 0.85 0.7 0.70        AM labs pending    7-Day Micro Results       No results found for the last 168 hours.            Assessment/Plan  28 year old y/o adult POD#1 s/p minimal depth vaginoplasty.     Note - plan changes for today are in bold below.    Neuro: pain control: PRN oxycodone 5-10 mg q3h , PRN dilaudid 0.2 - 0.4 mg q2h, and scheduled tylenol   CV: HDS   Pulm: incentive spirometry while awake  FEN/GI: reg diet, MIVF @ 100/hr  Endo: relatively euglycemic   : vogt in place  Heme/ID: monitor for s/s of infection; monitor Hb and transfuse as indicated   Activity: Up as tolerated  Ambulate at least TID   Ppx: SCDs, ambulation.   PT consult for penguin walk   Home RX on HOLD: estradiol  Dispo: anticipate discharge to home with reveal POD 2.       Seen and examined with the chief resident. Will discuss with Toribio Morales MD.    Jamaal Pelayo MD  Urology Resident      PTA medications:   Prior to Admission medications    Medication Sig Start Date End Date Taking? Authorizing Provider " "  estradiol (ESTRACE) 2 MG tablet Take 1 tablet (2 mg) by mouth 2 times daily 12/5/22  Yes Wilmer Logan MD   lacosamide (VIMPAT) 200 MG TABS tablet Take 200 mg by mouth 2 times daily   Yes Reported, Patient          Contacting the urology team: Please see [facility] and page on-call clinician with any questions or concerns regarding this patient. Note writer may be unavailable.     To access [employer] from intranet: under \"Applications\" --> \"Business Applications\" select \"[employer]\" and search \"Urology Adult & Pediatric/[facility].\" Please note that any question about a urology inpatient, [place] or [place], should go to job code [id number].     "

## 2023-12-05 NOTE — PROGRESS NOTES
Admitted/transferred from: PACU at 0100.  2 RN full   skin assessment completed by Ofe MATOS RN and Rochelle HUDSON RN.  Skin assessment finding: issues found Bilateral groin incisions. Vaginal bolster. Small scabs on BLE and 1 on abdomen. PIV. Cheney.   Interventions/actions: other N/A    Will continue to monitor.

## 2023-12-05 NOTE — PROGRESS NOTES
7746-3355 Aox4. VSS. On 2L NC. Pain managed with prn oxycodone and cristiane tylenol. Denies nausea. PIV infusing with LR at 100 ml/hr. Vaginal bolster with moderate dried drainage, intact. Cheney with AUOP. Passing gas. No BM. Up with SBA/A1. Ambulated in the room.

## 2023-12-06 ENCOUNTER — APPOINTMENT (OUTPATIENT)
Dept: PHYSICAL THERAPY | Facility: CLINIC | Age: 28
End: 2023-12-06
Attending: UROLOGY
Payer: COMMERCIAL

## 2023-12-06 VITALS
SYSTOLIC BLOOD PRESSURE: 117 MMHG | WEIGHT: 213.6 LBS | HEART RATE: 79 BPM | TEMPERATURE: 98.6 F | HEIGHT: 68 IN | BODY MASS INDEX: 32.37 KG/M2 | DIASTOLIC BLOOD PRESSURE: 57 MMHG | RESPIRATION RATE: 18 BRPM | OXYGEN SATURATION: 95 %

## 2023-12-06 LAB
ANION GAP SERPL CALCULATED.3IONS-SCNC: 7 MMOL/L (ref 7–15)
BUN SERPL-MCNC: 7.1 MG/DL (ref 6–20)
CALCIUM SERPL-MCNC: 8.6 MG/DL (ref 8.6–10)
CHLORIDE SERPL-SCNC: 105 MMOL/L (ref 98–107)
CREAT SERPL-MCNC: 0.7 MG/DL (ref 0.51–1.17)
DEPRECATED HCO3 PLAS-SCNC: 29 MMOL/L (ref 22–29)
EGFRCR SERPLBLD CKD-EPI 2021: >90 ML/MIN/1.73M2
ERYTHROCYTE [DISTWIDTH] IN BLOOD BY AUTOMATED COUNT: 12.9 % (ref 10–15)
GLUCOSE SERPL-MCNC: 97 MG/DL (ref 70–99)
HCT VFR BLD AUTO: 33.1 % (ref 35–53)
HGB BLD-MCNC: 10.5 G/DL (ref 11.7–17.7)
MCH RBC QN AUTO: 27.9 PG (ref 26.5–33)
MCHC RBC AUTO-ENTMCNC: 31.7 G/DL (ref 31.5–36.5)
MCV RBC AUTO: 88 FL (ref 78–100)
PATH REPORT.COMMENTS IMP SPEC: NORMAL
PATH REPORT.COMMENTS IMP SPEC: NORMAL
PATH REPORT.FINAL DX SPEC: NORMAL
PATH REPORT.GROSS SPEC: NORMAL
PATH REPORT.RELEVANT HX SPEC: NORMAL
PHOTO IMAGE: NORMAL
PLATELET # BLD AUTO: 214 10E3/UL (ref 150–450)
POTASSIUM SERPL-SCNC: 3.6 MMOL/L (ref 3.4–5.3)
RBC # BLD AUTO: 3.76 10E6/UL (ref 3.8–5.9)
SODIUM SERPL-SCNC: 141 MMOL/L (ref 135–145)
WBC # BLD AUTO: 9.7 10E3/UL (ref 4–11)

## 2023-12-06 PROCEDURE — 258N000003 HC RX IP 258 OP 636

## 2023-12-06 PROCEDURE — 85027 COMPLETE CBC AUTOMATED: CPT

## 2023-12-06 PROCEDURE — 36415 COLL VENOUS BLD VENIPUNCTURE: CPT

## 2023-12-06 PROCEDURE — 250N000011 HC RX IP 250 OP 636: Performed by: NURSE PRACTITIONER

## 2023-12-06 PROCEDURE — 80048 BASIC METABOLIC PNL TOTAL CA: CPT

## 2023-12-06 PROCEDURE — 97116 GAIT TRAINING THERAPY: CPT | Mod: GP

## 2023-12-06 PROCEDURE — 97530 THERAPEUTIC ACTIVITIES: CPT | Mod: GP

## 2023-12-06 PROCEDURE — 250N000011 HC RX IP 250 OP 636: Performed by: STUDENT IN AN ORGANIZED HEALTH CARE EDUCATION/TRAINING PROGRAM

## 2023-12-06 PROCEDURE — 250N000013 HC RX MED GY IP 250 OP 250 PS 637

## 2023-12-06 RX ORDER — OXYCODONE HYDROCHLORIDE 5 MG/1
5 TABLET ORAL EVERY 6 HOURS PRN
Qty: 10 TABLET | Refills: 0 | Status: SHIPPED | OUTPATIENT
Start: 2023-12-06 | End: 2023-12-20

## 2023-12-06 RX ORDER — AMOXICILLIN 250 MG
1-2 CAPSULE ORAL 2 TIMES DAILY
Qty: 45 TABLET | Refills: 0 | Status: SHIPPED | OUTPATIENT
Start: 2023-12-06 | End: 2024-05-24

## 2023-12-06 RX ORDER — HYDROMORPHONE HYDROCHLORIDE 1 MG/ML
0.3 INJECTION, SOLUTION INTRAMUSCULAR; INTRAVENOUS; SUBCUTANEOUS ONCE
Status: COMPLETED | OUTPATIENT
Start: 2023-12-06 | End: 2023-12-06

## 2023-12-06 RX ORDER — ACETAMINOPHEN 325 MG/1
650 TABLET ORAL EVERY 4 HOURS PRN
Qty: 100 TABLET | Refills: 0 | Status: SHIPPED | OUTPATIENT
Start: 2023-12-06

## 2023-12-06 RX ORDER — ESTRADIOL 2 MG/1
2 TABLET ORAL 2 TIMES DAILY
COMMUNITY
Start: 2023-12-06 | End: 2023-12-07

## 2023-12-06 RX ADMIN — ACETAMINOPHEN 975 MG: 325 TABLET, FILM COATED ORAL at 14:12

## 2023-12-06 RX ADMIN — SODIUM CHLORIDE: 9 INJECTION, SOLUTION INTRAVENOUS at 03:46

## 2023-12-06 RX ADMIN — HYDROMORPHONE HYDROCHLORIDE 0.3 MG: 1 INJECTION, SOLUTION INTRAMUSCULAR; INTRAVENOUS; SUBCUTANEOUS at 10:40

## 2023-12-06 RX ADMIN — POLYETHYLENE GLYCOL 3350 17 G: 17 POWDER, FOR SOLUTION ORAL at 08:23

## 2023-12-06 RX ADMIN — ACETAMINOPHEN 975 MG: 325 TABLET, FILM COATED ORAL at 00:01

## 2023-12-06 RX ADMIN — FAMOTIDINE 20 MG: 20 TABLET ORAL at 08:23

## 2023-12-06 RX ADMIN — OXYCODONE HYDROCHLORIDE 5 MG: 5 TABLET ORAL at 01:39

## 2023-12-06 RX ADMIN — ACETAMINOPHEN 975 MG: 325 TABLET, FILM COATED ORAL at 05:58

## 2023-12-06 RX ADMIN — SENNOSIDES AND DOCUSATE SODIUM 1 TABLET: 8.6; 5 TABLET ORAL at 08:27

## 2023-12-06 RX ADMIN — LACOSAMIDE 200 MG: 100 TABLET, FILM COATED ORAL at 08:23

## 2023-12-06 RX ADMIN — CEFAZOLIN 1 G: 1 INJECTION, POWDER, FOR SOLUTION INTRAMUSCULAR; INTRAVENOUS at 00:01

## 2023-12-06 RX ADMIN — CEFAZOLIN 1 G: 1 INJECTION, POWDER, FOR SOLUTION INTRAMUSCULAR; INTRAVENOUS at 08:23

## 2023-12-06 ASSESSMENT — ACTIVITIES OF DAILY LIVING (ADL)
ADLS_ACUITY_SCORE: 22

## 2023-12-06 NOTE — PROGRESS NOTES
5473-9958 Aox4. BP slightly soft, OVSS. On RA. Pain managed with prn oxycodone and cristiane tylenol. Denies nausea. Vaginal bolster with medium dried serosanguinous drainage, dry and intact. PIV Sl'd. Cheney with large amounts of urine output. Passing gas. No BM overnight. Up with A1/SBA.     Plan: Reveal today then discharge if stable.

## 2023-12-06 NOTE — PLAN OF CARE
Status: Pt is POD#1 s/p minimal depth vaginoplasty.   Vitals: VSS on RA  Neuros: AOx4, intact  IV: PIV infusing NS @ 100 mL/hr  Resp/trach: LS clear  Diet: Regular  Bowel status: No BM this shift, BS+  : Voiding adequately with vogt cath  Skin: Lindsey area incisions PATRICE  Pain: Denies  Activity: Up SBA, gb  Social: Mother at bedside  Plan: Possible discharge tomorrow, continue to monitor and follow POC.

## 2023-12-06 NOTE — PROGRESS NOTES
"Urology  Progress Note    24 hour events/Subjective:     - No acute events overnight   - Pain well controlled on current regimen   - Tolerating regular diet ; no nausea or vomiting   - Passing flatus, no bowel movement   - Ambulated yesterday      Exam  /57 (BP Location: Right arm)   Pulse 79   Temp 98  F (36.7  C) (Oral)   Resp 18   Ht 1.727 m (5' 8\")   Wt 98.4 kg (216 lb 14.4 oz)   SpO2 99%   BMI 32.98 kg/m    No acute distress  Unlabored breathing on room air   Abdomen soft, appropriately tender, nondistended. Incisions cdi, bolster intact, mild bruising on mons area  vogt draining clear yellow urine      Intake/Output Summary (Last 24 hours) at 12/5/2023 0637  Last data filed at 12/5/2023 0518  Gross per 24 hour   Intake 1860 ml   Output 3025 ml   Net -1165 ml       UOP 3045/2150    Labs  Recent Labs   Lab Test 12/05/23  0557 12/04/23  1116 11/15/23  1642 03/02/21  1401 09/02/20  0624   WBC 18.0*  --  7.2  --  10.5   HGB 11.1* 12.1 13.3   < > 12.4   CR 0.69 0.93 0.85   < > 0.70    < > = values in this interval not displayed.        AM labs pending    7-Day Micro Results       No results found for the last 168 hours.            Assessment/Plan  28 year old y/o adult POD#2 s/p minimal depth vaginoplasty.     Note - plan changes for today are in bold below.    Neuro: pain control: PRN oxycodone 5-10 mg q3h , PRN dilaudid 0.2 - 0.4 mg q2h, and scheduled tylenol   CV: HDS   Pulm: incentive spirometry while awake  FEN/GI: reg diet  Endo: relatively euglycemic   : vogt in place, TOV today  Heme/ID: monitor for s/s of infection; monitor Hb and transfuse as indicated   Activity: Up as tolerated  Ambulate at least TID   Ppx: SCDs, ambulation.   PT consult for penguin walk   Home RX on HOLD: estradiol  Dispo: reveal today and discharge home after      Seen and examined with the chief resident. Will discuss with Toribio Morales MD.    Jamaal Pelayo MD  Urology Resident      PTA medications:   Prior to " "Admission medications    Medication Sig Start Date End Date Taking? Authorizing Provider   estradiol (ESTRACE) 2 MG tablet Take 1 tablet (2 mg) by mouth 2 times daily 12/5/22  Yes Wilmer Logan MD   lacosamide (VIMPAT) 200 MG TABS tablet Take 200 mg by mouth 2 times daily   Yes Reported, Patient          Contacting the urology team: Please see [facility] and page on-call clinician with any questions or concerns regarding this patient. Note writer may be unavailable.     To access [employer] from intranet: under \"Applications\" --> \"Business Applications\" select \"[employer]\" and search \"Urology Adult & Pediatric/[facility].\" Please note that any question about a urology inpatient, [place] or [place], should go to job code [id number].     "

## 2023-12-06 NOTE — DISCHARGE SUMMARY
[facility] UroDischarge Summary    Patient: Kun Boyle    MRN: [id number]   : 1995         Date of Admission:  2023   Date of Discharge::  2023  Admitting Physician:  Toribio Morales MD  Discharge Physician:  Nikole Ledesma PA-C             Admission Diagnoses:   Gender dysphoria [F64.9]    Past Medical History:   Diagnosis Date    Anxiety     Asthma     Depression     Dermatitis 2021    Seizures (H)     Sleep apnea              Discharge Diagnosis:     Gender dysphoria [F64.9]    Past Medical History:   Diagnosis Date    Anxiety     Asthma     Depression     Dermatitis 2021    Seizures (H)     Sleep apnea           Procedures:     Procedure(s): 23 -   PROCEDURE:   Penile Inversion Minimal Depth Vaginoplasty which includes:  1. Clitoroplasty   2. Total Penectomy  3. Urethroplasty   4. Construction of Minimal Depth Artificial Vagina   5. Scrotectomy  6. Bilateral labiaplasty via adjacent tissue transfer of scrotal and mons skin flaps, size 12 x 5 cm on left and 12 x 5 cm on right.   Dr. Toribio Morales (Urology)            Medications Prior to Admission:     Medications Prior to Admission   Medication Sig Dispense Refill Last Dose    lacosamide (VIMPAT) 200 MG TABS tablet Take 200 mg by mouth 2 times daily   12/3/2023    [DISCONTINUED] estradiol (ESTRACE) 2 MG tablet Take 1 tablet (2 mg) by mouth 2 times daily 180 tablet 3 More than a month             Discharge Medications:     Current Discharge Medication List        START taking these medications    Details   acetaminophen (TYLENOL) 325 MG tablet Take 2 tablets (650 mg) by mouth every 4 hours as needed for pain  Qty: 100 tablet, Refills: 0    Associated Diagnoses: Postoperative pain      oxyCODONE (ROXICODONE) 5 MG tablet Take 1 tablet (5 mg) by mouth every 6 hours as needed for moderate to severe pain  Qty: 10 tablet, Refills: 0    Associated Diagnoses: Postoperative pain      senna-docusate  "(SENOKOT-S/PERICOLACE) 8.6-50 MG tablet Take 1-2 tablets by mouth 2 times daily To prevent or treat constipation  Qty: 45 tablet, Refills: 0    Associated Diagnoses: Postoperative pain           CONTINUE these medications which have CHANGED    Details   estradiol (ESTRACE) 2 MG tablet Take 1 tablet (2 mg) by mouth 2 times daily (OK to resume 7 days after surgery - 12/11/23)    Associated Diagnoses: Gender dysphoria in adult           CONTINUE these medications which have NOT CHANGED    Details   lacosamide (VIMPAT) 200 MG TABS tablet Take 200 mg by mouth 2 times daily                   Consultations:   Consultation during this admission received:   PHYSICAL THERAPY ADULT IP CONSULT          Brief History of Illness:   Reason for admission requiring a surgical or invasive procedure:   Gender dysphoria [F64.9]   The patient underwent the following procedure(s):   See above   There were no immediate complications during this procedure.    Please refer to the full operative summary for details.           Hospital Course:   The patient's hospital course was unremarkable.  Kun Boyle recovered as anticipated and experienced no post-operative complications.      On POD #2 her vaginal stent and Cheney were removed. She subsequently voided without difficulty. At this point she was ambulating without assitance, tolerating the discharge diet, had pain controlled with PO medications to go home with, and was requiring no IV medications or fluids. She was discharged to home with appropriate contact information, follow-up and instructions as seen below in the discharge paperwork.         Discharge Labs:     No results found for: \"PSA\"  Recent Labs   Lab 12/06/23  0541 12/05/23  0557   WBC 9.7 18.0*   HGB 10.5* 11.1*    230       Recent Labs   Lab 12/06/23  0541 12/05/23  0557    136   POTASSIUM 3.6 3.8   CHLORIDE 105 102   CO2 29 26   BUN 7.1 8.4   CR 0.70 0.69   GLC 97 129*   SHAWN 8.6 8.5*     No lab results " "found in last 7 days.    Invalid input(s): \"URINEBLOOD\"  No results found for this or any previous visit.         Discharge Instructions and Follow-Up:   Diet:  - Regular/ home diet    Activity:  - For 4 weeks, walk with small/ shuffle steps to prevent wide leg splits. Keep your knees close together.  No long strides.  Take stairs one at a time (for instance - right foot onto the step then left foot onto the same step).  This will avoid wide leg splits.    - Avoid strenuous exercise for 4-6 weeks.   - No lifting, pushing, pulling more than 10 pounds for 4-6 weeks. Take care when pushing with your arms to stand up.  - Do not strain your belly area.  When you bend, sit up or twice, you could strain the area around your incision.    - Avoid activities that put direct pressure on your vagina (ie. Bicycling, motorcycle, prolonged sitting, etc.)  - With sitting, use a cushion or donut.  If prolonged sitting is necessary, shift your weight onto your butt to avoid pressure on the surgical site.    - Do not strain with bowel movements.    - Do not drive until you can press the brake pedal quickly and fully without pain.   - Do not operate a motor vehicle while taking narcotic pain medications.     Medications:   1) PAIN: To reduce your Opiate use, multiple non-opiate medications can be taken together.  First take Tylenol (acetaminophen/APAP).  Then add ibuprofen (motrin).  Some of these have been prescribed for you.  Always follow prescribing guidelines.  Do not take more than 3,200 - 4,000mg of Tylenol (acetaminophen/ APAP) from all sources in any 24 hour period since this can cause liver damage.  Do not take more than 2000 - 2400mg of ibuprofen in any 24 hour period since this can cause kidney damage.     If you still have pain after using non-opiates, add Oxycodone, an Opiate medication that has been prescribed for pain.  Never drive, operate machinery or drink alcoholic beverages while you are taking Opiate pain " medications. Opiates will cause sleepiness and constipation and can become addictive, therefore it is best to stop or reduce them as soon as you can.  Any left over Opiates should be disposed of with an Authorized  for unneeded medications.  Contact your [facility] or [facility]'s household trash and recycling service to learn about medication disposal options and guidelines for your area.  If you decide to store this medication at home it should be kept in a locked cabinet to prevent access to children or visitors.     2) CONSTIPATION: Surgery, pain medications and bladder spasm medications can all make you constipated.  Pericolace (senna/docusate sodium) can be taken twice daily for prevention and treatment of constipation.  Other over the counter solutions such as prune juice, miralax, fiber products, senna, and dulcolax can also be used if you prefer.  Please reduce or stop these if you develop loose stools. If you are taking these but still have not had a bowel movement in 3 days, start over-the-counter Milk of Magnesia taken twice daily until you have a good result.  Call the office with any concerns.     3) HORMONES:    - OK to restart your hormone therapy ONE WEEK AFTER YOUR SURGICAL DATE (on 12/11/23)    4) BLOOD THINNERS:  - Ok to resume your home low dose aspirin (if you take home aspirin)     1) Incisions:   - Daily showering is important, and you may get incisions wet starting 48 hours after surgery.  - Do not scrub incisions or soak in a bath, pool, hot tub, etc. until incisions have fully healed, tubes have been removed, and authorized by your surgeon, typically 8 weeks.   - The wound dressing should be removed 48 hours after surgery.   - Your incisions were closed with dissolvable suture that does not need to be removed.  The purple skin glue on your incisions will flake off with time and should not be scrubbed.  Also avoid applying lotions or ointments to these areas.  - If steri-strips  "were used you may wash over them normally, as you would wash your remaining skin.  Steri-strips should fall off on their own within 5-10 days.   - It is preferable for the incisions to be left uncovered, but you may cover with gauze if needed for comfort or to protect clothing from drainage.     2) Vaginal Cares:  - Vaginal dilation per Dr. Morales's instructions  - Otherwise avoid vaginal activity (unless indicated by Dr. Morales)    3) Tubes/ Drains:  - Your surgical drains have been removed.  While the sites are healing, change dressings once per day or more frequently if soiled or wet, to keep the site clean.  Once the sites have healed, remove dressings completely to leave the sites uncovered.      Follow-Up:   - Schedule an appointment to be seen by your primary care provider within 7 days of discharge for a postoperative checkup.   - Follow up with Dr. Morales as scheduled on 12/19/23  - Call or return sooner than your regularly scheduled visit if you develop any of the following:  Fever (greater than 101.3F), uncontrolled pain, uncontrolled nausea or vomiting, concerns about bowel function, as well as increased redness, swelling, drainage from your wound or any concerns about urinating or urinary catheter drainage.      Phone numbers:   - Monday through Friday 8am to 4:30pm: Call [phone] with questions, requests for medication refills, or to schedule or confirm an appointment.  - Nights, weekends, or holidays: call the after hours emergency pager - [phone] and tell the  \"I would like to page the Urology Resident on call.\" Typically, the on-call provider should return your call within 30 minutes.  Please page the on-call provider again if you haven't been contacted as expected.  Rarely, the on-call provider will be unable to promptly return a call due to a hospital emergency.  If you have paged twice and are still not contacted, ask the hospital  to page the \"urology CHIEF-RESIDENT " "on call\".   - Please note that due to prescribing laws, resident physicians are unable to prescribe narcotics after-hours. If you feel as though you will need a refill of a narcotic pain medication, you will need to call the clinic during business hours OR seek emergency care.  - For emergencies, call 911         Discharge Disposition:     Discharged to home      Attestation: I have reviewed today's vital signs, notes, medications, labs and imaging.    Bety Ledesma PA-C  Urology Physician Assistant  Personal Pager: [phone]    Please call Job Code:   [phone] to reach the Urology resident or PA on call - Weekdays  [phone] to reach the Urology resident or PA on call - Weeknights and weekends    December 6, 2023         "

## 2023-12-06 NOTE — PLAN OF CARE
"Goal Outcome Evaluation:    Plan of Care Reviewed With: patient  Overall Patient Progress: improving    /57   Pulse 79   Temp 98.6  F (37  C) (Oral)   Resp 18   Ht 1.727 m (5' 8\")   Wt 96.9 kg (213 lb 9.6 oz)   SpO2 95%   BMI 32.48 kg/m      A+Ox4, neuros intact.  HR regular, LS clear-IS done.  +BS, +flatus, no BM yet.  Cheney with good uop. Remove at around 1100, pt able to void with no difficulty  Regular diet, good appetite  SBA with activity.  Pain in perineal area controlled with  sched tylenol.  Bolster removed, Bilateral groin incision c/d/I, Pad provided, no drainage noted.  Left piv is sl'd.  Ready for discharged.     "

## 2023-12-06 NOTE — PLAN OF CARE
Goal Outcome Evaluation:       Assumed care of patient at 1500. Bolster and vogt removed on previous shift, voiding spontaneously now. Discharge order placed. Discharge instructions reviewed with patient and her mom. Awaiting ride home from family.

## 2023-12-07 ENCOUNTER — TELEPHONE (OUTPATIENT)
Dept: PLASTIC SURGERY | Facility: CLINIC | Age: 28
End: 2023-12-07
Payer: COMMERCIAL

## 2023-12-07 DIAGNOSIS — F64.0 GENDER DYSPHORIA IN ADULT: Chronic | ICD-10-CM

## 2023-12-07 NOTE — TELEPHONE ENCOUNTER
Pharmacy sent script back stating 2 doses per day exceeds recommended maximum dose of 1 per day.   Beverly Harrison RN

## 2023-12-07 NOTE — PLAN OF CARE
Physical Therapy Discharge Summary    Reason for therapy discharge:    Discharged to home with outpatient pelvic floor PT.    Progress towards therapy goal(s). See goals on Care Plan in [facility] electronic health record for goal details.  Goals partially met.  Barriers to achieving goals:   discharge from facility.    Therapy recommendation(s):    Continued therapy is recommended.  Rationale/Recommendations:  Patient will benefit from further PT to continue to improve/maintain independence with functional mobility and ensure proper healing following surgery.

## 2023-12-07 NOTE — TELEPHONE ENCOUNTER
Called pt to follow up after her discharging from the hospital yesterday after minimal depth vaginoplasty on 12/4/23. Pt is with partner Pauly, who also had minimal depth vaginoplasty on 12/4/23. Pt's pain is manageable, alternating Tylenol and Oxycodone. Discussed incorporating ibuprofen and timing of when pt can take each medication. Pt reported minimal bleeding and drainage. Pt has no other questions at this time but will contact the team if questions arise prior to postop appointment on 12/19/23.

## 2023-12-09 ENCOUNTER — TELEPHONE (OUTPATIENT)
Dept: SURGERY | Facility: CLINIC | Age: 28
End: 2023-12-09
Payer: COMMERCIAL

## 2023-12-10 NOTE — TELEPHONE ENCOUNTER
Brief note:    Was called by Pat and Pauly regarding continued pain and requesting more narcotic pain medication. Discussed that the patient and Pauly should attempt to use tylenol and ibuprofen alternating scheduled to assist with the pain because the resident on call is unable to e-prescribe or prescribe outpatient narcotics. Discussed that if the pain is so severe that they feel they require more pain medication then they may need to go to the ED to be evaluated. Otherwise they can wait until Monday and reach out to the urology office. The patient acknowledged this.

## 2023-12-11 ENCOUNTER — MYC REFILL (OUTPATIENT)
Dept: FAMILY MEDICINE | Facility: CLINIC | Age: 28
End: 2023-12-11
Payer: COMMERCIAL

## 2023-12-11 DIAGNOSIS — G89.18 POSTOPERATIVE PAIN: ICD-10-CM

## 2023-12-11 DIAGNOSIS — G89.18 POSTOPERATIVE PAIN: Primary | ICD-10-CM

## 2023-12-11 RX ORDER — OXYCODONE HYDROCHLORIDE 5 MG/1
5 TABLET ORAL EVERY 6 HOURS PRN
Qty: 12 TABLET | Refills: 0 | Status: SHIPPED | OUTPATIENT
Start: 2023-12-11 | End: 2023-12-20

## 2023-12-15 RX ORDER — OXYCODONE HYDROCHLORIDE 5 MG/1
5 TABLET ORAL EVERY 6 HOURS PRN
Qty: 10 TABLET | Refills: 0 | OUTPATIENT
Start: 2023-12-15

## 2023-12-15 NOTE — TELEPHONE ENCOUNTER
Duplicate request for oxycodone. Oxycodone prescription sent to pharmacy same day as mychart request. Will decline duplacte request.   Beverly Harrison RN

## 2023-12-16 DIAGNOSIS — F64.0 GENDER DYSPHORIA IN ADULT: Chronic | ICD-10-CM

## 2023-12-16 RX ORDER — ESTRADIOL 2 MG/1
2 TABLET ORAL DAILY
Qty: 120 TABLET | Refills: 1 | Status: SHIPPED | OUTPATIENT
Start: 2023-12-16 | End: 2024-04-30

## 2023-12-16 RX ORDER — ESTRADIOL 2 MG/1
2 TABLET ORAL 2 TIMES DAILY
Qty: 180 TABLET | OUTPATIENT
Start: 2023-12-16

## 2023-12-19 ENCOUNTER — OFFICE VISIT (OUTPATIENT)
Dept: PLASTIC SURGERY | Facility: CLINIC | Age: 28
End: 2023-12-19
Payer: COMMERCIAL

## 2023-12-19 VITALS
BODY MASS INDEX: 32.92 KG/M2 | DIASTOLIC BLOOD PRESSURE: 78 MMHG | SYSTOLIC BLOOD PRESSURE: 115 MMHG | HEART RATE: 83 BPM | HEIGHT: 68 IN | OXYGEN SATURATION: 97 % | WEIGHT: 217.2 LBS | TEMPERATURE: 97.8 F

## 2023-12-19 DIAGNOSIS — F64.9 GENDER DYSPHORIA: Primary | ICD-10-CM

## 2023-12-19 PROCEDURE — 99024 POSTOP FOLLOW-UP VISIT: CPT | Performed by: UROLOGY

## 2023-12-19 ASSESSMENT — PAIN SCALES - GENERAL: PAINLEVEL: NO PAIN (1)

## 2023-12-19 NOTE — PROGRESS NOTES
"SUBJECTIVE:  Pat is a 28 year old here for her initial post-op visit after undergoing minimal depth vaginoplasty with Dr Morales on 12/04/23. She is doing well. Pain and swelling have improved a lot. She is still using tylenol and ibuprofen which is managing her pain well. She mainly feels occasional \"pinching\" sensations along incisions and some pressure sensation in generalized pubic area. She reports urine stream alternates between direct into toilet and being more messy. Showering regularly and happy with outcome so far. Minimal drainage as expected. She has been able to drive a short distance without pain.  No wounds or other concerns.    OBJECTIVE:  /78 (BP Location: Left arm, Patient Position: Sitting, Cuff Size: Adult Large)   Pulse 83   Temp 97.8  F (36.6  C)   Ht 1.727 m (5' 8\")   Wt 98.5 kg (217 lb 3.2 oz)   SpO2 97%   BMI 33.03 kg/m     General: NAD  Vulvar incisions c/d/I without wounds or dehiscence. Some residual swelling of vulva, but no significant bruising.  Clitoris healthy and well hooded.  Urethral plate healing well.    ASSESSMENT/PLAN:  S/P minimal depth vaginoplasty with great aesthetic results  Okay for clitoral stimulation at one month post-op  RTC in one month to see GAIL Christina APRN, CNP    Physician Attestation   I, Toribio Morales MD, saw this patient and agree with the findings and plan of care as documented in the note.      Toribio Morales MD  Reconstructive Urology    "

## 2023-12-19 NOTE — LETTER
"12/19/2023       RE: Kun Boyel  [address] [place] [id number]       Dear Colleague,    Thank you for referring your patient, Kun Boyle, to the [facility] [place] at [facility]. Please see a copy of my visit note below.    SUBJECTIVE:  Pat is a 28 year old here for her initial post-op visit after undergoing minimal depth vaginoplasty with Dr Morales on 12/04/23. She is doing well. Pain and swelling have improved a lot. She is still using tylenol and ibuprofen which is managing her pain well. She mainly feels occasional \"pinching\" sensations along incisions and some pressure sensation in generalized pubic area. She reports urine stream alternates between direct into toilet and being more messy. Showering regularly and happy with outcome so far. Minimal drainage as expected. She has been able to drive a short distance without pain.  No wounds or other concerns.    OBJECTIVE:  /78 (BP Location: Left arm, Patient Position: Sitting, Cuff Size: Adult Large)   Pulse 83   Temp 97.8  F (36.6  C)   Ht 1.727 m (5' 8\")   Wt 98.5 kg (217 lb 3.2 oz)   SpO2 97%   BMI 33.03 kg/m     General: NAD  Vulvar incisions c/d/I without wounds or dehiscence. Some residual swelling of vulva, but no significant bruising.  Clitoris healthy and well hooded.  Urethral plate healing well.    ASSESSMENT/PLAN:  S/P minimal depth vaginoplasty with great aesthetic results  Okay for clitoral stimulation at one month post-op  RTC in one month to see GAIL Christina APRN, CNP    Physician Attestation   I, Toribio Morales MD, saw this patient and agree with the findings and plan of care as documented in the note.        Again, thank you for allowing me to participate in the care of your patient.      Sincerely,    Toribio Morales MD      "

## 2023-12-19 NOTE — NURSING NOTE
"Chief Complaint   Patient presents with    Surgical Followup     2 week post-op, DOS 12/4/23.       Vitals:    12/19/23 1541   BP: 115/78   BP Location: Left arm   Patient Position: Sitting   Cuff Size: Adult Large   Pulse: 83   Temp: 97.8  F (36.6  C)   SpO2: 97%   Weight: 98.5 kg (217 lb 3.2 oz)   Height: 1.727 m (5' 8\")       Body mass index is 33.03 kg/m .    Patient reports mild genital pain (1/10).    Duane Rodgers, EMT    "

## 2024-01-04 ENCOUNTER — TRANSFERRED RECORDS (OUTPATIENT)
Dept: HEALTH INFORMATION MANAGEMENT | Facility: CLINIC | Age: 29
End: 2024-01-04
Payer: COMMERCIAL

## 2024-01-15 ENCOUNTER — OFFICE VISIT (OUTPATIENT)
Dept: PLASTIC SURGERY | Facility: CLINIC | Age: 29
End: 2024-01-15
Payer: COMMERCIAL

## 2024-01-15 VITALS
OXYGEN SATURATION: 96 % | BODY MASS INDEX: 32.89 KG/M2 | WEIGHT: 217 LBS | DIASTOLIC BLOOD PRESSURE: 76 MMHG | HEIGHT: 68 IN | SYSTOLIC BLOOD PRESSURE: 104 MMHG | HEART RATE: 91 BPM

## 2024-01-15 DIAGNOSIS — Z87.890 STATUS POST GENDER AFFIRMATION SURGERY: Primary | ICD-10-CM

## 2024-01-15 PROCEDURE — 99024 POSTOP FOLLOW-UP VISIT: CPT | Mod: KX | Performed by: NURSE PRACTITIONER

## 2024-01-15 ASSESSMENT — PAIN SCALES - GENERAL: PAINLEVEL: NO PAIN (0)

## 2024-01-15 NOTE — LETTER
"1/15/2024       RE: Kun Boyle  [address] [place] [id number]     Dear Colleague,    Thank you for referring your patient, Kun Boyle, to the [facility] CL[facility] [place] at [facility]. Please see a copy of my visit note below.    SUBJECTIVE:  Pat is a 29 year old here for ongoing follow-up after undergoing minimal depth vaginoplasty on 12/04/23.  She is doing well and rates pain \"0/10\" on pain scale today.  Voiding is getting better slowly, but she still has some spraying of urine.  She has tried clitoral stimulation and achieved orgasm. She reports this as a very pleasant experience.  Her only question is about two areas of her incision which stick to her underwear. She feels like the top portion of both incisions seem to stick to cotton underwear or pads each time she removes them. This leads to small pinkish-red stains on underwear and seems to be keeping incisions from fully healing as scabs keep coming off.  No localized drainage or redness suggested infection that she has noticed.  She also feels some remaining suture ends near her clitoris and just wants to ensure this is normal.  Otherwise she is very happy.      OBJECTIVE:  /76 (BP Location: Left arm, Patient Position: Chair, Cuff Size: Adult Large)   Pulse 91   Ht 1.727 m (5' 8\")   Wt 98.4 kg (217 lb)   SpO2 96%   BMI 32.99 kg/m     General: NAD  Symmetrical labia and vulva  Incisions c/d/I except for scattered areas along bilateral incisions where scabs recently removed leaving visible granulation/moist tissue (picture in chart)  Clitoris healthy and well-hooded  Urethral plate well healed      ASSESSMENT/PLAN:  S/P minimal depth vaginoplasty  Discussed applying Aquaphor or petroleum jelly to vulvar incisions to help keep moisturized during healing. Discussed how this may help minimize continued removal of scabs which is likely " prolonging healing  No signs of localized infection  RTC in one month with NP to reevaluate incisions  RTC between 4-6 months PO with Dr Morales     A total of 15 minutes was spent today with patient, reviewing records, completing charting, and other tasks as detailed above.           Again, thank you for allowing me to participate in the care of your patient.      Sincerely,    JOSE ALBERTO Christina CNP

## 2024-01-15 NOTE — PROGRESS NOTES
"SUBJECTIVE:  Pat is a 29 year old here for ongoing follow-up after undergoing minimal depth vaginoplasty on 12/04/23.  She is doing well and rates pain \"0/10\" on pain scale today.  Voiding is getting better slowly, but she still has some spraying of urine.  She has tried clitoral stimulation and achieved orgasm. She reports this as a very pleasant experience.  Her only question is about two areas of her incision which stick to her underwear. She feels like the top portion of both incisions seem to stick to cotton underwear or pads each time she removes them. This leads to small pinkish-red stains on underwear and seems to be keeping incisions from fully healing as scabs keep coming off.  No localized drainage or redness suggested infection that she has noticed.  She also feels some remaining suture ends near her clitoris and just wants to ensure this is normal.  Otherwise she is very happy.      OBJECTIVE:  /76 (BP Location: Left arm, Patient Position: Chair, Cuff Size: Adult Large)   Pulse 91   Ht 1.727 m (5' 8\")   Wt 98.4 kg (217 lb)   SpO2 96%   BMI 32.99 kg/m     General: NAD  Symmetrical labia and vulva  Incisions c/d/I except for scattered areas along bilateral incisions where scabs recently removed leaving visible granulation/moist tissue (picture in chart)  Clitoris healthy and well-hooded  Urethral plate well healed      ASSESSMENT/PLAN:  S/P minimal depth vaginoplasty  Discussed applying Aquaphor or petroleum jelly to vulvar incisions to help keep moisturized during healing. Discussed how this may help minimize continued removal of scabs which is likely prolonging healing  No signs of localized infection  RTC in one month with NP to reevaluate incisions  RTC between 4-6 months PO with Dr Andrew Angeles, APRN, CNP    A total of 15 minutes was spent today with patient, reviewing records, completing charting, and other tasks as detailed above.   "

## 2024-01-15 NOTE — NURSING NOTE
"Chief Complaint   Patient presents with    RECHECK     Pat, is being seen today for a 6 week post-op DOS 12/4/23.       Vitals:    01/15/24 1353   BP: 104/76   BP Location: Left arm   Patient Position: Chair   Cuff Size: Adult Large   Pulse: 91   SpO2: 96%   Weight: 98.4 kg (217 lb)   Height: 1.727 m (5' 8\")       Body mass index is 32.99 kg/m .      Vanessa Hart LPN    "

## 2024-01-16 ENCOUNTER — DOCUMENTATION ONLY (OUTPATIENT)
Dept: FAMILY MEDICINE | Facility: CLINIC | Age: 29
End: 2024-01-16

## 2024-01-16 NOTE — PROGRESS NOTES
"When opening a documentation only encounter, be sure to enter in \"Chief Complaint\" Forms and in \" Comments\" Title of form, description if needed.    Pat is a 29 year old  adult  Form received via: Fax  Form now resides in: Provider Ready    Jacqui Cardenas CMA                "

## 2024-04-30 ENCOUNTER — MYC REFILL (OUTPATIENT)
Dept: FAMILY MEDICINE | Facility: CLINIC | Age: 29
End: 2024-04-30
Payer: COMMERCIAL

## 2024-04-30 DIAGNOSIS — F64.0 GENDER DYSPHORIA IN ADULT: Chronic | ICD-10-CM

## 2024-05-02 RX ORDER — ESTRADIOL 2 MG/1
2 TABLET ORAL DAILY
Qty: 15 TABLET | Refills: 0 | Status: SHIPPED | OUTPATIENT
Start: 2024-05-02 | End: 2024-05-24

## 2024-05-02 NOTE — TELEPHONE ENCOUNTER
Per chart review:  Last visit to [facility] was 11/15/2023 for preop.   Last visit for gender care at hospitals was 3/12/2021  My Chart message previously sent on 12/7/2023 stating needs provider visit within one month. Message seen by patient    Appropriate for temporizing fill of two weeks, but patient needs to be seen. Virtual visit with outpatient blood work also acceptable.  Vidient message sent to patient today (5/2/2024 )    Cecilia Garza MD  5/2/2024  3:23 PM

## 2024-05-02 NOTE — TELEPHONE ENCOUNTER
May 2, 2024      Pat  [address] [place] [id number]          Dear Kun,      A request for a refill on your estradiol prescription was sent to us from your pharmacy. I have notified the pharmacy to allow you one more refill. [facility] patient's must have yearly gender focused visits with labs. Your last lab work for gender care were 3/12/2021.    It is time for your recheck at the clinic so we can monitor the medication and continue to prescribe it safely. Please make clinic appointment with me or another provider at [facility] in the next two weeks.     Thank you for choosing us as your healthcare provider.      Sincerely,    Cecilia Garza MD

## 2024-05-02 NOTE — TELEPHONE ENCOUNTER
"Request for medication refill:  estradiol (ESTRACE) 2 MG tablet     Providers if patient needs an appointment and you are willing to give a one month supply please refill for one month and  send a letter/MyChart using \".SMILLIMITEDREFILL\" .smillimited and route chart to \"P SMI \" (Giving one month refill in non controlled medications is strongly recommended before denial)    If refill has been denied, meaning absolutely no refills without visit, please complete the smart phrase \".smirxrefuse\" and route it to the \"P SMI MED REFILLS\"  pool to inform the patient and the pharmacy.    Jacqui Cardenas, CMA      "

## 2024-05-24 ENCOUNTER — OFFICE VISIT (OUTPATIENT)
Dept: FAMILY MEDICINE | Facility: CLINIC | Age: 29
End: 2024-05-24
Payer: COMMERCIAL

## 2024-05-24 VITALS
SYSTOLIC BLOOD PRESSURE: 123 MMHG | RESPIRATION RATE: 18 BRPM | OXYGEN SATURATION: 96 % | HEIGHT: 68 IN | DIASTOLIC BLOOD PRESSURE: 77 MMHG | TEMPERATURE: 98.1 F | WEIGHT: 233 LBS | BODY MASS INDEX: 35.31 KG/M2 | HEART RATE: 115 BPM

## 2024-05-24 DIAGNOSIS — G40.822 NONINTRACTABLE EPILEPTIC SPASMS WITHOUT STATUS EPILEPTICUS (H): ICD-10-CM

## 2024-05-24 DIAGNOSIS — F64.0 GENDER DYSPHORIA IN ADULT: Primary | ICD-10-CM

## 2024-05-24 DIAGNOSIS — Z11.59 NEED FOR HEPATITIS C SCREENING TEST: ICD-10-CM

## 2024-05-24 DIAGNOSIS — Z11.4 SCREENING FOR HIV (HUMAN IMMUNODEFICIENCY VIRUS): ICD-10-CM

## 2024-05-24 LAB — HGB BLD-MCNC: 12.8 G/DL (ref 11.7–17.7)

## 2024-05-24 PROCEDURE — 36415 COLL VENOUS BLD VENIPUNCTURE: CPT

## 2024-05-24 PROCEDURE — 84403 ASSAY OF TOTAL TESTOSTERONE: CPT

## 2024-05-24 PROCEDURE — 80053 COMPREHEN METABOLIC PANEL: CPT

## 2024-05-24 PROCEDURE — 80061 LIPID PANEL: CPT

## 2024-05-24 PROCEDURE — 87389 HIV-1 AG W/HIV-1&-2 AB AG IA: CPT

## 2024-05-24 PROCEDURE — 82670 ASSAY OF TOTAL ESTRADIOL: CPT

## 2024-05-24 PROCEDURE — 99214 OFFICE O/P EST MOD 30 MIN: CPT | Mod: GC

## 2024-05-24 PROCEDURE — 85018 HEMOGLOBIN: CPT

## 2024-05-24 PROCEDURE — 86803 HEPATITIS C AB TEST: CPT

## 2024-05-24 RX ORDER — ESTRADIOL 2 MG/1
2 TABLET ORAL DAILY
Qty: 30 TABLET | Refills: 3 | Status: SHIPPED | OUTPATIENT
Start: 2024-05-24

## 2024-05-24 NOTE — PROGRESS NOTES
"  Assessment & Plan     Screening for HIV (human immunodeficiency virus)  Routine [facility] screening ordered  - HIV Screening; Future  - HIV Screening    Need for hepatitis C screening test  Routine [facility] screening ordered  - Hepatitis C Screen Reflex to HCV RNA Quant and Genotype; Future  - Hepatitis C Screen Reflex to HCV RNA Quant and Genotype    Gender dysphoria in adult  Patient with gender dysphoria vasodilation status post Minimal Depth Vaginoplasty on December 4, 2023. Patient currently should be on estradiol 2 milligrams daily given status post orchiectomy. Patient is currently in out of medication for greater than one month, so anticipate that estradiol level below typical level. Treating today's labs as baseline lab work. Plan to obtain lab work at follow-up visit in four weeks.  - Testosterone Total; Future  - Comprehensive Metabolic Panel; Future  - Hemoglobin (HGB); Future  - Lipid Cascade; Future  - Glucose; Future  - estradiol (ESTRACE) 2 MG tablet; Take 1 tablet (2 mg) by mouth daily  - Estradiol; Future      Nonintractable epileptic spasms without status epilepticus (H)  patient currently on Vimpat without seizure in several years. Patient is currently managed by neurology. No clear reason to check labs today - particularly since patient is being managed by a specialist. Recommend patient to return neurology for assessment of Vimpat. Did offer patient to check drug levels if unable to see neurology and timely manner. However, did recommend best practices for managing provider to be the one to check lab work at levels for Vimpat.  - patient to call neurology        BMI  Estimated body mass index is 35.43 kg/m  as calculated from the following:    Height as of this encounter: 1.727 m (5' 8\").    Weight as of this encounter: 105.7 kg (233 lb).     See Patient Instructions    Return in about 4 weeks (around 6/21/2024) for Follow up gender care to ensure estradiol levels coming up.    Subjective   Pat is " "a 29 year old, presenting for the following health issues:  RECHECK (Blood levels )      5/24/2024     1:04 PM   Additional Questions   Roomed by david   Accompanied by self         5/24/2024     1:04 PM   Patient Reported Additional Medications   Patient reports taking the following new medications none         5/24/2024    Information    services provided? No     HPI     Things are going well postoperatively. Notices some lines but good healing  Embodiment goals: wonders about possible dosage change. For past 1.5 months was out of estradiol. Patient has been out of medications for past 1.5 months. Needed larger amount past. Notices some skin changes.  Patient states post surgically noticed less body hair (not thick)  Notes as well has some mood changes and some breakouts at certain parts of the month  Feels experiencing some \"effects\" of a period without a period. Feels perhaps like hormone cycling    Last saw neurology late February    Per chart review  Last visit to [facility] was 11/15/2023 for preop  Last visit for HRT visit at [facility] was 3/12/2021  My Chart message sent on 12/7/2023 stating needs provider visit within one month  Sent refill ~ 2 weeks ago patient was unable to obtain this due to pharmacy issues        Objective    /77 (BP Location: Left arm, Patient Position: Sitting, Cuff Size: Adult Regular)   Pulse 115   Temp 98.1  F (36.7  C)   Resp 18   Ht 1.727 m (5' 8\")   Wt 105.7 kg (233 lb)   SpO2 96%   BMI 35.43 kg/m    Body mass index is 35.43 kg/m .  Physical Exam   GENERAL: alert and no distress  EYES: Eyes grossly normal to inspection  RESP: lungs clear to auscultation - no rales, rhonchi or wheezes  CV: regular rate and rhythm, normal S1 S2, no S3 or S4, no murmur, click or rub, no peripheral edema  ABDOMEN: soft, nontender, no hepatosplenomegaly, no masses and bowel sounds normal  MS: no gross musculoskeletal defects noted, no edema  NEURO: Normal strength and " tone, mentation intact and speech normal            Signed Electronically by: Cecilia Garza MD

## 2024-05-24 NOTE — PATIENT INSTRUCTIONS
Patient Education   Here is the plan from today's visit    1. Screening for HIV (human immunodeficiency virus)    - HIV Screening; Future    2. Need for hepatitis C screening test    - Hepatitis C Screen Reflex to HCV RNA Quant and Genotype; Future    3. Gender dysphoria in adult    - Testosterone Total; Future  - Comprehensive Metabolic Panel; Future  - Hemoglobin (HGB); Future  - Lipid Cascade; Future  - Glucose; Future  - estradiol (ESTRACE) 2 MG tablet; Take 1 tablet (2 mg) by mouth daily  Dispense: 30 tablet; Refill: 3    4. Gender dysphoria in adult - trangender female    - Testosterone Total; Future  - Comprehensive Metabolic Panel; Future  - Hemoglobin (HGB); Future  - Lipid Cascade; Future  - Glucose; Future  - estradiol (ESTRACE) 2 MG tablet; Take 1 tablet (2 mg) by mouth daily  Dispense: 30 tablet; Refill: 3    5. Nonintractable epileptic spasms without status epilepticus (H)  - please continue vimpat  - please call to schedule follow up with neurology and blood levels  - if unable to get in with neurology we can draw vimpat levels at your follow up in 4 weeks        Please call or return to clinic if your symptoms don't go away.    Follow up plan  Return in about 4 weeks (around 6/21/2024) for Follow up gender care to ensure estradiol levels coming up.    Thank you for coming to [facility] today.  Lab Testing:  **If you had lab testing today and your results are reassuring or normal they will be mailed to you or sent through [employer] within 7 days.   **If the lab tests need quick action we will call you with the results.  **If you are having labs done on a different day, please call [phone] to schedule at [facility] or [phone] for other [facility] locations. Labs do not offer walk-in appointments.  The phone number we will call with results is # [phone] (home) . If this is not the best number please call our clinic and change the number.  Medication Refills:  If  you need any refills please call your pharmacy and they will contact us.   If you need to  your refill at a new pharmacy, please contact the new pharmacy directly. The new pharmacy will help you get your medications transferred faster.   Scheduling:  If you have any concerns about today's visit or wish to schedule another appointment please call our office during normal business hours [phone] (8-5:00 M-F). If you can no longer make a scheduled visit, please cancel via ididwork or call us to cancel.   If a referral was made to an [facility] specialty provider and you do not get a call from central scheduling, please refer to directions on your visit summary or call our office during normal business hours for assistance.   If a Mammogram was ordered for you at the Breast Center call [phone] to schedule or change your appointment.  If you had an XRay/CT/Ultrasound/MRI ordered the number is [phone] to schedule or change your radiology appointment.   [facility] has limited ultrasound appointments available on Wednesdays, if you would like your ultrasound at [facility], please call [phone] to schedule.   Medical Concerns:  If you have urgent medical concerns please call [phone] at any time of the day.    Cecilia Garza MD     [facility]  Lab and Imaging Center  [address]oor [address]  [address] [place] [address]  Hours:   Monday-Friday: 6:30AM - 5:00PM  Saturday: 7:00AM - 12:00PM  Appointment needed  Phone: [phone]    [facility]  Outpatient Lab  [facility], First Floor  [address] [place] [address]  Hours:   Monday - Friday 7:30a-5:30p  No appointment needed - Walk-Ins available  **For adult patients only    [facility]  First Floor Draw Station  [address] [address]  Hours:   Monday-Friday 7:30a-5:30p  Appointment needed  Phone: [phone]     [facility]  12th floor of [facility] in [address] [address]    Hours: Monday-Friday 7:30am-4:30pm  Walk- Ins Available  Phone: [phone]  **For pediatric patients only

## 2024-05-24 NOTE — PROGRESS NOTES
Preceptor Attestation:   Patient seen, evaluated and discussed with the resident. I have verified the content of the note, which accurately reflects my assessment of the patient and the plan of care.   Supervising Physician:  Tc Lambert MD

## 2024-05-25 LAB
ALBUMIN SERPL BCG-MCNC: 4.2 G/DL (ref 3.5–5.2)
ALP SERPL-CCNC: 48 U/L (ref 40–150)
ALT SERPL W P-5'-P-CCNC: 23 U/L (ref 0–70)
ANION GAP SERPL CALCULATED.3IONS-SCNC: 11 MMOL/L (ref 7–15)
AST SERPL W P-5'-P-CCNC: 21 U/L (ref 0–45)
BILIRUB SERPL-MCNC: 0.3 MG/DL
BUN SERPL-MCNC: 10.3 MG/DL (ref 6–20)
CALCIUM SERPL-MCNC: 9 MG/DL (ref 8.6–10)
CHLORIDE SERPL-SCNC: 102 MMOL/L (ref 98–107)
CHOLEST SERPL-MCNC: 165 MG/DL
CREAT SERPL-MCNC: 0.88 MG/DL (ref 0.51–1.17)
DEPRECATED HCO3 PLAS-SCNC: 26 MMOL/L (ref 22–29)
EGFRCR SERPLBLD CKD-EPI 2021: >90 ML/MIN/1.73M2
ESTRADIOL SERPL-MCNC: 451 PG/ML
FASTING STATUS PATIENT QL REPORTED: NO
GLUCOSE SERPL-MCNC: 107 MG/DL (ref 70–99)
GLUCOSE SERPL-MCNC: 107 MG/DL (ref 70–99)
HCV AB SERPL QL IA: NONREACTIVE
HDLC SERPL-MCNC: 45 MG/DL
HIV 1+2 AB+HIV1 P24 AG SERPL QL IA: NONREACTIVE
LDLC SERPL CALC-MCNC: 81 MG/DL
NONHDLC SERPL-MCNC: 120 MG/DL
POTASSIUM SERPL-SCNC: 3.7 MMOL/L (ref 3.4–5.3)
PROT SERPL-MCNC: 7 G/DL (ref 6.4–8.3)
SODIUM SERPL-SCNC: 139 MMOL/L (ref 135–145)
TRIGL SERPL-MCNC: 196 MG/DL

## 2024-05-30 LAB — TESTOST SERPL-MCNC: 16 NG/DL (ref 8–950)

## 2024-05-31 ENCOUNTER — TELEPHONE (OUTPATIENT)
Dept: FAMILY MEDICINE | Facility: CLINIC | Age: 29
End: 2024-05-31
Payer: COMMERCIAL

## 2024-05-31 DIAGNOSIS — F64.0 GENDER DYSPHORIA IN ADULT: Primary | ICD-10-CM

## 2024-05-31 NOTE — TELEPHONE ENCOUNTER
Pt read and replied to [employer] message from provider. Please see MyChart from 5/30/24    Alesha Robles RN            ----- Message from Cecilia Garza MD sent at 5/30/2024  6:20 PM CDT -----  Please call patient regarding estradiol result. Please see result note.

## 2024-06-04 ENCOUNTER — DOCUMENTATION ONLY (OUTPATIENT)
Dept: FAMILY MEDICINE | Facility: CLINIC | Age: 29
End: 2024-06-04
Payer: COMMERCIAL

## 2024-06-04 NOTE — PROGRESS NOTES
Kun Boyle has an upcoming lab appointment:    Future Appointments   Date Time Provider Department Center   6/5/2024  2:45 PM [facility]   6/18/2024  2:20 PM Toribio Morales MD [facility]     Patient is scheduled for the following lab(s): estradiol    There is no order available. Please review and place either future orders or HMPO (Review of Health Maintenance Protocol Orders), as appropriate.    Health Maintenance Due   Topic    ANNUAL REVIEW OF HM ORDERS      Ioana Lemons

## 2024-06-05 ENCOUNTER — LAB (OUTPATIENT)
Dept: LAB | Facility: CLINIC | Age: 29
End: 2024-06-05
Payer: COMMERCIAL

## 2024-06-05 DIAGNOSIS — F64.0 GENDER DYSPHORIA IN ADULT: ICD-10-CM

## 2024-06-05 PROCEDURE — 36415 COLL VENOUS BLD VENIPUNCTURE: CPT

## 2024-06-05 PROCEDURE — 82670 ASSAY OF TOTAL ESTRADIOL: CPT

## 2024-06-06 LAB — ESTRADIOL SERPL-MCNC: 75 PG/ML

## 2024-06-18 ENCOUNTER — OFFICE VISIT (OUTPATIENT)
Dept: PLASTIC SURGERY | Facility: CLINIC | Age: 29
End: 2024-06-18
Payer: COMMERCIAL

## 2024-06-18 VITALS
HEART RATE: 75 BPM | BODY MASS INDEX: 35.43 KG/M2 | SYSTOLIC BLOOD PRESSURE: 114 MMHG | HEIGHT: 68 IN | DIASTOLIC BLOOD PRESSURE: 73 MMHG | OXYGEN SATURATION: 97 %

## 2024-06-18 DIAGNOSIS — Z87.890 STATUS POST GENDER AFFIRMATION SURGERY: Primary | ICD-10-CM

## 2024-06-18 PROCEDURE — 99213 OFFICE O/P EST LOW 20 MIN: CPT | Mod: KX | Performed by: UROLOGY

## 2024-06-18 ASSESSMENT — PAIN SCALES - GENERAL: PAINLEVEL: NO PAIN (0)

## 2024-06-18 NOTE — PROGRESS NOTES
"SUBJECTIVE:  Pat is a 29 year old who underwent minimal depth vaginoplasty on 12/04/2023. She is doing well and very happy with her outcome. She is more than 6 months post-op and has no concerns. She reports good sexual function and successful orgasms. Urination is good without excessive spraying or other issues. She is happy with aesthetic appearance and no desire for revisions.    OBJECTIVE:  /73 (BP Location: Right arm, Patient Position: Sitting, Cuff Size: Adult Large)   Pulse 75   Ht 1.727 m (5' 8\")   SpO2 97%   BMI 35.43 kg/m     General: NAD  Gyn: well appearing minimal depth vaginoplasty with incisions c/d/I. Excellent aesthetic.    ASSESSMENT/PLAN:  S/P minimal depth vaginoplasty  Doing great.  Discussed ongoing care.  Followup PRN    Leny Angeles, APRN, CNP      "

## 2024-06-18 NOTE — NURSING NOTE
"Chief Complaint   Patient presents with    Surgical Followup     6 month post-op, DOS 12/4/24.       Vitals:    06/18/24 1431   BP: 114/73   BP Location: Right arm   Patient Position: Sitting   Cuff Size: Adult Large   Pulse: 75   SpO2: 97%   Height: 1.727 m (5' 8\")       Body mass index is 35.43 kg/m .      Duane Rodgers EMT    "

## 2024-06-18 NOTE — LETTER
"6/18/2024       RE: Kun Boyle  [address] [place] [id number]     Dear Colleague,    Thank you for referring your patient, Kun Boyle, to the [facility] [place] [facility]. Please see a copy of my visit note below.    SUBJECTIVE:  Pat is a 29 year old who underwent minimal depth vaginoplasty on 12/04/2023. She is doing well and very happy with her outcome. She is more than 6 months post-op and has no concerns. She reports good sexual function and successful orgasms. Urination is good without excessive spraying or other issues. She is happy with aesthetic appearance and no desire for revisions.    OBJECTIVE:  /73 (BP Location: Right arm, Patient Position: Sitting, Cuff Size: Adult Large)   Pulse 75   Ht 1.727 m (5' 8\")   SpO2 97%   BMI 35.43 kg/m     General: NAD  Gyn: well appearing minimal depth vaginoplasty with incisions c/d/I. Excellent aesthetic.    ASSESSMENT/PLAN:  S/P minimal depth vaginoplasty  Doing great.  Discussed ongoing care.  Followup PRN        Again, thank you for allowing me to participate in the care of your patient.      Sincerely,    Toribio Morales MD    "

## 2024-07-14 ENCOUNTER — HEALTH MAINTENANCE LETTER (OUTPATIENT)
Age: 29
End: 2024-07-14

## 2024-10-09 ENCOUNTER — MYC REFILL (OUTPATIENT)
Dept: FAMILY MEDICINE | Facility: CLINIC | Age: 29
End: 2024-10-09
Payer: COMMERCIAL

## 2024-10-09 DIAGNOSIS — F64.0 GENDER DYSPHORIA IN ADULT: ICD-10-CM

## 2024-10-09 NOTE — TELEPHONE ENCOUNTER
"Request for medication refill:    estradiol (ESTRACE) 2 MG tablet     Providers if patient needs an appointment and you are willing to give a one month supply please refill for one month and  send a letter/MyChart using \".SMILLIMITEDREFILL\" .smillimited and route chart to \"P [facility] \" (Giving one month refill in non controlled medications is strongly recommended before denial)    If refill has been denied, meaning absolutely no refills without visit, please complete the smart phrase \".smirxrefuse\" and route it to the \"P [facility]\"  pool to inform the patient and the pharmacy.    Renetta Alvarado MA      "

## 2024-10-11 RX ORDER — ESTRADIOL 2 MG/1
2 TABLET ORAL DAILY
Qty: 30 TABLET | Refills: 3 | Status: SHIPPED | OUTPATIENT
Start: 2024-10-11

## 2025-02-17 DIAGNOSIS — F64.0 GENDER DYSPHORIA IN ADULT: ICD-10-CM

## 2025-02-17 NOTE — TELEPHONE ENCOUNTER
"Request for medication refill:    estradiol (ESTRACE) 2 MG tablet     Providers if patient needs an appointment and you are willing to give a one month supply please refill for one month and  send a letter/MyChart using \".SMILLIMITEDREFILL\" .smillimited and route chart to \"P [facility] \" (Giving one month refill in non controlled medications is strongly recommended before denial)    If refill has been denied, meaning absolutely no refills without visit, please complete the smart phrase \".smirxrefuse\" and route it to the \"P [facility]\"  pool to inform the patient and the pharmacy.    Michelle Tabor MA     "

## 2025-02-18 RX ORDER — ESTRADIOL 2 MG/1
2 TABLET ORAL DAILY
Qty: 30 TABLET | Refills: 3 | Status: SHIPPED | OUTPATIENT
Start: 2025-02-18

## 2025-03-27 ENCOUNTER — LAB (OUTPATIENT)
Dept: LAB | Facility: CLINIC | Age: 30
End: 2025-03-27
Payer: COMMERCIAL

## 2025-03-27 DIAGNOSIS — Z79.899 ENCOUNTER FOR LONG-TERM (CURRENT) USE OF HIGH-RISK MEDICATION: ICD-10-CM

## 2025-03-27 LAB
ALBUMIN SERPL BCG-MCNC: 4.1 G/DL (ref 3.5–5.2)
ALP SERPL-CCNC: 56 U/L (ref 40–150)
ALT SERPL W P-5'-P-CCNC: 65 U/L (ref 0–70)
AST SERPL W P-5'-P-CCNC: 43 U/L (ref 0–45)
BILIRUB DIRECT SERPL-MCNC: 0.11 MG/DL (ref 0–0.3)
BILIRUB SERPL-MCNC: 0.3 MG/DL
PROT SERPL-MCNC: 7.3 G/DL (ref 6.4–8.3)

## 2025-07-19 ENCOUNTER — HEALTH MAINTENANCE LETTER (OUTPATIENT)
Age: 30
End: 2025-07-19

## 2025-08-22 DIAGNOSIS — F64.0 GENDER DYSPHORIA IN ADULT: ICD-10-CM

## 2025-08-23 SDOH — HEALTH STABILITY: PHYSICAL HEALTH: ON AVERAGE, HOW MANY MINUTES DO YOU ENGAGE IN EXERCISE AT THIS LEVEL?: 150+ MIN

## 2025-08-23 SDOH — HEALTH STABILITY: PHYSICAL HEALTH: ON AVERAGE, HOW MANY DAYS PER WEEK DO YOU ENGAGE IN MODERATE TO STRENUOUS EXERCISE (LIKE A BRISK WALK)?: 3 DAYS

## 2025-08-26 ENCOUNTER — OFFICE VISIT (OUTPATIENT)
Dept: FAMILY MEDICINE | Facility: CLINIC | Age: 30
End: 2025-08-26
Payer: COMMERCIAL

## 2025-08-26 VITALS
HEART RATE: 75 BPM | HEIGHT: 67 IN | SYSTOLIC BLOOD PRESSURE: 130 MMHG | RESPIRATION RATE: 20 BRPM | DIASTOLIC BLOOD PRESSURE: 78 MMHG | BODY MASS INDEX: 35.6 KG/M2 | OXYGEN SATURATION: 98 % | WEIGHT: 226.8 LBS | TEMPERATURE: 97.9 F

## 2025-08-26 DIAGNOSIS — F64.0 GENDER DYSPHORIA IN ADULT: ICD-10-CM

## 2025-08-26 DIAGNOSIS — Z00.00 ROUTINE GENERAL MEDICAL EXAMINATION AT A HEALTH CARE FACILITY: Primary | ICD-10-CM

## 2025-08-26 LAB
ALBUMIN SERPL BCG-MCNC: 4.2 G/DL (ref 3.5–5.2)
ALP SERPL-CCNC: 55 U/L (ref 40–150)
ALT SERPL W P-5'-P-CCNC: 22 U/L (ref 0–70)
ANION GAP SERPL CALCULATED.3IONS-SCNC: 10 MMOL/L (ref 7–15)
AST SERPL W P-5'-P-CCNC: 25 U/L (ref 0–45)
BASOPHILS # BLD AUTO: 0.05 10E3/UL (ref 0–0.2)
BASOPHILS NFR BLD AUTO: 0.5 %
BILIRUB SERPL-MCNC: <0.2 MG/DL
BUN SERPL-MCNC: 13.3 MG/DL (ref 6–20)
CALCIUM SERPL-MCNC: 9.2 MG/DL (ref 8.8–10.4)
CHLORIDE SERPL-SCNC: 102 MMOL/L (ref 98–107)
CHOLEST SERPL-MCNC: 159 MG/DL
CREAT SERPL-MCNC: 0.88 MG/DL (ref 0.51–1.17)
EGFRCR SERPLBLD CKD-EPI 2021: 90 ML/MIN/1.73M2
EOSINOPHIL # BLD AUTO: 0.56 10E3/UL (ref 0–0.7)
EOSINOPHIL NFR BLD AUTO: 6.1 %
ERYTHROCYTE [DISTWIDTH] IN BLOOD BY AUTOMATED COUNT: 12.8 % (ref 10–15)
EST. AVERAGE GLUCOSE BLD GHB EST-MCNC: 111 MG/DL
ESTRADIOL SERPL-MCNC: 42 PG/ML
FASTING STATUS PATIENT QL REPORTED: NO
FASTING STATUS PATIENT QL REPORTED: NO
GLUCOSE SERPL-MCNC: 91 MG/DL (ref 70–99)
HBA1C MFR BLD: 5.5 % (ref 0–5.6)
HCO3 SERPL-SCNC: 27 MMOL/L (ref 22–29)
HCT VFR BLD AUTO: 39.2 % (ref 35–53)
HDLC SERPL-MCNC: 45 MG/DL
HGB BLD-MCNC: 13 G/DL (ref 11.7–17.7)
IMM GRANULOCYTES # BLD: <0.04 10E3/UL
IMM GRANULOCYTES NFR BLD: 0.1 %
LDLC SERPL CALC-MCNC: 80 MG/DL
LYMPHOCYTES # BLD AUTO: 3.47 10E3/UL (ref 0.8–5.3)
LYMPHOCYTES NFR BLD AUTO: 38 %
MCH RBC QN AUTO: 28 PG (ref 26.5–33)
MCHC RBC AUTO-ENTMCNC: 33.2 G/DL (ref 31.5–36.5)
MCV RBC AUTO: 84.5 FL (ref 78–100)
MONOCYTES # BLD AUTO: 0.68 10E3/UL (ref 0–1.3)
MONOCYTES NFR BLD AUTO: 7.5 %
NEUTROPHILS # BLD AUTO: 4.35 10E3/UL (ref 1.6–8.3)
NEUTROPHILS NFR BLD AUTO: 47.8 %
NONHDLC SERPL-MCNC: 114 MG/DL
PLATELET # BLD AUTO: 284 10E3/UL (ref 150–450)
POTASSIUM SERPL-SCNC: 4 MMOL/L (ref 3.4–5.3)
PROT SERPL-MCNC: 7.2 G/DL (ref 6.4–8.3)
RBC # BLD AUTO: 4.64 10E6/UL (ref 3.8–5.9)
SODIUM SERPL-SCNC: 139 MMOL/L (ref 135–145)
TRIGL SERPL-MCNC: 169 MG/DL
WBC # BLD AUTO: 9.12 10E3/UL (ref 4–11)

## 2025-08-26 RX ORDER — ESTRADIOL 2 MG/1
2 TABLET ORAL DAILY
Qty: 30 TABLET | Refills: 11 | Status: SHIPPED | OUTPATIENT
Start: 2025-08-26

## 2025-08-26 RX ORDER — ESTRADIOL 2 MG/1
2 TABLET ORAL DAILY
Qty: 90 TABLET | OUTPATIENT
Start: 2025-08-26

## 2025-08-28 ENCOUNTER — RESULTS FOLLOW-UP (OUTPATIENT)
Dept: FAMILY MEDICINE | Facility: CLINIC | Age: 30
End: 2025-08-28
Payer: COMMERCIAL

## 2025-08-28 DIAGNOSIS — F64.0 GENDER DYSPHORIA IN ADULT: Primary | ICD-10-CM

## (undated) DEVICE — DRSG XEROFORM 5X9" CUR253590W

## (undated) DEVICE — DRAPE LAP W/ARMBOARD 29410

## (undated) DEVICE — SU PROLENE 0 CT-1 30" 8424H

## (undated) DEVICE — SU MONOCRYL 4-0 PS-2 27" UND Y426H

## (undated) DEVICE — PAD CHUX UNDERPAD 23X24" 7136

## (undated) DEVICE — DRSG KERLIX 4 1/2"X4YDS ROLL 6715

## (undated) DEVICE — SOL NACL 0.9% IRRIG 1000ML BOTTLE 2F7124

## (undated) DEVICE — GLOVE PROTEXIS W/NEU-THERA 7.5  2D73TE75

## (undated) DEVICE — SOL NACL 0.9% IRRIG 500ML BOTTLE 2F7123

## (undated) DEVICE — PREP POVIDONE-IODINE 7.5% SCRUB 4OZ BOTTLE MDS093945

## (undated) DEVICE — LINEN TOWEL PACK X30 5481

## (undated) DEVICE — SU MONOCRYL 3-0 SH 27" UND Y416H

## (undated) DEVICE — SU VICRYL 0 CT-2 27" J334H

## (undated) DEVICE — PACK MINOR CUSTOM ASC

## (undated) DEVICE — PREP POVIDONE-IODINE 10% SOLUTION 4OZ BOTTLE MDS093944

## (undated) DEVICE — DRAPE SHEET REV FOLD 3/4 9349

## (undated) DEVICE — PREP SKIN SCRUB TRAY 4461A

## (undated) DEVICE — SU SILK 0 SH 30" K834H

## (undated) DEVICE — DRAPE U SPLIT 74X120" 29440

## (undated) DEVICE — SUCTION TIP YANKAUER W/O VENT K86

## (undated) DEVICE — SOL WATER IRRIG 1000ML BOTTLE 2F7114

## (undated) DEVICE — Device

## (undated) DEVICE — GLOVE BIOGEL PI MICRO SZ 7.5 48575

## (undated) DEVICE — DRSG GAUZE 4X4" TRAY 6939

## (undated) DEVICE — ESU CORD BIPOLAR GREEN 10-4000

## (undated) DEVICE — ESU GROUND PAD ADULT W/CORD E7507

## (undated) DEVICE — PREP POVIDONE IODINE SOLUTION 10% 120ML

## (undated) DEVICE — LINEN TOWEL PACK X6 WHITE 5487

## (undated) DEVICE — PREP POVIDONE IODINE SCRUB 7.5% 120ML

## (undated) DEVICE — ESU LIGASURE IMPACT OPEN SEALER/DVDR CVD LG JAW LF4418

## (undated) DEVICE — GLOVE BIOGEL PI ULTRATOUCH SZ 7.5 41175

## (undated) DEVICE — BLADE CLIPPER SGL USE 9680

## (undated) DEVICE — SPONGE LAP 18X18" X8435

## (undated) DEVICE — SU VICRYL 3-0 SH 27" UND J416H

## (undated) DEVICE — SU ETHILON 3-0 PS-1 18" 1663H

## (undated) DEVICE — CATH FOLYSIL 16FR 15ML AA6116

## (undated) DEVICE — SU VICRYL 3-0 SH 27" J316H

## (undated) DEVICE — SURGICEL POWDER ABSORBABLE HEMOSTAT 3GM 3013SP

## (undated) DEVICE — SUCTION MANIFOLD NEPTUNE 2 SYS 4 PORT 0702-020-000

## (undated) DEVICE — SU PDS II 3-0 SH 27" Z316H

## (undated) DEVICE — LINEN TOWEL PACK X5 5464

## (undated) DEVICE — PANTIES MESH LG/XLG 2PK 706M2

## (undated) DEVICE — CATH PLUG W/CAP 000076

## (undated) RX ORDER — GABAPENTIN 300 MG/1
CAPSULE ORAL
Status: DISPENSED
Start: 2019-11-22

## (undated) RX ORDER — LACOSAMIDE 200 MG/1
TABLET ORAL
Status: DISPENSED
Start: 2023-12-04

## (undated) RX ORDER — FENTANYL CITRATE 50 UG/ML
INJECTION, SOLUTION INTRAMUSCULAR; INTRAVENOUS
Status: DISPENSED
Start: 2019-11-22

## (undated) RX ORDER — PROPOFOL 10 MG/ML
INJECTION, EMULSION INTRAVENOUS
Status: DISPENSED
Start: 2019-11-22

## (undated) RX ORDER — FENTANYL CITRATE 50 UG/ML
INJECTION, SOLUTION INTRAMUSCULAR; INTRAVENOUS
Status: DISPENSED
Start: 2023-12-04

## (undated) RX ORDER — SODIUM CHLORIDE 9 MG/ML
INJECTION, SOLUTION INTRAVENOUS
Status: DISPENSED
Start: 2023-12-04

## (undated) RX ORDER — BUPIVACAINE HYDROCHLORIDE AND EPINEPHRINE 5; 5 MG/ML; UG/ML
INJECTION, SOLUTION EPIDURAL; INTRACAUDAL; PERINEURAL
Status: DISPENSED
Start: 2023-12-04

## (undated) RX ORDER — CEFAZOLIN SODIUM/WATER 2 G/20 ML
SYRINGE (ML) INTRAVENOUS
Status: DISPENSED
Start: 2023-12-04

## (undated) RX ORDER — ONDANSETRON 2 MG/ML
INJECTION INTRAMUSCULAR; INTRAVENOUS
Status: DISPENSED
Start: 2019-11-22

## (undated) RX ORDER — ONDANSETRON 2 MG/ML
INJECTION INTRAMUSCULAR; INTRAVENOUS
Status: DISPENSED
Start: 2023-12-04

## (undated) RX ORDER — ACETAMINOPHEN 325 MG/1
TABLET ORAL
Status: DISPENSED
Start: 2023-12-04

## (undated) RX ORDER — KETOROLAC TROMETHAMINE 30 MG/ML
INJECTION, SOLUTION INTRAMUSCULAR; INTRAVENOUS
Status: DISPENSED
Start: 2019-11-22

## (undated) RX ORDER — OXYCODONE HYDROCHLORIDE 5 MG/1
TABLET ORAL
Status: DISPENSED
Start: 2019-11-22

## (undated) RX ORDER — OXYCODONE HYDROCHLORIDE 5 MG/1
TABLET ORAL
Status: DISPENSED
Start: 2023-12-04

## (undated) RX ORDER — HYDROMORPHONE HYDROCHLORIDE 1 MG/ML
INJECTION, SOLUTION INTRAMUSCULAR; INTRAVENOUS; SUBCUTANEOUS
Status: DISPENSED
Start: 2023-12-04

## (undated) RX ORDER — DEXAMETHASONE SODIUM PHOSPHATE 4 MG/ML
INJECTION, SOLUTION INTRA-ARTICULAR; INTRALESIONAL; INTRAMUSCULAR; INTRAVENOUS; SOFT TISSUE
Status: DISPENSED
Start: 2023-12-04

## (undated) RX ORDER — DEXAMETHASONE SODIUM PHOSPHATE 4 MG/ML
INJECTION, SOLUTION INTRA-ARTICULAR; INTRALESIONAL; INTRAMUSCULAR; INTRAVENOUS; SOFT TISSUE
Status: DISPENSED
Start: 2019-11-22

## (undated) RX ORDER — HEPARIN SODIUM 5000 [USP'U]/.5ML
INJECTION, SOLUTION INTRAVENOUS; SUBCUTANEOUS
Status: DISPENSED
Start: 2023-12-04

## (undated) RX ORDER — GLYCOPYRROLATE 0.2 MG/ML
INJECTION INTRAMUSCULAR; INTRAVENOUS
Status: DISPENSED
Start: 2019-11-22

## (undated) RX ORDER — LIDOCAINE HYDROCHLORIDE 20 MG/ML
INJECTION, SOLUTION EPIDURAL; INFILTRATION; INTRACAUDAL; PERINEURAL
Status: DISPENSED
Start: 2019-11-22

## (undated) RX ORDER — CEFAZOLIN SODIUM 1 G/3ML
INJECTION, POWDER, FOR SOLUTION INTRAMUSCULAR; INTRAVENOUS
Status: DISPENSED
Start: 2019-11-22

## (undated) RX ORDER — SODIUM CHLORIDE, SODIUM LACTATE, POTASSIUM CHLORIDE, CALCIUM CHLORIDE 600; 310; 30; 20 MG/100ML; MG/100ML; MG/100ML; MG/100ML
INJECTION, SOLUTION INTRAVENOUS
Status: DISPENSED
Start: 2023-12-04

## (undated) RX ORDER — HYDROMORPHONE HCL IN WATER/PF 6 MG/30 ML
PATIENT CONTROLLED ANALGESIA SYRINGE INTRAVENOUS
Status: DISPENSED
Start: 2023-12-04

## (undated) RX ORDER — ACETAMINOPHEN 325 MG/1
TABLET ORAL
Status: DISPENSED
Start: 2019-11-22

## (undated) RX ORDER — PROPOFOL 10 MG/ML
INJECTION, EMULSION INTRAVENOUS
Status: DISPENSED
Start: 2023-12-04

## (undated) RX ORDER — AMOXICILLIN 250 MG
CAPSULE ORAL
Status: DISPENSED
Start: 2023-12-05